# Patient Record
Sex: FEMALE | Race: OTHER | HISPANIC OR LATINO
[De-identification: names, ages, dates, MRNs, and addresses within clinical notes are randomized per-mention and may not be internally consistent; named-entity substitution may affect disease eponyms.]

---

## 2018-04-02 ENCOUNTER — APPOINTMENT (OUTPATIENT)
Dept: INTERNAL MEDICINE | Facility: CLINIC | Age: 38
End: 2018-04-02

## 2019-02-01 ENCOUNTER — OUTPATIENT (OUTPATIENT)
Dept: OUTPATIENT SERVICES | Facility: HOSPITAL | Age: 39
LOS: 1 days | End: 2019-02-01
Payer: MEDICAID

## 2019-02-01 PROCEDURE — G9001: CPT

## 2019-02-11 ENCOUNTER — INPATIENT (INPATIENT)
Facility: HOSPITAL | Age: 39
LOS: 9 days | Discharge: HOME HEALTH | End: 2019-02-21
Attending: SPECIALIST | Admitting: SPECIALIST

## 2019-02-11 VITALS
DIASTOLIC BLOOD PRESSURE: 58 MMHG | OXYGEN SATURATION: 96 % | TEMPERATURE: 100 F | RESPIRATION RATE: 20 BRPM | SYSTOLIC BLOOD PRESSURE: 120 MMHG | HEART RATE: 115 BPM

## 2019-02-11 LAB — HIV 1 & 2 AB SERPL IA.RAPID: REACTIVE

## 2019-02-11 RX ORDER — IPRATROPIUM/ALBUTEROL SULFATE 18-103MCG
3 AEROSOL WITH ADAPTER (GRAM) INHALATION ONCE
Refills: 0 | Status: COMPLETED | OUTPATIENT
Start: 2019-02-11 | End: 2019-02-11

## 2019-02-11 RX ORDER — CEFTRIAXONE 500 MG/1
1 INJECTION, POWDER, FOR SOLUTION INTRAMUSCULAR; INTRAVENOUS EVERY 24 HOURS
Qty: 0 | Refills: 0 | Status: DISCONTINUED | OUTPATIENT
Start: 2019-02-11 | End: 2019-02-12

## 2019-02-11 RX ORDER — IBUPROFEN 200 MG
600 TABLET ORAL ONCE
Refills: 0 | Status: COMPLETED | OUTPATIENT
Start: 2019-02-11 | End: 2019-02-11

## 2019-02-11 RX ORDER — FLUCONAZOLE 150 MG/1
200 TABLET ORAL ONCE
Qty: 0 | Refills: 0 | Status: COMPLETED | OUTPATIENT
Start: 2019-02-11 | End: 2019-02-11

## 2019-02-11 RX ORDER — SODIUM CHLORIDE 9 MG/ML
1000 INJECTION INTRAMUSCULAR; INTRAVENOUS; SUBCUTANEOUS
Qty: 0 | Refills: 0 | Status: DISCONTINUED | OUTPATIENT
Start: 2019-02-11 | End: 2019-02-13

## 2019-02-11 RX ORDER — SODIUM CHLORIDE 9 MG/ML
3 INJECTION INTRAMUSCULAR; INTRAVENOUS; SUBCUTANEOUS EVERY 8 HOURS
Refills: 0 | Status: DISCONTINUED | OUTPATIENT
Start: 2019-02-11 | End: 2019-02-20

## 2019-02-11 RX ORDER — CEFTRIAXONE 500 MG/1
1 INJECTION, POWDER, FOR SOLUTION INTRAMUSCULAR; INTRAVENOUS ONCE
Refills: 0 | Status: COMPLETED | OUTPATIENT
Start: 2019-02-11 | End: 2019-02-11

## 2019-02-11 RX ORDER — SODIUM CHLORIDE 9 MG/ML
1000 INJECTION INTRAMUSCULAR; INTRAVENOUS; SUBCUTANEOUS ONCE
Refills: 0 | Status: COMPLETED | OUTPATIENT
Start: 2019-02-11 | End: 2019-02-11

## 2019-02-11 RX ORDER — AZITHROMYCIN 500 MG/1
500 TABLET, FILM COATED ORAL EVERY 24 HOURS
Qty: 0 | Refills: 0 | Status: DISCONTINUED | OUTPATIENT
Start: 2019-02-11 | End: 2019-02-12

## 2019-02-11 RX ORDER — AZITHROMYCIN 500 MG/1
500 TABLET, FILM COATED ORAL ONCE
Refills: 0 | Status: COMPLETED | OUTPATIENT
Start: 2019-02-11 | End: 2019-02-11

## 2019-02-11 RX ORDER — FLUCONAZOLE 150 MG/1
100 TABLET ORAL DAILY
Qty: 0 | Refills: 0 | Status: DISCONTINUED | OUTPATIENT
Start: 2019-02-11 | End: 2019-02-13

## 2019-02-11 RX ORDER — ACETAMINOPHEN 500 MG
650 TABLET ORAL EVERY 6 HOURS
Qty: 0 | Refills: 0 | Status: DISCONTINUED | OUTPATIENT
Start: 2019-02-11 | End: 2019-02-21

## 2019-02-11 RX ORDER — NYSTATIN 500MM UNIT
500000 POWDER (EA) MISCELLANEOUS
Qty: 0 | Refills: 0 | Status: DISCONTINUED | OUTPATIENT
Start: 2019-02-11 | End: 2019-02-13

## 2019-02-11 RX ADMIN — AZITHROMYCIN 255 MILLIGRAM(S): 500 TABLET, FILM COATED ORAL at 18:42

## 2019-02-11 RX ADMIN — CEFTRIAXONE 100 GRAM(S): 500 INJECTION, POWDER, FOR SOLUTION INTRAMUSCULAR; INTRAVENOUS at 18:42

## 2019-02-11 RX ADMIN — Medication 600 MILLIGRAM(S): at 16:55

## 2019-02-11 RX ADMIN — SODIUM CHLORIDE 1000 MILLILITER(S): 9 INJECTION INTRAMUSCULAR; INTRAVENOUS; SUBCUTANEOUS at 16:55

## 2019-02-11 RX ADMIN — Medication 1 TABLET(S): at 23:11

## 2019-02-11 RX ADMIN — SODIUM CHLORIDE 50 MILLILITER(S): 9 INJECTION INTRAMUSCULAR; INTRAVENOUS; SUBCUTANEOUS at 23:11

## 2019-02-11 RX ADMIN — Medication 500000 UNIT(S): at 23:12

## 2019-02-11 RX ADMIN — FLUCONAZOLE 200 MILLIGRAM(S): 150 TABLET ORAL at 19:04

## 2019-02-11 RX ADMIN — SODIUM CHLORIDE 1000 MILLILITER(S): 9 INJECTION INTRAMUSCULAR; INTRAVENOUS; SUBCUTANEOUS at 18:42

## 2019-02-11 RX ADMIN — SODIUM CHLORIDE 3 MILLILITER(S): 9 INJECTION INTRAMUSCULAR; INTRAVENOUS; SUBCUTANEOUS at 18:43

## 2019-02-11 RX ADMIN — Medication 3 MILLILITER(S): at 16:55

## 2019-02-11 NOTE — ED PROVIDER NOTE - OBJECTIVE STATEMENT
37 y/o female Wolof speaking presents c/o "I have 39 y/o female Hong Konger speaking presents c/o "I have a bad cough with white and green phlegm for 2 weeks. Sometimes when I cough I throw up. My throat hurts and it feels like it's burning. I had a fever today. I took Tylenol 2 tabs at 1 o'clock. I feel weak. I was seen at [facility] and they prescribed me Tessalon Perles, Claritan and  Nasonex with no improvement of my symptoms." no CP/ HA 39 y/o female Haitian speaking presents c/o "I have a bad cough with white and green phlegm for 2 weeks. Sometimes when I cough I throw up. My throat hurts and it feels like it's burning. I had a fever today. I took Tylenol 2 tabs at 1 o'clock. I feel weak and SOB. I was seen at [facility] and they prescribed me Tessalon Perles, Claritan and  Nasonex with no improvement of my symptoms." no CP/ HA

## 2019-02-11 NOTE — H&P ADULT - NSHPLABSRESULTS_GEN_ALL_CORE
13.7   7.88  )-----------( 350      ( 11 Feb 2019 16:51 )             40.2     137  |  99  |  10  ----------------------------<  109<H>  3.7   |  20  |  0.7    Ca    8.9      11 Feb 2019 16:51    Lactate Trend  02-11 @ 16:51 Lactate:1.0     < from: Xray Chest 2 Views PA/Lat (02.11.19 @ 17:27) >    Scattered bilateral airspace opacities, right greater than left,   suspicious for pneumonia in the appropriate clinical setting.     < end of copied text >

## 2019-02-11 NOTE — H&P ADULT - HISTORY OF PRESENT ILLNESS
38 yof with no significant pmh p/w cc of sore throat, 40 lb weight loss, decrease appetite since 1 month assoc with fever and productive cough since 1 week ptp;  - pt has been having dysphagia, sore throat since 1 month which is progressively worsening  - pt reports loosing 40 lb weight in 1 month period associated with decrease appetite;  - Fever assoc with productive cough with greenish white sputum started 1 week ago and has been progressively worsening; pt feels very weak since 1 week  - Pt currently has unprotected sex with one partner but reports to have many sexual partners in the past; Pt has been in [place] since [place]

## 2019-02-11 NOTE — H&P ADULT - NSHPPHYSICALEXAM_GEN_ALL_CORE
GENERAL: NAD, speaks in full sentences, no signs of respiratory distress  HEAD:  Atraumatic, Normocephalic  EYES: EOMI, PERRLA, conjunctiva and sclera clear  NECK: Supple, No JVD  CHEST/LUNG: Clear to auscultation bilaterally; No wheeze; No crackles; No accessory muscles used  HEART: Regular rate and rhythm; No murmurs;   ABDOMEN: Soft, Nontender, Nondistended; Bowel sounds present; No guarding  EXTREMITIES:  2+ Peripheral Pulses, No cyanosis or edema  PSYCH: AAOx3  NEUROLOGY: non-focal  SKIN: No rashes or lesions    T(C): 37.8 (11 Feb 2019 18:22), Max: 37.8 (11 Feb 2019 18:22)  T(F): 100 (11 Feb 2019 18:22), Max: 100 (11 Feb 2019 18:22)  HR: 99 (11 Feb 2019 18:22) (99 - 115)  BP: 102/69 (11 Feb 2019 18:22) (102/69 - 120/58)  RR: 20 (11 Feb 2019 18:22) (20 - 20)  SpO2: 100% (11 Feb 2019 18:22) (96% - 100%) GENERAL: ill appearing  HEAD:  Atraumatic, Normocephalic  ENT- oral thrush  NECK: Supple, No JVD  CHEST/LUNG: Clear to auscultation bilaterally;   HEART: Regular rate and rhythm; No murmurs;   ABDOMEN: Soft, Nontender, Nondistended; Bowel sounds present; No guarding  EXTREMITIES: No cyanosis or edema  PSYCH: AAOx3  NEUROLOGY: non-focal  SKIN: No rashes or lesions    T(C): 37.8 (11 Feb 2019 18:22), Max: 37.8 (11 Feb 2019 18:22)  T(F): 100 (11 Feb 2019 18:22), Max: 100 (11 Feb 2019 18:22)  HR: 99 (11 Feb 2019 18:22) (99 - 115)  BP: 102/69 (11 Feb 2019 18:22) (102/69 - 120/58)  RR: 20 (11 Feb 2019 18:22) (20 - 20)  SpO2: 100% (11 Feb 2019 18:22) (96% - 100%)

## 2019-02-11 NOTE — ED PROVIDER NOTE - ATTENDING CONTRIBUTION TO CARE
37 yo F ( phone utilized), now with cough x 3-4 wks and throat burning.  seen by urg.   vss, nontoxic, il appearing, obviously in pain when swallowing, pink conj, anicteric, MMM, + thrush noted to oropharynx, neck supple, dec BS bilat, RRR, equal radial pulses bilat, abd soft/nt/nd, no cva tend. no calves tend, no edema, no fnd. no rashes.  cxr, labs, reassess

## 2019-02-11 NOTE — H&P ADULT - ASSESSMENT
# Community acquired Pneumonia  - rocephin + azithro  - f/u cultures    # Oral thrush  - s/p fluconazole  - f/u HIV panel    # Dvt ppx  - sc lovenox    # Dispo  - home # Community acquired Pneumonia  - rocephin + azithro  - f/u cultures  - sputum cultures    # Dysphagia 2/2 severe Oral thrush/ weight loss r/o Cellular immunodeficiency 2/2 HIV vs malignancy  - s/p fluconazole one dose; start nystatin for now  - consider ID cs  - pap smear reported to be normal by pt;   - f/u HIV panel    # Dvt ppx  - sc lovenox    # Dispo  - home 38 yof with no significant pmh p/w cc of sore throat, 40 lb weight loss, decrease appetite since 1 month assoc with fever and productive cough since 1 week ptp;    # Community acquired Pneumonia  - rocephin + azithro  - f/u cultures  - sputum cultures    # Dysphagia 2/2 severe Oral thrush/ weight loss/ Decrease appetite/ r/o Cellular immunodeficiency 2/2 HIV vs malignancy  - s/p fluconazole one dose; start nystatin for now  - consider ID cs  - pap smear reported to be normal by pt;   - f/u HIV panel    # Dvt ppx  - sc lovenox    # Dispo  - home 38 yof with no significant pmh p/w cc of sore throat, 40 lb weight loss, decrease appetite since 1 month assoc with fever and productive cough since 1 week ptp;    # HIV infection  - ID consult  - check cd4, hiv pcr    # Pneumonia in the setting of HIV; r/o PCP pneumonia  - check LDH  - empiric rocephin + azithro; empiric bactrim  - f/u cultures  - sputum cultures    # Dysphagia 2/2 severe Oral thrush/ weight loss/ Decrease appetite 2/2 HIV  - s/p fluconazole loading 200mg one dose; start fluconazole 100mg;   - start nystatin swish  -  ID cs    # Dvt ppx  - sc lovenox    # Dispo  - home 38 yof with no significant pmh p/w cc of sore throat, 40 lb weight loss, decrease appetite since 1 month assoc with fever and productive cough since 1 week ptp;    # HIV infection  - ID consult  - check cd4, hiv pcr  - pt advised to inform partners for HIV testing    # Pneumonia in the setting of HIV; r/o PCP pneumonia  - check LDH  - empiric rocephin + azithro; empiric bactrim  - f/u cultures  - sputum cultures    # Dysphagia 2/2 severe Oral thrush/ weight loss/ Decrease appetite 2/2 HIV  - s/p fluconazole loading 200mg one dose; start fluconazole 100mg;   - start nystatin swish  -  ID cs    # Dvt ppx  - sc lovenox    # Dispo  - home 38 yof with no significant pmh p/w cc of sore throat, 40 lb weight loss, decrease appetite since 1 month assoc with fever and productive cough since 1 week ptp;    # HIV infection  - ID consult  - check cd4, hiv pcr  - pt advised to inform partners for HIV testing;    # Pneumonia in the setting of HIV; r/o PCP pneumonia  - check LDH  - empiric rocephin + azithro; empiric bactrim 300mg iv q6h;   - f/u cultures  - sputum cultures    # Dysphagia 2/2 severe Oral thrush/ weight loss/ Decrease appetite 2/2 HIV  - s/p fluconazole loading 200mg one dose; start fluconazole 100mg;   - start nystatin swish  -  ID cs    # Dvt ppx  - sc lovenox    # Dispo  - home

## 2019-02-11 NOTE — ED PROVIDER NOTE - PROGRESS NOTE DETAILS
9338 CXR was reviewed with radiologist. bilat sig oapcities, + thrush. will add HIV testing. consider admission HIV positive. spoke to dr hero RUEDA and spoke to dr Rios admitting resident, who will change admission to ID service and add LDH and start Bactrim for possible PCP.

## 2019-02-12 LAB
4/8 RATIO: 0.1 RATIO — LOW (ref 1.2–3.4)
ABS CD8: 952 /UL — HIGH (ref 206–494)
ALBUMIN SERPL ELPH-MCNC: 3.2 G/DL — LOW (ref 3.5–5.2)
ALP SERPL-CCNC: 137 U/L — HIGH (ref 30–115)
ALT FLD-CCNC: 22 U/L — SIGNIFICANT CHANGE UP (ref 0–41)
ANION GAP SERPL CALC-SCNC: 15 MMOL/L — HIGH (ref 7–14)
AST SERPL-CCNC: 56 U/L — HIGH (ref 0–41)
BILIRUB DIRECT SERPL-MCNC: 0.2 MG/DL — SIGNIFICANT CHANGE UP (ref 0–0.2)
BILIRUB INDIRECT FLD-MCNC: 0.1 MG/DL — LOW (ref 0.2–1.2)
BILIRUB SERPL-MCNC: 0.3 MG/DL — SIGNIFICANT CHANGE UP (ref 0.2–1.2)
BUN SERPL-MCNC: 5 MG/DL — LOW (ref 10–20)
CALCIUM SERPL-MCNC: 8.3 MG/DL — LOW (ref 8.5–10.1)
CD16+CD56+ CELLS NFR BLD: 12 % — SIGNIFICANT CHANGE UP (ref 4–20)
CD16+CD56+ CELLS NFR SPEC: 158 /UL — SIGNIFICANT CHANGE UP (ref 89–385)
CD19 BLASTS SPEC-ACNC: 5 % — LOW (ref 10–28)
CD19 BLASTS SPEC-ACNC: 63 /UL — LOW (ref 72–502)
CD3 BLASTS SPEC-ACNC: 1031 /UL — SIGNIFICANT CHANGE UP (ref 800–2012)
CD3 BLASTS SPEC-ACNC: 82 % — HIGH (ref 59–81)
CD4 %: 5 % — LOW (ref 42–58)
CD8 %: 75 % — HIGH (ref 14–30)
CHLORIDE SERPL-SCNC: 104 MMOL/L — SIGNIFICANT CHANGE UP (ref 98–110)
CO2 SERPL-SCNC: 20 MMOL/L — SIGNIFICANT CHANGE UP (ref 17–32)
CREAT SERPL-MCNC: 0.6 MG/DL — LOW (ref 0.7–1.5)
ESTIMATED AVERAGE GLUCOSE: 111 MG/DL — SIGNIFICANT CHANGE UP (ref 68–114)
GLUCOSE SERPL-MCNC: 125 MG/DL — HIGH (ref 70–99)
HBA1C BLD-MCNC: 5.5 % — SIGNIFICANT CHANGE UP (ref 4–5.6)
HCT VFR BLD CALC: 37 % — SIGNIFICANT CHANGE UP (ref 37–47)
HGB BLD-MCNC: 12.6 G/DL — SIGNIFICANT CHANGE UP (ref 12–16)
LDH SERPL L TO P-CCNC: 491 — HIGH (ref 50–242)
MAGNESIUM SERPL-MCNC: 1.8 MG/DL — SIGNIFICANT CHANGE UP (ref 1.8–2.4)
MCHC RBC-ENTMCNC: 30.9 PG — SIGNIFICANT CHANGE UP (ref 27–31)
MCHC RBC-ENTMCNC: 34.1 G/DL — SIGNIFICANT CHANGE UP (ref 32–37)
MCV RBC AUTO: 90.7 FL — SIGNIFICANT CHANGE UP (ref 81–99)
NRBC # BLD: 0 /100 WBCS — SIGNIFICANT CHANGE UP (ref 0–0)
PLATELET # BLD AUTO: 306 K/UL — SIGNIFICANT CHANGE UP (ref 130–400)
POTASSIUM SERPL-MCNC: 3.3 MMOL/L — LOW (ref 3.5–5)
POTASSIUM SERPL-SCNC: 3.3 MMOL/L — LOW (ref 3.5–5)
PROT SERPL-MCNC: 8 G/DL — SIGNIFICANT CHANGE UP (ref 6–8)
RBC # BLD: 4.08 M/UL — LOW (ref 4.2–5.4)
RBC # FLD: 13.2 % — SIGNIFICANT CHANGE UP (ref 11.5–14.5)
SODIUM SERPL-SCNC: 139 MMOL/L — SIGNIFICANT CHANGE UP (ref 135–146)
T-CELL CD4 SUBSET PNL BLD: 70 /UL — LOW (ref 495–1312)
WBC # BLD: 7.55 K/UL — SIGNIFICANT CHANGE UP (ref 4.8–10.8)
WBC # FLD AUTO: 7.55 K/UL — SIGNIFICANT CHANGE UP (ref 4.8–10.8)

## 2019-02-12 RX ORDER — FLUCONAZOLE 150 MG/1
200 TABLET ORAL EVERY 24 HOURS
Qty: 0 | Refills: 0 | Status: DISCONTINUED | OUTPATIENT
Start: 2019-02-13 | End: 2019-02-13

## 2019-02-12 RX ORDER — FLUCONAZOLE 150 MG/1
200 TABLET ORAL ONCE
Qty: 0 | Refills: 0 | Status: COMPLETED | OUTPATIENT
Start: 2019-02-12 | End: 2019-02-12

## 2019-02-12 RX ORDER — FLUCONAZOLE 150 MG/1
TABLET ORAL
Qty: 0 | Refills: 0 | Status: DISCONTINUED | OUTPATIENT
Start: 2019-02-12 | End: 2019-02-13

## 2019-02-12 RX ADMIN — Medication 518.75 MILLIGRAM(S): at 00:45

## 2019-02-12 RX ADMIN — FLUCONAZOLE 100 MILLIGRAM(S): 150 TABLET ORAL at 11:16

## 2019-02-12 RX ADMIN — Medication 518.75 MILLIGRAM(S): at 13:43

## 2019-02-12 RX ADMIN — Medication 500000 UNIT(S): at 06:00

## 2019-02-12 RX ADMIN — FLUCONAZOLE 100 MILLIGRAM(S): 150 TABLET ORAL at 11:15

## 2019-02-12 RX ADMIN — SODIUM CHLORIDE 3 MILLILITER(S): 9 INJECTION INTRAMUSCULAR; INTRAVENOUS; SUBCUTANEOUS at 06:03

## 2019-02-12 RX ADMIN — Medication 650 MILLIGRAM(S): at 17:08

## 2019-02-12 RX ADMIN — SODIUM CHLORIDE 3 MILLILITER(S): 9 INJECTION INTRAMUSCULAR; INTRAVENOUS; SUBCUTANEOUS at 22:24

## 2019-02-12 RX ADMIN — SODIUM CHLORIDE 50 MILLILITER(S): 9 INJECTION INTRAMUSCULAR; INTRAVENOUS; SUBCUTANEOUS at 20:14

## 2019-02-12 RX ADMIN — Medication 40 MILLIGRAM(S): at 18:30

## 2019-02-12 RX ADMIN — Medication 500000 UNIT(S): at 18:29

## 2019-02-12 RX ADMIN — Medication 500000 UNIT(S): at 11:16

## 2019-02-12 RX ADMIN — SODIUM CHLORIDE 3 MILLILITER(S): 9 INJECTION INTRAMUSCULAR; INTRAVENOUS; SUBCUTANEOUS at 12:11

## 2019-02-12 RX ADMIN — Medication 1 TABLET(S): at 11:16

## 2019-02-12 RX ADMIN — Medication 518.75 MILLIGRAM(S): at 06:00

## 2019-02-12 RX ADMIN — Medication 518.75 MILLIGRAM(S): at 18:29

## 2019-02-12 RX ADMIN — Medication 600 MILLIGRAM(S): at 11:28

## 2019-02-12 NOTE — PROGRESS NOTE ADULT - ASSESSMENT
38y old Female who presents with a chief complaint of sore throat, weight loss, decreased PO intake. Found to have AIDS.     # AIDS  -ID following (Dr. Cota)- - D/C azithro/ceftriaxone, Continue IV bactrim 300mg q6h, Add prednisone 40mg BID x5 days followed by 21 day taper, Change to IV fluc 200mg q24h, Send HIV VL, HIV CMIA, CD4, Send fungitell to confirm PCP testing, Send quantiferon gold (low suspicion for active TB), CMV IgG, Toxo IgG, Hep B sAb, sAg, cAb, HCV, RPR, LFTs, Will start ART pending confirmatory testing, If CD4 <50 will start azithro 1200mg weekly for MAC ppx    # PCP 2/2 HIV  - LDH elevated  -on bactrim    # Dysphagia 2/2 severe Oral thrush/ weight loss/ Decrease appetite 2/2 HIV  -changed PO fluconazole to IV 2/2 esophagitis    # Dvt ppx  - sc lovenox    # Dispo  - home

## 2019-02-12 NOTE — ED ADULT NURSE REASSESSMENT NOTE - NS ED NURSE REASSESS COMMENT FT1
Patient noted with o2 sat of 88% on RA and a temp of 100.3. supplemental o2 provided, tylenol administered as per MD order. Patient's o2 sat increased to 97% on 3L o2. admitting resident notified and ordered duoneb treatment. RN to reassess.

## 2019-02-12 NOTE — PROGRESS NOTE ADULT - ASSESSMENT
43F from [place]    No significant PMH    Admitted with SOB, odynophagia + HIV 3rd generation testing, exam with candida with presumed esophagitis and likely PCP PNA given CXR findings and elevated LDH  Confirmatory testing pending, but high suspicion for AIDS, likely 2/2 heterosexual intercourse    - D/C azithro/ceftriaxone  - Continue IV bactrim 300mg q6h  - Add prednisone 40mg BID x5 days followed by 21 day taper  - Change to IV fluc 200mg q24h  - Send HIV VL, HIV CMIA, CD4  - Send fungitell to confirm PCP testing  - Send quantiferon gold (low suspicion for active TB), CMV IgG, Toxo IgG, Hep B sAb, sAg, cAb, HCV, RPR, LFTs  - Will start ART pending confirmatory testing  - If CD4 <50 will start azithro 1200mg weekly for MAC ppx    Spectra [phone]

## 2019-02-12 NOTE — PROGRESS NOTE ADULT - SUBJECTIVE AND OBJECTIVE BOX
SUBJECTIVE:    Patient is a 38y old Female who presents with a chief complaint of pna; thrush (12 Feb 2019 09:57)    Currently admitted to medicine with the primary diagnosis of Pneumonia of both lower lobes     Today is hospital day 1d. This morning she is resting comfortably in bed and reports no new issues or overnight events.     PAST MEDICAL & SURGICAL HISTORY  No pertinent past medical history    SOCIAL HISTORY:  Negative for smoking/alcohol/drug use.     ALLERGIES:  No Known Allergies    MEDICATIONS:  STANDING MEDICATIONS  fluconAZOLE   Tablet 100 milliGRAM(s) Oral daily  fluconAZOLE IVPB      multivitamin 1 Tablet(s) Oral daily  nystatin    Suspension 304516 Unit(s) Oral four times a day  predniSONE   Tablet 40 milliGRAM(s) Oral daily  sodium chloride 0.9% lock flush 3 milliLiter(s) IV Push every 8 hours  sodium chloride 0.9%. 1000 milliLiter(s) IV Continuous <Continuous>  trimethoprim / sulfamethoxazole IVPB 300 milliGRAM(s) IV Intermittent four times a day    PRN MEDICATIONS  acetaminophen   Tablet .. 650 milliGRAM(s) Oral every 6 hours PRN    VITALS:   T(F): 99.8  HR: 91  BP: 117/59  RR: 17  SpO2: 98%    LABS:                        12.6   7.55  )-----------( 306      ( 12 Feb 2019 10:17 )             37.0     02-12    139  |  104  |  5<L>  ----------------------------<  125<H>  3.3<L>   |  20  |  0.6<L>    Ca    8.3<L>      12 Feb 2019 10:17  Mg     1.8     02-12            Lactate, Blood: 1.0 mmol/L (02-11-19 @ 16:51)          RADIOLOGY:    PHYSICAL EXAM:  GEN: No acute distress  LUNGS: Clear to auscultation bilaterally   HEART: S1/S2 present. RRR.   ABD: Soft, non-tender, non-distended. Bowel sounds present  EXT: NC/NC/NE/2+PP/MARTINEZ  NEURO: AAOX3

## 2019-02-12 NOTE — ED ADULT NURSE REASSESSMENT NOTE - NS ED NURSE REASSESS COMMENT FT1
Patient moved to Isolation room ,report given Juana MORGAN . Patient is not in any acute distress at this time .

## 2019-02-12 NOTE — PROGRESS NOTE ADULT - SUBJECTIVE AND OBJECTIVE BOX
MICKBELEN  38y, Female      OVERNIGHT EVENTS:  continued SOB, cough    ROS negative except as per above    VITALS:  T(F): 99.8, Max: 100 (02-11-19 @ 18:22)  HR: 91  BP: 117/59  RR: 17Vital Signs Last 24 Hrs  T(C): 37.7 (12 Feb 2019 07:39), Max: 37.8 (11 Feb 2019 18:22)  T(F): 99.8 (12 Feb 2019 07:39), Max: 100 (11 Feb 2019 18:22)  HR: 91 (12 Feb 2019 07:39) (91 - 115)  BP: 117/59 (12 Feb 2019 07:39) (102/69 - 120/58)  BP(mean): --  RR: 17 (12 Feb 2019 07:39) (17 - 20)  SpO2: 98% (12 Feb 2019 07:39) (96% - 100%)    PHYSICAL EXAM  Gen: Awake and alert,  HEENT: NCAT. EOMI. MMM. +thrush  Neck: Supple, no cervical LAD  CV: RRR, no murmurs  Lungs: crackles   Abd: Soft. NTND  Extr: wwp, no edema  Skin: no rash  Neuro: No focal deficits  Lines: clean      TESTS & MEASUREMENTS:                        13.7   7.88  )-----------( 350      ( 11 Feb 2019 16:51 )             40.2     02-11    137  |  99  |  10  ----------------------------<  109<H>  3.7   |  20  |  0.7    Ca    8.9      11 Feb 2019 16:51              RADIOLOGY & ADDITIONAL TESTS:    ANTIBIOTICS:  azithromycin  IVPB   255 mL/Hr IV Intermittent (02-11-19 @ 18:42)    cefTRIAXone   IVPB   100 mL/Hr IV Intermittent (02-11-19 @ 18:42)    fluconAZOLE   Tablet   200 milliGRAM(s) Oral (02-11-19 @ 19:04)    nystatin    Suspension   [id number] Unit(s) Oral (02-12-19 @ 06:00)   [id number] Unit(s) Oral (02-11-19 @ 23:12)    trimethoprim / sulfamethoxazole IVPB   518.75 mL/Hr IV Intermittent (02-12-19 @ 06:00)   518.75 mL/Hr IV Intermittent (02-12-19 @ 00:45)        azithromycin  IVPB 500 milliGRAM(s) IV Intermittent every 24 hours  cefTRIAXone   IVPB 1 Gram(s) IV Intermittent every 24 hours  fluconAZOLE   Tablet 100 milliGRAM(s) Oral daily  fluconAZOLE IVPB      fluconAZOLE IVPB 200 milliGRAM(s) IV Intermittent once  nystatin    Suspension [id number] Unit(s) Oral four times a day  trimethoprim / sulfamethoxazole IVPB 300 milliGRAM(s) IV Intermittent four times a day

## 2019-02-13 DIAGNOSIS — Z71.89 OTHER SPECIFIED COUNSELING: ICD-10-CM

## 2019-02-13 LAB
4/8 RATIO: 0.1 RATIO — LOW (ref 1.2–3.4)
ABS CD8: 772 /UL — HIGH (ref 206–494)
CD16+CD56+ CELLS NFR BLD: 14 % — SIGNIFICANT CHANGE UP (ref 4–20)
CD16+CD56+ CELLS NFR SPEC: 153 /UL — SIGNIFICANT CHANGE UP (ref 89–385)
CD19 BLASTS SPEC-ACNC: 4 % — LOW (ref 10–28)
CD19 BLASTS SPEC-ACNC: 43 /UL — LOW (ref 72–502)
CD3 BLASTS SPEC-ACNC: 81 % — SIGNIFICANT CHANGE UP (ref 59–81)
CD3 BLASTS SPEC-ACNC: 846 /UL — SIGNIFICANT CHANGE UP (ref 800–2012)
CD4 %: 6 % — LOW (ref 42–58)
CD8 %: 73 % — HIGH (ref 14–30)
HAV IGM SER-ACNC: SIGNIFICANT CHANGE UP
HBV CORE IGM SER-ACNC: SIGNIFICANT CHANGE UP
HBV SURFACE AG SER-ACNC: SIGNIFICANT CHANGE UP
HCV AB S/CO SERPL IA: 0.13 S/CO — SIGNIFICANT CHANGE UP
HCV AB SERPL-IMP: SIGNIFICANT CHANGE UP
HIV 1+2 AB+HIV1 P24 AG SERPL QL IA: REACTIVE
HIV1+2 AB SPEC QL: ABNORMAL
HIV1+2 AB SPEC QL: SIGNIFICANT CHANGE UP
T GONDII IGG SER QL: 5.8 IU/ML — SIGNIFICANT CHANGE UP
T GONDII IGG SER QL: NEGATIVE — SIGNIFICANT CHANGE UP
T GONDII IGM SER QL: <3 AU/ML — SIGNIFICANT CHANGE UP
T GONDII IGM SER QL: NEGATIVE — SIGNIFICANT CHANGE UP
T-CELL CD4 SUBSET PNL BLD: 61 /UL — LOW (ref 495–1312)

## 2019-02-13 RX ORDER — POTASSIUM CHLORIDE 20 MEQ
40 PACKET (EA) ORAL ONCE
Qty: 0 | Refills: 0 | Status: COMPLETED | OUTPATIENT
Start: 2019-02-13 | End: 2019-02-13

## 2019-02-13 RX ORDER — EMTRICITABINE AND TENOFOVIR DISOPROXIL FUMARATE 200; 300 MG/1; MG/1
1 TABLET, FILM COATED ORAL DAILY
Qty: 0 | Refills: 0 | Status: DISCONTINUED | OUTPATIENT
Start: 2019-02-13 | End: 2019-02-21

## 2019-02-13 RX ORDER — DOLUTEGRAVIR SODIUM 25 MG/1
50 TABLET, FILM COATED ORAL DAILY
Qty: 0 | Refills: 0 | Status: DISCONTINUED | OUTPATIENT
Start: 2019-02-13 | End: 2019-02-21

## 2019-02-13 RX ORDER — FLUCONAZOLE 150 MG/1
200 TABLET ORAL EVERY 24 HOURS
Qty: 0 | Refills: 0 | Status: DISCONTINUED | OUTPATIENT
Start: 2019-02-14 | End: 2019-02-14

## 2019-02-13 RX ORDER — POTASSIUM CHLORIDE 20 MEQ
20 PACKET (EA) ORAL
Qty: 0 | Refills: 0 | Status: DISCONTINUED | OUTPATIENT
Start: 2019-02-13 | End: 2019-02-13

## 2019-02-13 RX ADMIN — Medication 518.75 MILLIGRAM(S): at 00:32

## 2019-02-13 RX ADMIN — FLUCONAZOLE 100 MILLIGRAM(S): 150 TABLET ORAL at 08:58

## 2019-02-13 RX ADMIN — Medication 40 MILLIGRAM(S): at 05:51

## 2019-02-13 RX ADMIN — SODIUM CHLORIDE 3 MILLILITER(S): 9 INJECTION INTRAMUSCULAR; INTRAVENOUS; SUBCUTANEOUS at 05:51

## 2019-02-13 RX ADMIN — Medication 20 MILLIEQUIVALENT(S): at 14:06

## 2019-02-13 RX ADMIN — Medication 518.75 MILLIGRAM(S): at 11:22

## 2019-02-13 RX ADMIN — Medication 518.75 MILLIGRAM(S): at 05:51

## 2019-02-13 RX ADMIN — Medication 500000 UNIT(S): at 11:22

## 2019-02-13 RX ADMIN — SODIUM CHLORIDE 3 MILLILITER(S): 9 INJECTION INTRAMUSCULAR; INTRAVENOUS; SUBCUTANEOUS at 22:34

## 2019-02-13 RX ADMIN — Medication 40 MILLIGRAM(S): at 17:15

## 2019-02-13 RX ADMIN — DOLUTEGRAVIR SODIUM 50 MILLIGRAM(S): 25 TABLET, FILM COATED ORAL at 17:00

## 2019-02-13 RX ADMIN — Medication 500000 UNIT(S): at 05:51

## 2019-02-13 RX ADMIN — Medication 500000 UNIT(S): at 00:32

## 2019-02-13 RX ADMIN — EMTRICITABINE AND TENOFOVIR DISOPROXIL FUMARATE 1 TABLET(S): 200; 300 TABLET, FILM COATED ORAL at 17:00

## 2019-02-13 RX ADMIN — SODIUM CHLORIDE 3 MILLILITER(S): 9 INJECTION INTRAMUSCULAR; INTRAVENOUS; SUBCUTANEOUS at 12:48

## 2019-02-13 RX ADMIN — Medication 1 TABLET(S): at 11:22

## 2019-02-13 RX ADMIN — Medication 518.75 MILLIGRAM(S): at 17:15

## 2019-02-13 RX ADMIN — Medication 40 MILLIEQUIVALENT(S): at 16:11

## 2019-02-13 RX ADMIN — Medication 518.75 MILLIGRAM(S): at 23:42

## 2019-02-13 NOTE — PROGRESS NOTE ADULT - ASSESSMENT
43F from [place]    Admitted with SOB, odynophagia, exam with candida with presumed esophagitis and likely PCP PNA given CXR findings and elevated LDH  New diagnosis of Symptomatic AIDS CD4 70; 5%    - START ART: ****   - Continue IV bactrim 300mg q6h  - Prednisone 40mg BID x5 days followed by 21 day taper  - IV fluc 200mg q24h  - HIV VL, fungitell to confirm PCP testing, quantiferon gold (low suspicion for active TB), CMV IgG, RPR    Spectra [id number] 43F from [place]    Admitted with SOB, odynophagia, exam with candida with presumed esophagitis and likely PCP PNA given CXR findings and elevated LDH  New diagnosis of Symptomatic AIDS CD4 70; 5%    - CT Chest without  - AFB x3 initiated  - START ART: Descovy + Dolutegravir 50mg daily   - Continue IV bactrim 300mg q6h  - Prednisone 40mg BID x5 days, 40mg daily x5 days, 20mg daily x11 days  - IV fluc 200mg q24h (monitor LFTs)  - HIV VL, fungitell to confirm PCP testing, quantiferon gold (low suspicion for active TB), CMV IgG, RPR    Spectra [phone]

## 2019-02-13 NOTE — PROGRESS NOTE ADULT - SUBJECTIVE AND OBJECTIVE BOX
BELEN BARTON  38y, Female      OVERNIGHT EVENTS:  tm 100.3  CD4 70; 5%    ROS negative except as per above    VITALS:  T(F): 98.1, Max: 100.3 (02-12-19 @ 16:31)  HR: 70  BP: 98/58  RR: 20Vital Signs Last 24 Hrs  T(C): 36.7 (13 Feb 2019 07:35), Max: 37.9 (12 Feb 2019 16:31)  T(F): 98.1 (13 Feb 2019 07:35), Max: 100.3 (12 Feb 2019 16:31)  HR: 70 (13 Feb 2019 07:35) (70 - 105)  BP: 98/58 (13 Feb 2019 07:35) (98/58 - 115/84)  BP(mean): --  RR: 20 (13 Feb 2019 07:35) (18 - 95)  SpO2: 99% (13 Feb 2019 07:35) (88% - 100%)    PHYSICAL EXAM  Gen: Awake and alert,  HEENT: NCAT. EOMI. MMM. +thrush  Neck: Supple, no cervical LAD  CV: RRR, no murmurs  Lungs: crackles   Abd: Soft. NTND  Extr: wwp, no edema  Skin: no rash  Neuro: No focal deficits  Lines: clean      TESTS & MEASUREMENTS:                        12.6   7.55  )-----------( 306      ( 12 Feb 2019 10:17 )             37.0     02-12    139  |  104  |  5<L>  ----------------------------<  125<H>  3.3<L>   |  20  |  0.6<L>    Ca    8.3<L>      12 Feb 2019 10:17  Mg     1.8     02-12    TPro  8.0  /  Alb  3.2<L>  /  TBili  0.3  /  DBili  0.2  /  AST  56<H>  /  ALT  22  /  AlkPhos  137<H>  02-12    LIVER FUNCTIONS - ( 12 Feb 2019 11:35 )  Alb: 3.2 g/dL / Pro: 8.0 g/dL / ALK PHOS: 137 U/L / ALT: 22 U/L / AST: 56 U/L / GGT: x             Culture - Blood (collected 02-11-19 @ 17:39)  Source: .Blood Blood  Preliminary Report (02-12-19 @ 23:01):    No growth to date.    Culture - Blood (collected 02-11-19 @ 17:39)  Source: .Blood Blood  Preliminary Report (02-12-19 @ 23:01):    No growth to date.          RADIOLOGY & ADDITIONAL TESTS:    ANTIBIOTICS:  azithromycin  IVPB   255 mL/Hr IV Intermittent (02-11-19 @ 18:42)    cefTRIAXone   IVPB   100 mL/Hr IV Intermittent (02-11-19 @ 18:42)    fluconAZOLE   Tablet   200 milliGRAM(s) Oral (02-11-19 @ 19:04)    fluconAZOLE   Tablet   100 milliGRAM(s) Oral (02-12-19 @ 11:16)    fluconAZOLE IVPB   100 mL/Hr IV Intermittent (02-12-19 @ 11:15)    fluconAZOLE IVPB   100 mL/Hr IV Intermittent (02-13-19 @ 08:58)    nystatin    Suspension   [id number] Unit(s) Oral (02-13-19 @ 05:51)   [id number] Unit(s) Oral (02-13-19 @ 00:32)   [id number] Unit(s) Oral (02-12-19 @ 18:29)   [id number] Unit(s) Oral (02-12-19 @ 11:16)   [id number] Unit(s) Oral (02-12-19 @ 06:00)   [id number] Unit(s) Oral (02-11-19 @ 23:12)    trimethoprim / sulfamethoxazole IVPB   518.75 mL/Hr IV Intermittent (02-13-19 @ 05:51)   518.75 mL/Hr IV Intermittent (02-13-19 @ 00:32)   518.75 mL/Hr IV Intermittent (02-12-19 @ 18:29)   518.75 mL/Hr IV Intermittent (02-12-19 @ 13:43)   518.75 mL/Hr IV Intermittent (02-12-19 @ 06:00)   518.75 mL/Hr IV Intermittent (02-12-19 @ 00:45)        fluconAZOLE   Tablet 100 milliGRAM(s) Oral daily  fluconAZOLE IVPB      fluconAZOLE IVPB 200 milliGRAM(s) IV Intermittent every 24 hours  nystatin    Suspension [id number] Unit(s) Oral four times a day  trimethoprim / sulfamethoxazole IVPB 300 milliGRAM(s) IV Intermittent four times a day BELEN BARTON  38y, Female      OVERNIGHT EVENTS:  tm 100.3  CD4 70; 5%  denies odynophagia    ROS negative except as per above    VITALS:  T(F): 98.1, Max: 100.3 (02-12-19 @ 16:31)  HR: 70  BP: 98/58  RR: 20Vital Signs Last 24 Hrs  T(C): 36.7 (13 Feb 2019 07:35), Max: 37.9 (12 Feb 2019 16:31)  T(F): 98.1 (13 Feb 2019 07:35), Max: 100.3 (12 Feb 2019 16:31)  HR: 70 (13 Feb 2019 07:35) (70 - 105)  BP: 98/58 (13 Feb 2019 07:35) (98/58 - 115/84)  BP(mean): --  RR: 20 (13 Feb 2019 07:35) (18 - 95)  SpO2: 99% (13 Feb 2019 07:35) (88% - 100%)    PHYSICAL EXAM  Gen: Awake and alert,  HEENT: NCAT. EOMI. MMM. improved thrush  Neck: Supple, no cervical LAD  CV: RRR, no murmurs  Lungs: crackles   Abd: Soft. NTND  Extr: wwp, no edema  Skin: no rash  Neuro: No focal deficits  Lines: clean      TESTS & MEASUREMENTS:                        12.6   7.55  )-----------( 306      ( 12 Feb 2019 10:17 )             37.0     02-12    139  |  104  |  5<L>  ----------------------------<  125<H>  3.3<L>   |  20  |  0.6<L>    Ca    8.3<L>      12 Feb 2019 10:17  Mg     1.8     02-12    TPro  8.0  /  Alb  3.2<L>  /  TBili  0.3  /  DBili  0.2  /  AST  56<H>  /  ALT  22  /  AlkPhos  137<H>  02-12    LIVER FUNCTIONS - ( 12 Feb 2019 11:35 )  Alb: 3.2 g/dL / Pro: 8.0 g/dL / ALK PHOS: 137 U/L / ALT: 22 U/L / AST: 56 U/L / GGT: x             Culture - Blood (collected 02-11-19 @ 17:39)  Source: .Blood Blood  Preliminary Report (02-12-19 @ 23:01):    No growth to date.    Culture - Blood (collected 02-11-19 @ 17:39)  Source: .Blood Blood  Preliminary Report (02-12-19 @ 23:01):    No growth to date.          RADIOLOGY & ADDITIONAL TESTS:    ANTIBIOTICS:  azithromycin  IVPB   255 mL/Hr IV Intermittent (02-11-19 @ 18:42)    cefTRIAXone   IVPB   100 mL/Hr IV Intermittent (02-11-19 @ 18:42)    fluconAZOLE   Tablet   200 milliGRAM(s) Oral (02-11-19 @ 19:04)    fluconAZOLE   Tablet   100 milliGRAM(s) Oral (02-12-19 @ 11:16)    fluconAZOLE IVPB   100 mL/Hr IV Intermittent (02-12-19 @ 11:15)    fluconAZOLE IVPB   100 mL/Hr IV Intermittent (02-13-19 @ 08:58)    nystatin    Suspension   [id number] Unit(s) Oral (02-13-19 @ 05:51)   [id number] Unit(s) Oral (02-13-19 @ 00:32)   [id number] Unit(s) Oral (02-12-19 @ 18:29)   [id number] Unit(s) Oral (02-12-19 @ 11:16)   [id number] Unit(s) Oral (02-12-19 @ 06:00)   [id number] Unit(s) Oral (02-11-19 @ 23:12)    trimethoprim / sulfamethoxazole IVPB   518.75 mL/Hr IV Intermittent (02-13-19 @ 05:51)   518.75 mL/Hr IV Intermittent (02-13-19 @ 00:32)   518.75 mL/Hr IV Intermittent (02-12-19 @ 18:29)   518.75 mL/Hr IV Intermittent (02-12-19 @ 13:43)   518.75 mL/Hr IV Intermittent (02-12-19 @ 06:00)   518.75 mL/Hr IV Intermittent (02-12-19 @ 00:45)        fluconAZOLE   Tablet 100 milliGRAM(s) Oral daily  fluconAZOLE IVPB      fluconAZOLE IVPB 200 milliGRAM(s) IV Intermittent every 24 hours  nystatin    Suspension [id number] Unit(s) Oral four times a day  trimethoprim / sulfamethoxazole IVPB 300 milliGRAM(s) IV Intermittent four times a day

## 2019-02-13 NOTE — PROGRESS NOTE ADULT - SUBJECTIVE AND OBJECTIVE BOX
"While pt being wheeled out of pre-op area, pt was looking in her purse and saying that something was missing.  Earlier when the CRNA was starting an IV, pt's purse was placed on top of block cart in room - purse fell, and RN discovered bottle of rum underneath block cart in room, partially emptied and spilled on the floor (presumably from fall).  Pt stated clinic RN told her she may need alcohol after procedure and brought it herself \"incase the hospital didn't have any\".  Writer explained to patient that bottle would have to be wasted and thrown away.  Writer wasted what was left in rum bottle, witnessed by Hans Goldstein RN (charge).  Anesthesia notified in case more alcohol was imbibed than implied.  " SUBJECTIVE:    Doing well. No complaints. Will get CT chest to r/o cavitary lesion.    PAST MEDICAL & SURGICAL HISTORY  No pertinent past medical history    SOCIAL HISTORY:  Negative for smoking/alcohol/drug use.     ALLERGIES:  No Known Allergies    MEDICATIONS:  STANDING MEDICATIONS  dolutegravir 50 milliGRAM(s) Oral daily  emtricitabine 200 mG/tenofovir alafenamide 25 mG (DESCOVY) Tablet 1 Tablet(s) Oral daily  multivitamin 1 Tablet(s) Oral daily  potassium chloride    Tablet ER 20 milliEquivalent(s) Oral every 2 hours  predniSONE   Tablet 40 milliGRAM(s) Oral two times a day  sodium chloride 0.9% lock flush 3 milliLiter(s) IV Push every 8 hours  trimethoprim / sulfamethoxazole IVPB 300 milliGRAM(s) IV Intermittent four times a day    PRN MEDICATIONS  acetaminophen   Tablet .. 650 milliGRAM(s) Oral every 6 hours PRN    VITALS:   T(F): 98.1  HR: 70  BP: 98/58  RR: 20  SpO2: 99%    LABS:                        12.6   7.55  )-----------( 306      ( 12 Feb 2019 10:17 )             37.0     02-12    139  |  104  |  5<L>  ----------------------------<  125<H>  3.3<L>   |  20  |  0.6<L>    Ca    8.3<L>      12 Feb 2019 10:17  Mg     1.8     02-12    TPro  8.0  /  Alb  3.2<L>  /  TBili  0.3  /  DBili  0.2  /  AST  56<H>  /  ALT  22  /  AlkPhos  137<H>  02-12    Culture - Blood (collected 11 Feb 2019 17:39)  Source: .Blood Blood  Preliminary Report (12 Feb 2019 23:01):    No growth to date.    Culture - Blood (collected 11 Feb 2019 17:39)  Source: .Blood Blood  Preliminary Report (12 Feb 2019 23:01):    No growth to date.    PHYSICAL EXAM:  GEN: NAD, comfortable  ORAL: no thrush noted  LUNGS: CTAB, no w/r/r  HEART: RRR, no m/r/g  ABD: soft, NT/ND, +BS  EXT: no edema, PP b/l  NEURO: AAOx3 SUBJECTIVE:    Still complaining of CUEVA. Odynophagia / dysphagia resolved. Will get CT chest to r/o cavitary lesion.    PAST MEDICAL & SURGICAL HISTORY  No pertinent past medical history    SOCIAL HISTORY:  Negative for smoking/alcohol/drug use.     ALLERGIES:  No Known Allergies    MEDICATIONS:  STANDING MEDICATIONS  dolutegravir 50 milliGRAM(s) Oral daily  emtricitabine 200 mG/tenofovir alafenamide 25 mG (DESCOVY) Tablet 1 Tablet(s) Oral daily  multivitamin 1 Tablet(s) Oral daily  potassium chloride    Tablet ER 20 milliEquivalent(s) Oral every 2 hours  predniSONE   Tablet 40 milliGRAM(s) Oral two times a day  sodium chloride 0.9% lock flush 3 milliLiter(s) IV Push every 8 hours  trimethoprim / sulfamethoxazole IVPB 300 milliGRAM(s) IV Intermittent four times a day    PRN MEDICATIONS  acetaminophen   Tablet .. 650 milliGRAM(s) Oral every 6 hours PRN    VITALS:   T(F): 98.1  HR: 70  BP: 98/58  RR: 20  SpO2: 99%    LABS:                        12.6   7.55  )-----------( 306      ( 12 Feb 2019 10:17 )             37.0     02-12    139  |  104  |  5<L>  ----------------------------<  125<H>  3.3<L>   |  20  |  0.6<L>    Ca    8.3<L>      12 Feb 2019 10:17  Mg     1.8     02-12    TPro  8.0  /  Alb  3.2<L>  /  TBili  0.3  /  DBili  0.2  /  AST  56<H>  /  ALT  22  /  AlkPhos  137<H>  02-12    Culture - Blood (collected 11 Feb 2019 17:39)  Source: .Blood Blood  Preliminary Report (12 Feb 2019 23:01):    No growth to date.    Culture - Blood (collected 11 Feb 2019 17:39)  Source: .Blood Blood  Preliminary Report (12 Feb 2019 23:01):    No growth to date.    PHYSICAL EXAM:  GEN: NAD, comfortable  ORAL: no thrush noted  LUNGS: CTAB, no w/r/r  HEART: RRR, no m/r/g  ABD: soft, NT/ND, +BS  EXT: no edema, PP b/l  NEURO: AAOx3

## 2019-02-13 NOTE — CHART NOTE - NSCHARTNOTEFT_GEN_A_CORE
Attempted to assess pt, however, pt is Yi-speaking and staff unable to locate  phone. Pt denies english-speaking family. Pt likely to meet criteria for PCM. Will reattempt tomorrow.

## 2019-02-13 NOTE — PROGRESS NOTE ADULT - ASSESSMENT
39 yo F with no significant PMH presented complaining of sore throat, 40 lb weight loss, and decreased appetite since 1 month associated with fever and productive cough since 1 week PTP.    #) AIDS, new diagnosis  - CD4=70  - ID following - Rx starting ART (Descovy Dolutegravir), Bactrim IV, Prednisone, Fluconazole IV, CT chest, AFB cultures  - viral load + multiple other tests pending    #) PCP pneumonia  - LDH elevated = 491, CXR = diffuse b/l interstitial opacities  - c/w IV Bactrim 300 q6h + Prednisone taper    #) Dysphagia 2/2 severe oral thrush/ weight loss / decrease appetite 2/2 HIV - c/w Fluconazole  q24h    DVT ppx: Lovenox subQ  Diet: regular  Activity: AAT  Code status: FULL  Dispo: anticipate home

## 2019-02-14 LAB
ALBUMIN SERPL ELPH-MCNC: 2.7 G/DL — LOW (ref 3.5–5.2)
ALP SERPL-CCNC: 112 U/L — SIGNIFICANT CHANGE UP (ref 30–115)
ALT FLD-CCNC: 26 U/L — SIGNIFICANT CHANGE UP (ref 0–41)
ANION GAP SERPL CALC-SCNC: 18 MMOL/L — HIGH (ref 7–14)
AST SERPL-CCNC: 66 U/L — HIGH (ref 0–41)
BASOPHILS # BLD AUTO: 0.02 K/UL — SIGNIFICANT CHANGE UP (ref 0–0.2)
BASOPHILS NFR BLD AUTO: 0.1 % — SIGNIFICANT CHANGE UP (ref 0–1)
BILIRUB SERPL-MCNC: 0.2 MG/DL — SIGNIFICANT CHANGE UP (ref 0.2–1.2)
BUN SERPL-MCNC: 9 MG/DL — LOW (ref 10–20)
CALCIUM SERPL-MCNC: 8.7 MG/DL — SIGNIFICANT CHANGE UP (ref 8.5–10.1)
CHLORIDE SERPL-SCNC: 103 MMOL/L — SIGNIFICANT CHANGE UP (ref 98–110)
CMV IGG FLD QL: 8.2 U/ML — HIGH
CMV IGG SERPL-IMP: POSITIVE
CO2 SERPL-SCNC: 19 MMOL/L — SIGNIFICANT CHANGE UP (ref 17–32)
CREAT SERPL-MCNC: 0.7 MG/DL — SIGNIFICANT CHANGE UP (ref 0.7–1.5)
EOSINOPHIL # BLD AUTO: 0.01 K/UL — SIGNIFICANT CHANGE UP (ref 0–0.7)
EOSINOPHIL NFR BLD AUTO: 0.1 % — SIGNIFICANT CHANGE UP (ref 0–8)
GAMMA INTERFERON BACKGROUND BLD IA-ACNC: 0.07 IU/ML — SIGNIFICANT CHANGE UP
GLUCOSE SERPL-MCNC: 154 MG/DL — HIGH (ref 70–99)
HCT VFR BLD CALC: 35.8 % — LOW (ref 37–47)
HGB BLD-MCNC: 11.9 G/DL — LOW (ref 12–16)
IMM GRANULOCYTES NFR BLD AUTO: 0.9 % — HIGH (ref 0.1–0.3)
LYMPHOCYTES # BLD AUTO: 0.88 K/UL — LOW (ref 1.2–3.4)
LYMPHOCYTES # BLD AUTO: 6 % — LOW (ref 20.5–51.1)
M TB IFN-G BLD-IMP: ABNORMAL
M TB IFN-G CD4+ BCKGRND COR BLD-ACNC: 0 IU/ML — SIGNIFICANT CHANGE UP
M TB IFN-G CD4+CD8+ BCKGRND COR BLD-ACNC: -0.01 IU/ML — SIGNIFICANT CHANGE UP
MAGNESIUM SERPL-MCNC: 2 MG/DL — SIGNIFICANT CHANGE UP (ref 1.8–2.4)
MCHC RBC-ENTMCNC: 30.7 PG — SIGNIFICANT CHANGE UP (ref 27–31)
MCHC RBC-ENTMCNC: 33.2 G/DL — SIGNIFICANT CHANGE UP (ref 32–37)
MCV RBC AUTO: 92.3 FL — SIGNIFICANT CHANGE UP (ref 81–99)
MONOCYTES # BLD AUTO: 0.42 K/UL — SIGNIFICANT CHANGE UP (ref 0.1–0.6)
MONOCYTES NFR BLD AUTO: 2.9 % — SIGNIFICANT CHANGE UP (ref 1.7–9.3)
NEUTROPHILS # BLD AUTO: 13.2 K/UL — HIGH (ref 1.4–6.5)
NEUTROPHILS NFR BLD AUTO: 90 % — HIGH (ref 42.2–75.2)
NRBC # BLD: 0 /100 WBCS — SIGNIFICANT CHANGE UP (ref 0–0)
PLATELET # BLD AUTO: 364 K/UL — SIGNIFICANT CHANGE UP (ref 130–400)
POTASSIUM SERPL-MCNC: 4 MMOL/L — SIGNIFICANT CHANGE UP (ref 3.5–5)
POTASSIUM SERPL-SCNC: 4 MMOL/L — SIGNIFICANT CHANGE UP (ref 3.5–5)
PROT SERPL-MCNC: 6.9 G/DL — SIGNIFICANT CHANGE UP (ref 6–8)
QUANT TB PLUS MITOGEN MINUS NIL: 0.02 IU/ML — SIGNIFICANT CHANGE UP
RBC # BLD: 3.88 M/UL — LOW (ref 4.2–5.4)
RBC # FLD: 13.5 % — SIGNIFICANT CHANGE UP (ref 11.5–14.5)
SODIUM SERPL-SCNC: 140 MMOL/L — SIGNIFICANT CHANGE UP (ref 135–146)
T PALLIDUM AB TITR SER: NEGATIVE — SIGNIFICANT CHANGE UP
WBC # BLD: 14.66 K/UL — HIGH (ref 4.8–10.8)
WBC # FLD AUTO: 14.66 K/UL — HIGH (ref 4.8–10.8)

## 2019-02-14 RX ORDER — FLUCONAZOLE 150 MG/1
200 TABLET ORAL DAILY
Qty: 0 | Refills: 0 | Status: DISCONTINUED | OUTPATIENT
Start: 2019-02-14 | End: 2019-02-21

## 2019-02-14 RX ADMIN — Medication 518.75 MILLIGRAM(S): at 13:28

## 2019-02-14 RX ADMIN — FLUCONAZOLE 100 MILLIGRAM(S): 150 TABLET ORAL at 06:50

## 2019-02-14 RX ADMIN — EMTRICITABINE AND TENOFOVIR DISOPROXIL FUMARATE 1 TABLET(S): 200; 300 TABLET, FILM COATED ORAL at 13:29

## 2019-02-14 RX ADMIN — SODIUM CHLORIDE 3 MILLILITER(S): 9 INJECTION INTRAMUSCULAR; INTRAVENOUS; SUBCUTANEOUS at 05:21

## 2019-02-14 RX ADMIN — Medication 40 MILLIGRAM(S): at 18:44

## 2019-02-14 RX ADMIN — Medication 40 MILLIGRAM(S): at 05:21

## 2019-02-14 RX ADMIN — Medication 1 TABLET(S): at 13:28

## 2019-02-14 RX ADMIN — Medication 518.75 MILLIGRAM(S): at 18:45

## 2019-02-14 RX ADMIN — FLUCONAZOLE 200 MILLIGRAM(S): 150 TABLET ORAL at 18:44

## 2019-02-14 RX ADMIN — Medication 518.75 MILLIGRAM(S): at 05:19

## 2019-02-14 RX ADMIN — SODIUM CHLORIDE 3 MILLILITER(S): 9 INJECTION INTRAMUSCULAR; INTRAVENOUS; SUBCUTANEOUS at 13:36

## 2019-02-14 RX ADMIN — DOLUTEGRAVIR SODIUM 50 MILLIGRAM(S): 25 TABLET, FILM COATED ORAL at 13:29

## 2019-02-14 RX ADMIN — SODIUM CHLORIDE 3 MILLILITER(S): 9 INJECTION INTRAMUSCULAR; INTRAVENOUS; SUBCUTANEOUS at 21:53

## 2019-02-14 RX ADMIN — Medication 518.75 MILLIGRAM(S): at 23:16

## 2019-02-14 NOTE — CHART NOTE - NSCHARTNOTEFT_GEN_A_CORE
Upon Nutritional Assessment by the Registered Dietitian your patient was determined to meet criteria / has evidence of the following diagnosis/diagnoses:          [ ]  Mild Protein Calorie Malnutrition        [ ]  Moderate Protein Calorie Malnutrition        [X] Severe Protein Calorie Malnutrition        [ ] Unspecified Protein Calorie Malnutrition        [ ] Underweight / BMI <19        [ ] Morbid Obesity / BMI > 40    · Nutrient: Malnutrition; severe PCM in context of chronic illness  · Etiology: decreased appetite + increased needs r/t new AIDS dx (+ dysphagia r/t oral thrush?)  · Signs/Symptoms: pt w/ 17% wt loss in 1+ months, intake <75% of estimated needs x1+ months    Findings as based on:  •  Comprehensive nutrition assessment and consultation    Treatment:    The following diet has been recommended:  1. Continue w/ regular diet, no modifiers. 2. Consider Ensure Clear QID and Ensure Enlive QID.    PROVIDER Section:     By signing this assessment you are acknowledging and agree with the diagnosis/diagnoses assigned by the Registered Dietitian    Comments:

## 2019-02-14 NOTE — DIETITIAN INITIAL EVALUATION ADULT. - ENERGY NEEDS
energy needs: 1815-1970kcal (MSJx1.2-1.3AF) PCM, AIDS, and overwt BMI considered  protein needs: 71-89g (1.2-1.5g/kgIBW) pt w/ PCM and AIDS  fluid needs: per LIP

## 2019-02-14 NOTE — PROGRESS NOTE ADULT - SUBJECTIVE AND OBJECTIVE BOX
BARTONBELEN JUSTICE  38y, Female      OVERNIGHT EVENTS:  afebrile    ROS negative except as per above    VITALS:  T(F): 96.9, Max: 97.7 (02-13-19 @ 15:57)  HR: 89  BP: 102/59  RR: 18Vital Signs Last 24 Hrs  T(C): 36.1 (14 Feb 2019 05:47), Max: 36.5 (13 Feb 2019 15:57)  T(F): 96.9 (14 Feb 2019 05:47), Max: 97.7 (13 Feb 2019 15:57)  HR: 89 (14 Feb 2019 05:47) (86 - 89)  BP: 102/59 (14 Feb 2019 05:47) (102/59 - 117/68)  BP(mean): --  RR: 18 (14 Feb 2019 05:47) (18 - 18)  SpO2: 97% (13 Feb 2019 22:00) (96% - 97%)    PHYSICAL EXAM  Gen: Awake and alert,  HEENT: NCAT. EOMI. MMM. improved thrush  Neck: Supple, no cervical LAD  CV: RRR, no murmurs  Lungs: crackles   Abd: Soft. NTND  Extr: wwp, no edema  Skin: no rash  Neuro: No focal deficits  Lines: clean        TESTS & MEASUREMENTS:                        11.9   14.66 )-----------( 364      ( 14 Feb 2019 06:00 )             35.8     02-14    140  |  103  |  9<L>  ----------------------------<  154<H>  4.0   |  19  |  0.7    Ca    8.7      14 Feb 2019 06:00  Mg     2.0     02-14    TPro  6.9  /  Alb  2.7<L>  /  TBili  0.2  /  DBili  x   /  AST  66<H>  /  ALT  26  /  AlkPhos  112  02-14    LIVER FUNCTIONS - ( 14 Feb 2019 06:00 )  Alb: 2.7 g/dL / Pro: 6.9 g/dL / ALK PHOS: 112 U/L / ALT: 26 U/L / AST: 66 U/L / GGT: x             Culture - Blood (collected 02-11-19 @ 17:39)  Source: .Blood Blood  Preliminary Report (02-12-19 @ 23:01):    No growth to date.    Culture - Blood (collected 02-11-19 @ 17:39)  Source: .Blood Blood  Preliminary Report (02-12-19 @ 23:01):    No growth to date.          RADIOLOGY & ADDITIONAL TESTS:    ANTIBIOTICS:  azithromycin  IVPB   255 mL/Hr IV Intermittent (02-11-19 @ 18:42)    cefTRIAXone   IVPB   100 mL/Hr IV Intermittent (02-11-19 @ 18:42)    dolutegravir   50 milliGRAM(s) Oral (02-13-19 @ 17:00)    emtricitabine 200 mG/tenofovir alafenamide 25 mG (DESCOVY) Tablet   1 Tablet(s) Oral (02-13-19 @ 17:00)    fluconAZOLE   Tablet   200 milliGRAM(s) Oral (02-11-19 @ 19:04)    fluconAZOLE   Tablet   100 milliGRAM(s) Oral (02-12-19 @ 11:16)    fluconAZOLE IVPB   100 mL/Hr IV Intermittent (02-14-19 @ 06:50)    fluconAZOLE IVPB   100 mL/Hr IV Intermittent (02-12-19 @ 11:15)    fluconAZOLE IVPB   100 mL/Hr IV Intermittent (02-13-19 @ 08:58)    nystatin    Suspension   [id number] Unit(s) Oral (02-13-19 @ 11:22)   [id number] Unit(s) Oral (02-13-19 @ 05:51)   [id number] Unit(s) Oral (02-13-19 @ 00:32)   [id number] Unit(s) Oral (02-12-19 @ 18:29)   [id number] Unit(s) Oral (02-12-19 @ 11:16)   [id number] Unit(s) Oral (02-12-19 @ 06:00)   [id number] Unit(s) Oral (02-11-19 @ 23:12)    trimethoprim / sulfamethoxazole IVPB   518.75 mL/Hr IV Intermittent (02-14-19 @ 05:19)   518.75 mL/Hr IV Intermittent (02-13-19 @ 23:42)   518.75 mL/Hr IV Intermittent (02-13-19 @ 17:15)   518.75 mL/Hr IV Intermittent (02-13-19 @ 11:22)   518.75 mL/Hr IV Intermittent (02-13-19 @ 05:51)   518.75 mL/Hr IV Intermittent (02-13-19 @ 00:32)   518.75 mL/Hr IV Intermittent (02-12-19 @ 18:29)   518.75 mL/Hr IV Intermittent (02-12-19 @ 13:43)   518.75 mL/Hr IV Intermittent (02-12-19 @ 06:00)   518.75 mL/Hr IV Intermittent (02-12-19 @ 00:45)        dolutegravir 50 milliGRAM(s) Oral daily  emtricitabine 200 mG/tenofovir alafenamide 25 mG (DESCOVY) Tablet 1 Tablet(s) Oral daily  fluconAZOLE IVPB 200 milliGRAM(s) IV Intermittent every 24 hours  trimethoprim / sulfamethoxazole IVPB 300 milliGRAM(s) IV Intermittent four times a day MICK BELEN  38y, Female      OVERNIGHT EVENTS:  afebrile  continued SOB  emesis this AM    ROS negative except as per above    VITALS:  T(F): 96.9, Max: 97.7 (02-13-19 @ 15:57)  HR: 89  BP: 102/59  RR: 18Vital Signs Last 24 Hrs  T(C): 36.1 (14 Feb 2019 05:47), Max: 36.5 (13 Feb 2019 15:57)  T(F): 96.9 (14 Feb 2019 05:47), Max: 97.7 (13 Feb 2019 15:57)  HR: 89 (14 Feb 2019 05:47) (86 - 89)  BP: 102/59 (14 Feb 2019 05:47) (102/59 - 117/68)  BP(mean): --  RR: 18 (14 Feb 2019 05:47) (18 - 18)  SpO2: 97% (13 Feb 2019 22:00) (96% - 97%)    PHYSICAL EXAM  Gen: Awake and alert, on NC  HEENT: NCAT. EOMI. MMM. improved thrush  Neck: Supple, no cervical LAD  CV: RRR, no murmurs  Lungs: crackles   Abd: Soft. NTND  Extr: wwp, no edema  Skin: no rash  Neuro: No focal deficits  Lines: clean        TESTS & MEASUREMENTS:                        11.9   14.66 )-----------( 364      ( 14 Feb 2019 06:00 )             35.8     02-14    140  |  103  |  9<L>  ----------------------------<  154<H>  4.0   |  19  |  0.7    Ca    8.7      14 Feb 2019 06:00  Mg     2.0     02-14    TPro  6.9  /  Alb  2.7<L>  /  TBili  0.2  /  DBili  x   /  AST  66<H>  /  ALT  26  /  AlkPhos  112  02-14    LIVER FUNCTIONS - ( 14 Feb 2019 06:00 )  Alb: 2.7 g/dL / Pro: 6.9 g/dL / ALK PHOS: 112 U/L / ALT: 26 U/L / AST: 66 U/L / GGT: x             Culture - Blood (collected 02-11-19 @ 17:39)  Source: .Blood Blood  Preliminary Report (02-12-19 @ 23:01):    No growth to date.    Culture - Blood (collected 02-11-19 @ 17:39)  Source: .Blood Blood  Preliminary Report (02-12-19 @ 23:01):    No growth to date.          RADIOLOGY & ADDITIONAL TESTS:    ANTIBIOTICS:  azithromycin  IVPB   255 mL/Hr IV Intermittent (02-11-19 @ 18:42)    cefTRIAXone   IVPB   100 mL/Hr IV Intermittent (02-11-19 @ 18:42)    dolutegravir   50 milliGRAM(s) Oral (02-13-19 @ 17:00)    emtricitabine 200 mG/tenofovir alafenamide 25 mG (DESCOVY) Tablet   1 Tablet(s) Oral (02-13-19 @ 17:00)    fluconAZOLE   Tablet   200 milliGRAM(s) Oral (02-11-19 @ 19:04)    fluconAZOLE   Tablet   100 milliGRAM(s) Oral (02-12-19 @ 11:16)    fluconAZOLE IVPB   100 mL/Hr IV Intermittent (02-14-19 @ 06:50)    fluconAZOLE IVPB   100 mL/Hr IV Intermittent (02-12-19 @ 11:15)    fluconAZOLE IVPB   100 mL/Hr IV Intermittent (02-13-19 @ 08:58)    nystatin    Suspension   [id number] Unit(s) Oral (02-13-19 @ 11:22)   [id number] Unit(s) Oral (02-13-19 @ 05:51)   [id number] Unit(s) Oral (02-13-19 @ 00:32)   [id number] Unit(s) Oral (02-12-19 @ 18:29)   [id number] Unit(s) Oral (02-12-19 @ 11:16)   [id number] Unit(s) Oral (02-12-19 @ 06:00)   [id number] Unit(s) Oral (02-11-19 @ 23:12)    trimethoprim / sulfamethoxazole IVPB   518.75 mL/Hr IV Intermittent (02-14-19 @ 05:19)   518.75 mL/Hr IV Intermittent (02-13-19 @ 23:42)   518.75 mL/Hr IV Intermittent (02-13-19 @ 17:15)   518.75 mL/Hr IV Intermittent (02-13-19 @ 11:22)   518.75 mL/Hr IV Intermittent (02-13-19 @ 05:51)   518.75 mL/Hr IV Intermittent (02-13-19 @ 00:32)   518.75 mL/Hr IV Intermittent (02-12-19 @ 18:29)   518.75 mL/Hr IV Intermittent (02-12-19 @ 13:43)   518.75 mL/Hr IV Intermittent (02-12-19 @ 06:00)   518.75 mL/Hr IV Intermittent (02-12-19 @ 00:45)        dolutegravir 50 milliGRAM(s) Oral daily  emtricitabine 200 mG/tenofovir alafenamide 25 mG (DESCOVY) Tablet 1 Tablet(s) Oral daily  fluconAZOLE IVPB 200 milliGRAM(s) IV Intermittent every 24 hours  trimethoprim / sulfamethoxazole IVPB 300 milliGRAM(s) IV Intermittent four times a day

## 2019-02-14 NOTE — DIETITIAN INITIAL EVALUATION ADULT. - SOURCE
patient/Pt reports decrease in appetite and PO intake <75% of estimated needs x1 month. Reports poor appetite and sometimes nausea when food is consumed. Pt w/ 17% wt loss x1 month. Pt meets criteria for PCM. PT reports supplementing biotin and vit C PTA, NKFA. PT reports last BM 2/14- very little. Reports vomiting today. Pt denies chew/swallowing difficulty, however, dysphagia 2/2 severe thrush noted in EMR. Will f/u w/ LIP. BS 21, skin intact. Pt reports tolerating clear and full liquids well. Agreeable to ensure clear and ensure enlive. Pt reports inability to consume dairy at night time- causes diarrhea. patient/Pt reports decrease in appetite and PO intake <75% of estimated needs x1+ month. Reports poor appetite and sometimes nausea when food is consumed. Pt w/ 17% wt loss x1+ month. Pt meets criteria for PCM. PT reports supplementing biotin and vit C PTA, NKFA. PT reports last BM 2/14- very little. Reports vomiting today. Pt denies chew/swallowing difficulty, however, dysphagia 2/2 severe thrush noted in EMR?-Will f/u w/ LIP. BS 21, skin intact. Pt reports tolerating clear and full liquids well. Agreeable to ensure clear and ensure enlive. Pt reports inability to consume dairy at night time- causes diarrhea. BMI: 29.4. IBW: 59kg+/-10%

## 2019-02-14 NOTE — DIETITIAN INITIAL EVALUATION ADULT. - OTHER INFO
Reason for assessment: EMANUEL c/s. Pt presented to ED w/ chief complaint of sore throat, 40lb wt loss, decrease appetite r/t fever and productive cough. Pt found to have AIDS. Hospital course includes PCP PNA and severe oral thrush

## 2019-02-14 NOTE — PROGRESS NOTE ADULT - ASSESSMENT
BELEN BARTON 38y Female  MRN#: [id number]   CODE STATUS: Full code      SUBJECTIVE    Patient is a 38y old Female who presents with a chief complaint of dyspnea and weight loss.  Currently admitted to medicine with the primary diagnoses of AIDS, thrush, and pneumonia.    Interval History: Today is hospital day 3d. Overnight there were no events. Today she continues to feel poorly; still short of breath and with throat discomfort. Still coming to terms with new diagnosis.    HPI:   HPI:  38 yof with no significant pmh p/w cc of sore throat, 40 lb weight loss, decrease appetite since 1 month assoc with fever and productive cough since 1 week ptp;  - pt has been having dysphagia, sore throat since 1 month which is progressively worsening  - pt reports loosing 40 lb weight in 1 month period associated with decrease appetite;  - Fever assoc with productive cough with greenish white sputum started 1 week ago and has been progressively worsening; pt feels very weak since 1 week  - Pt currently has unprotected sex with one partner but reports to have many sexual partners in the past; [place] has been in [place] 4 years (11 Feb 2019 18:36)    Hospital Course:       PAST MEDICAL & SURGICAL HISTORY  No pertinent past medical history      ALLERGIES:  No Known Allergies      MEDICATIONS:  STANDING MEDICATIONS  dolutegravir 50 milliGRAM(s) Oral daily  emtricitabine 200 mG/tenofovir alafenamide 25 mG (DESCOVY) Tablet 1 Tablet(s) Oral daily  fluconAZOLE   Tablet 200 milliGRAM(s) Oral daily  multivitamin 1 Tablet(s) Oral daily  predniSONE   Tablet 40 milliGRAM(s) Oral two times a day  sodium chloride 0.9% lock flush 3 milliLiter(s) IV Push every 8 hours  trimethoprim / sulfamethoxazole IVPB 300 milliGRAM(s) IV Intermittent four times a day    PRN MEDICATIONS  acetaminophen   Tablet .. 650 milliGRAM(s) Oral every 6 hours PRN        OBJECTIVE    VITAL SIGNS: Last 24 Hours  T(C): 35.9 (14 Feb 2019 22:00), Max: 36.1 (14 Feb 2019 05:47)  T(F): 96.7 (14 Feb 2019 22:00), Max: 96.9 (14 Feb 2019 05:47)  HR: 76 (14 Feb 2019 22:00) (76 - 90)  BP: 102/56 (14 Feb 2019 22:00) (97/61 - 102/59)  BP(mean): --  RR: 20 (14 Feb 2019 22:00) (18 - 20)  SpO2: --      PHYSICAL EXAM:    GENERAL: No acute distress. On nasal cannula.  HEENT:  Atraumatic, Normocephalic. EOMI, PERRLA.  PULMONARY: Scattered crackles.  CARDIOVASCULAR: Regular rate and rhythm; No murmurs, rubs, or gallops.  MUSCULOSKELETAL: No edema.      LABS:                        11.9   14.66 )-----------( 364      ( 14 Feb 2019 06:00 )             35.8     02-14    140  |  103  |  9<L>  ----------------------------<  154<H>  4.0   |  19  |  0.7    Ca    8.7      14 Feb 2019 06:00  Mg     2.0     02-14    TPro  6.9  /  Alb  2.7<L>  /  TBili  0.2  /  DBili  x   /  AST  66<H>  /  ALT  26  /  AlkPhos  112  02-14      RADIOLOGY:        ASSESSMENT AND PLAN    37 yo with no significant pmh p/w cc of sore throat, 40 lb weight loss, decrease appetite since 1 month assoc with fever and productive cough since 1 week ptp.    HIV infection  - ID consult  - check cd4, hiv pcr  - pt advised to inform partners for HIV testing    Pneumonia  - check LDH  - empiric rocephin + azithro; empiric bactrim 300 mg iv q6h;   - f/u cultures  - sputum cultures    Dysphagia 2/2 severe Oral thrush/ weight loss/ Decrease appetite 2/2 HIV  - s/p fluconazole loading 200mg one dose; start fluconazole 100mg;   - start nystatin swish  -  ID cs    Dvt ppx  - sc lovenox    Dispo  - home

## 2019-02-14 NOTE — PROGRESS NOTE ADULT - ASSESSMENT
43F from [place]    Admitted with SOB, odynophagia, exam with candida with presumed esophagitis and likely PCP PNA given CXR findings and elevated LDH  New diagnosis of Symptomatic AIDS CD4 70; 5%  CT Chest Extensive upper lobe predominant ground glass opacities bilaterally, in keeping with history of PCP. No cavitary lesions or evidence of granulomatous disease.    - Continue IV bactrim 300mg q6h  - Prednisone 40mg BID x5 days, 40mg daily x5 days, 20mg daily x11 days  - CHANGE IV to PO  fluc 200mg q24h (monitor LFTs)  - ART: Descovy + Dolutegravir 50mg daily   - AFB x3 and airborne, low suspicion for TB, findings consistent with PCP   - HIV VL, fungitell to confirm PCP testing, quantiferon gold (low suspicion for active TB), CMV IgG, RPR    Spectra [id number] 43F from [place]    Admitted with SOB, odynophagia, exam with candida with presumed esophagitis and likely PCP PNA given CXR findings and elevated LDH  New diagnosis of Symptomatic AIDS CD4 70; 5%  CT Chest Extensive upper lobe predominant ground glass opacities bilaterally, in keeping with history of PCP. No cavitary lesions or evidence of granulomatous disease.    - Continue IV bactrim 300mg q6h  - Prednisone 40mg BID x5 days, 40mg daily x5 days, 20mg daily x11 days  - IV fluc 200mg q24h (monitor LFTs)  - ART: Descovy + Dolutegravir 50mg daily   - AFB x3 and airborne, low suspicion for TB, findings consistent with PCP   - HIV VL, fungitell to confirm PCP testing, quantiferon gold (low suspicion for active TB), CMV IgG, RPR    Spectra [phone]

## 2019-02-15 LAB
ALBUMIN SERPL ELPH-MCNC: 2.9 G/DL — LOW (ref 3.5–5.2)
ALP SERPL-CCNC: 117 U/L — HIGH (ref 30–115)
ALT FLD-CCNC: 28 U/L — SIGNIFICANT CHANGE UP (ref 0–41)
ANION GAP SERPL CALC-SCNC: 18 MMOL/L — HIGH (ref 7–14)
AST SERPL-CCNC: 47 U/L — HIGH (ref 0–41)
BASOPHILS # BLD AUTO: 0.01 K/UL — SIGNIFICANT CHANGE UP (ref 0–0.2)
BASOPHILS NFR BLD AUTO: 0.1 % — SIGNIFICANT CHANGE UP (ref 0–1)
BILIRUB SERPL-MCNC: 0.2 MG/DL — SIGNIFICANT CHANGE UP (ref 0.2–1.2)
BUN SERPL-MCNC: 10 MG/DL — SIGNIFICANT CHANGE UP (ref 10–20)
CALCIUM SERPL-MCNC: 8.6 MG/DL — SIGNIFICANT CHANGE UP (ref 8.5–10.1)
CHLORIDE SERPL-SCNC: 97 MMOL/L — LOW (ref 98–110)
CO2 SERPL-SCNC: 19 MMOL/L — SIGNIFICANT CHANGE UP (ref 17–32)
CREAT SERPL-MCNC: 0.7 MG/DL — SIGNIFICANT CHANGE UP (ref 0.7–1.5)
EOSINOPHIL # BLD AUTO: 0 K/UL — SIGNIFICANT CHANGE UP (ref 0–0.7)
EOSINOPHIL NFR BLD AUTO: 0 % — SIGNIFICANT CHANGE UP (ref 0–8)
FUNGITELL: >500 PG/ML — HIGH
GLUCOSE SERPL-MCNC: 270 MG/DL — HIGH (ref 70–99)
HCT VFR BLD CALC: 38.5 % — SIGNIFICANT CHANGE UP (ref 37–47)
HGB BLD-MCNC: 12.6 G/DL — SIGNIFICANT CHANGE UP (ref 12–16)
HIV-1 VIRAL LOAD RESULT: DETECTED
HIV1 RNA # SERPL NAA+PROBE: SIGNIFICANT CHANGE UP
HIV1 RNA SERPL NAA+PROBE-ACNC: DETECTED
HIV1 RNA SERPL NAA+PROBE-LOG#: 5.86 LG COP/ML — SIGNIFICANT CHANGE UP
IMM GRANULOCYTES NFR BLD AUTO: 0.9 % — HIGH (ref 0.1–0.3)
LYMPHOCYTES # BLD AUTO: 0.92 K/UL — LOW (ref 1.2–3.4)
LYMPHOCYTES # BLD AUTO: 13.7 % — LOW (ref 20.5–51.1)
MCHC RBC-ENTMCNC: 30.7 PG — SIGNIFICANT CHANGE UP (ref 27–31)
MCHC RBC-ENTMCNC: 32.7 G/DL — SIGNIFICANT CHANGE UP (ref 32–37)
MCV RBC AUTO: 93.7 FL — SIGNIFICANT CHANGE UP (ref 81–99)
MONOCYTES # BLD AUTO: 0.22 K/UL — SIGNIFICANT CHANGE UP (ref 0.1–0.6)
MONOCYTES NFR BLD AUTO: 3.3 % — SIGNIFICANT CHANGE UP (ref 1.7–9.3)
NEUTROPHILS # BLD AUTO: 5.52 K/UL — SIGNIFICANT CHANGE UP (ref 1.4–6.5)
NEUTROPHILS NFR BLD AUTO: 82 % — HIGH (ref 42.2–75.2)
NRBC # BLD: 0 /100 WBCS — SIGNIFICANT CHANGE UP (ref 0–0)
PLATELET # BLD AUTO: 392 K/UL — SIGNIFICANT CHANGE UP (ref 130–400)
POTASSIUM SERPL-MCNC: 4.7 MMOL/L — SIGNIFICANT CHANGE UP (ref 3.5–5)
POTASSIUM SERPL-SCNC: 4.7 MMOL/L — SIGNIFICANT CHANGE UP (ref 3.5–5)
PROT SERPL-MCNC: 7.4 G/DL — SIGNIFICANT CHANGE UP (ref 6–8)
RBC # BLD: 4.11 M/UL — LOW (ref 4.2–5.4)
RBC # FLD: 13.5 % — SIGNIFICANT CHANGE UP (ref 11.5–14.5)
SODIUM SERPL-SCNC: 134 MMOL/L — LOW (ref 135–146)
WBC # BLD: 6.73 K/UL — SIGNIFICANT CHANGE UP (ref 4.8–10.8)
WBC # FLD AUTO: 6.73 K/UL — SIGNIFICANT CHANGE UP (ref 4.8–10.8)

## 2019-02-15 RX ORDER — SODIUM CHLORIDE 9 MG/ML
3 INJECTION INTRAMUSCULAR; INTRAVENOUS; SUBCUTANEOUS ONCE
Qty: 0 | Refills: 0 | Status: COMPLETED | OUTPATIENT
Start: 2019-02-15 | End: 2019-02-16

## 2019-02-15 RX ORDER — CHLORHEXIDINE GLUCONATE 213 G/1000ML
1 SOLUTION TOPICAL
Qty: 0 | Refills: 0 | Status: DISCONTINUED | OUTPATIENT
Start: 2019-02-15 | End: 2019-02-21

## 2019-02-15 RX ADMIN — SODIUM CHLORIDE 3 MILLILITER(S): 9 INJECTION INTRAMUSCULAR; INTRAVENOUS; SUBCUTANEOUS at 06:17

## 2019-02-15 RX ADMIN — SODIUM CHLORIDE 3 MILLILITER(S): 9 INJECTION INTRAMUSCULAR; INTRAVENOUS; SUBCUTANEOUS at 21:54

## 2019-02-15 RX ADMIN — SODIUM CHLORIDE 3 MILLILITER(S): 9 INJECTION INTRAMUSCULAR; INTRAVENOUS; SUBCUTANEOUS at 13:14

## 2019-02-15 RX ADMIN — DOLUTEGRAVIR SODIUM 50 MILLIGRAM(S): 25 TABLET, FILM COATED ORAL at 12:06

## 2019-02-15 RX ADMIN — Medication 40 MILLIGRAM(S): at 18:32

## 2019-02-15 RX ADMIN — Medication 518.75 MILLIGRAM(S): at 18:32

## 2019-02-15 RX ADMIN — Medication 518.75 MILLIGRAM(S): at 06:05

## 2019-02-15 RX ADMIN — Medication 40 MILLIGRAM(S): at 06:05

## 2019-02-15 RX ADMIN — FLUCONAZOLE 200 MILLIGRAM(S): 150 TABLET ORAL at 12:06

## 2019-02-15 RX ADMIN — EMTRICITABINE AND TENOFOVIR DISOPROXIL FUMARATE 1 TABLET(S): 200; 300 TABLET, FILM COATED ORAL at 12:06

## 2019-02-15 RX ADMIN — Medication 1 TABLET(S): at 12:06

## 2019-02-15 RX ADMIN — Medication 518.75 MILLIGRAM(S): at 12:05

## 2019-02-15 NOTE — PROGRESS NOTE ADULT - SUBJECTIVE AND OBJECTIVE BOX
BELEN BARTON  38y, Female      OVERNIGHT EVENTS:  no acute events overnight  +odynophagia    ROS negative except as per above    VITALS:  T(F): 97, Max: 97 (02-15-19 @ 05:35)  HR: 63  BP: 95/54  RR: 20Vital Signs Last 24 Hrs  T(C): 36.1 (15 Feb 2019 05:35), Max: 36.1 (15 Feb 2019 05:35)  T(F): 97 (15 Feb 2019 05:35), Max: 97 (15 Feb 2019 05:35)  HR: 63 (15 Feb 2019 05:35) (63 - 90)  BP: 95/54 (15 Feb 2019 05:35) (95/54 - 102/56)  BP(mean): --  RR: 20 (15 Feb 2019 05:35) (18 - 20)  SpO2: --    PHYSICAL EXAM  Gen: Awake and alert, on NC  HEENT: NCAT. EOMI. MMM. improved thrush  Neck: Supple, no cervical LAD  CV: RRR, no murmurs  Lungs: CTAB  Abd: Soft. NTND  Extr: wwp, no edema  Skin: no rash  Neuro: No focal deficits  Lines: clean      TESTS & MEASUREMENTS:                        11.9   14.66 )-----------( 364      ( 14 Feb 2019 06:00 )             35.8     02-15    134<L>  |  97<L>  |  10  ----------------------------<  270<H>  4.7   |  19  |  0.7    Ca    8.6      15 Feb 2019 07:19  Mg     2.0     02-14    TPro  7.4  /  Alb  2.9<L>  /  TBili  0.2  /  DBili  x   /  AST  47<H>  /  ALT  28  /  AlkPhos  117<H>  02-15    LIVER FUNCTIONS - ( 15 Feb 2019 07:19 )  Alb: 2.9 g/dL / Pro: 7.4 g/dL / ALK PHOS: 117 U/L / ALT: 28 U/L / AST: 47 U/L / GGT: x             Culture - Blood (collected 02-11-19 @ 17:39)  Source: .Blood Blood  Preliminary Report (02-12-19 @ 23:01):    No growth to date.    Culture - Blood (collected 02-11-19 @ 17:39)  Source: .Blood Blood  Preliminary Report (02-12-19 @ 23:01):    No growth to date.          RADIOLOGY & ADDITIONAL TESTS:    ANTIBIOTICS:  azithromycin  IVPB   255 mL/Hr IV Intermittent (02-11-19 @ 18:42)    cefTRIAXone   IVPB   100 mL/Hr IV Intermittent (02-11-19 @ 18:42)    dolutegravir   50 milliGRAM(s) Oral (02-14-19 @ 13:29)   50 milliGRAM(s) Oral (02-13-19 @ 17:00)    emtricitabine 200 mG/tenofovir alafenamide 25 mG (DESCOVY) Tablet   1 Tablet(s) Oral (02-14-19 @ 13:29)   1 Tablet(s) Oral (02-13-19 @ 17:00)    fluconAZOLE   Tablet   200 milliGRAM(s) Oral (02-14-19 @ 18:44)    fluconAZOLE   Tablet   200 milliGRAM(s) Oral (02-11-19 @ 19:04)    fluconAZOLE   Tablet   100 milliGRAM(s) Oral (02-12-19 @ 11:16)    fluconAZOLE IVPB   100 mL/Hr IV Intermittent (02-14-19 @ 06:50)    fluconAZOLE IVPB   100 mL/Hr IV Intermittent (02-12-19 @ 11:15)    fluconAZOLE IVPB   100 mL/Hr IV Intermittent (02-13-19 @ 08:58)    nystatin    Suspension   [id number] Unit(s) Oral (02-13-19 @ 11:22)   [id number] Unit(s) Oral (02-13-19 @ 05:51)   [id number] Unit(s) Oral (02-13-19 @ 00:32)   [id number] Unit(s) Oral (02-12-19 @ 18:29)   [id number] Unit(s) Oral (02-12-19 @ 11:16)   [id number] Unit(s) Oral (02-12-19 @ 06:00)   [id number] Unit(s) Oral (02-11-19 @ 23:12)    trimethoprim / sulfamethoxazole IVPB   518.75 mL/Hr IV Intermittent (02-15-19 @ 06:05)   518.75 mL/Hr IV Intermittent (02-14-19 @ 23:16)   518.75 mL/Hr IV Intermittent (02-14-19 @ 18:45)   518.75 mL/Hr IV Intermittent (02-14-19 @ 13:28)   518.75 mL/Hr IV Intermittent (02-14-19 @ 05:19)   518.75 mL/Hr IV Intermittent (02-13-19 @ 23:42)   518.75 mL/Hr IV Intermittent (02-13-19 @ 17:15)   518.75 mL/Hr IV Intermittent (02-13-19 @ 11:22)   518.75 mL/Hr IV Intermittent (02-13-19 @ 05:51)   518.75 mL/Hr IV Intermittent (02-13-19 @ 00:32)   518.75 mL/Hr IV Intermittent (02-12-19 @ 18:29)   518.75 mL/Hr IV Intermittent (02-12-19 @ 13:43)   518.75 mL/Hr IV Intermittent (02-12-19 @ 06:00)   518.75 mL/Hr IV Intermittent (02-12-19 @ 00:45)        dolutegravir 50 milliGRAM(s) Oral daily  emtricitabine 200 mG/tenofovir alafenamide 25 mG (DESCOVY) Tablet 1 Tablet(s) Oral daily  fluconAZOLE   Tablet 200 milliGRAM(s) Oral daily  trimethoprim / sulfamethoxazole IVPB 300 milliGRAM(s) IV Intermittent four times a day

## 2019-02-15 NOTE — PROGRESS NOTE ADULT - ASSESSMENT
BELEN BARTON 38y Female  MRN#: [id number]   CODE STATUS: Full code      SUBJECTIVE    Patient is a 38y old Female who presents with a chief complaint of dyspnea and weight loss.  Currently admitted to medicine with the primary diagnoses of AIDS, thrush, and pneumonia.    Interval History: Today is hospital day 4d. Overnight there were no events. Today she is doing better than yesterday; shortness of breath improved.    HPI:   HPI:  38 yof with no significant pmh p/w cc of sore throat, 40 lb weight loss, decrease appetite since 1 month assoc with fever and productive cough since 1 week ptp;  - pt has been having dysphagia, sore throat since 1 month which is progressively worsening  - pt reports loosing 40 lb weight in 1 month period associated with decrease appetite;  - Fever assoc with productive cough with greenish white sputum started 1 week ago and has been progressively worsening; pt feels very weak since 1 week  - Pt currently has unprotected sex with one partner but reports to have many sexual partners in the past; [place] has been in [place] (11 Feb 2019 18:36)    Hospital Course:       PAST MEDICAL & SURGICAL HISTORY  No pertinent past medical history      ALLERGIES:  No Known Allergies      MEDICATIONS:  STANDING MEDICATIONS  chlorhexidine 4% Liquid 1 Application(s) Topical <User Schedule>  dolutegravir 50 milliGRAM(s) Oral daily  emtricitabine 200 mG/tenofovir alafenamide 25 mG (DESCOVY) Tablet 1 Tablet(s) Oral daily  fluconAZOLE   Tablet 200 milliGRAM(s) Oral daily  multivitamin 1 Tablet(s) Oral daily  predniSONE   Tablet 40 milliGRAM(s) Oral two times a day  sodium chloride 0.9% lock flush 3 milliLiter(s) IV Push every 8 hours  sodium chloride 7% Inhalation 3 milliLiter(s) Inhalation once  trimethoprim / sulfamethoxazole IVPB 300 milliGRAM(s) IV Intermittent four times a day    PRN MEDICATIONS  acetaminophen   Tablet .. 650 milliGRAM(s) Oral every 6 hours PRN        OBJECTIVE    VITAL SIGNS: Last 24 Hours  T(C): 36.1 (15 Feb 2019 05:35), Max: 36.1 (15 Feb 2019 05:35)  T(F): 97 (15 Feb 2019 05:35), Max: 97 (15 Feb 2019 05:35)  HR: 63 (15 Feb 2019 14:39) (63 - 76)  BP: 100/64 (15 Feb 2019 14:39) (95/54 - 102/56)  BP(mean): --  RR: 18 (15 Feb 2019 14:39) (18 - 20)  SpO2: --      PHYSICAL EXAM:    GENERAL: No acute distress. On nasal cannula.  HEENT:  Atraumatic, Normocephalic. EOMI, PERRLA.  PULMONARY: Scattered crackles.  CARDIOVASCULAR: Regular rate and rhythm; No murmurs, rubs, or gallops.  MUSCULOSKELETAL: No edema.      LABS:                        12.6   6.73  )-----------( 392      ( 15 Feb 2019 07:19 )             38.5     02-15    134<L>  |  97<L>  |  10  ----------------------------<  270<H>  4.7   |  19  |  0.7    Ca    8.6      15 Feb 2019 07:19  Mg     2.0     02-14    TPro  7.4  /  Alb  2.9<L>  /  TBili  0.2  /  DBili  x   /  AST  47<H>  /  ALT  28  /  AlkPhos  117<H>  02-15      RADIOLOGY:    < from: CT Chest No Cont (02.13.19 @ 19:57) >  Extensive upper lobe predominant ground glass opacities bilaterally, in   keeping with history of PCP.    No cavitary lesions or evidence of granulomatous disease.    < end of copied text >        ASSESSMENT AND PLAN    39 yo with no significant pmh p/w cc of sore throat, 40 lb weight loss, decrease appetite since 1 month assoc with fever and productive cough since 1 week ptp.    AIDS  - CD4 76  - C/w ART  - F/u viral load  - pt advised to inform partners for HIV testing    Pneumonia  - Likely PCP; rule out TB  - BCx- to date  - LDH elevated  - Quantiferon equivocal  - CT findings consistent with PCP; no granulomas or cavitation  - F/u 3x sputum induction  - C/w bactrim 300 mg iv q6h  - Monitor WBC's, fever curve    Dysphagia 2/2 severe Oral thrush/ weight loss/ Decrease appetite 2/2 HIV  - C/w fluconazole    Dvt ppx  - sc lovenox      Dispo: home likely Monday

## 2019-02-15 NOTE — PROGRESS NOTE ADULT - ASSESSMENT
43F from [place]    Admitted with SOB, odynophagia, exam with candida with presumed esophagitis and likely PCP PNA given CXR findings and elevated LDH  New diagnosis of Symptomatic AIDS CD4 70; 5%  CT Chest Extensive upper lobe predominant ground glass opacities bilaterally, in keeping with history of PCP. No cavitary lesions or evidence of granulomatous disease.  Indeterminate quantiferon gold (low suspicion for active TB)  Toxo IgG neg, CMV IgG +, RPR neg    - Continue IV bactrim 300mg q6h for PCP PNA  - Prednisone 40mg BID x5 days, 40mg daily x5 days, 20mg daily x11 days  - change to PO fluc 200mg q24h (monitor LFTs) for esophageal candidiasis (14 days end 2/24)  - ART: Descovy + Dolutegravir 50mg daily (on d/c can change to biktarvy if emblem covers)  - AFB x3 and airborne, lower suspicion for TB, findings consistent with PCP   - HIV VL, fungitell to confirm PCP testing    Spectra [phone]

## 2019-02-16 LAB
ABACAVIR ISLT GENOTYP: SIGNIFICANT CHANGE UP
ALBUMIN SERPL ELPH-MCNC: 3.3 G/DL — LOW (ref 3.5–5.2)
ALP SERPL-CCNC: 129 U/L — HIGH (ref 30–115)
ALT FLD-CCNC: 42 U/L — HIGH (ref 0–41)
ANION GAP SERPL CALC-SCNC: 15 MMOL/L — HIGH (ref 7–14)
AST SERPL-CCNC: 45 U/L — HIGH (ref 0–41)
ATAZANAVIR+RITONAVIR ISLT GENOTYP: SIGNIFICANT CHANGE UP
BASOPHILS # BLD AUTO: 0.01 K/UL — SIGNIFICANT CHANGE UP (ref 0–0.2)
BASOPHILS NFR BLD AUTO: 0.1 % — SIGNIFICANT CHANGE UP (ref 0–1)
BILIRUB SERPL-MCNC: 0.2 MG/DL — SIGNIFICANT CHANGE UP (ref 0.2–1.2)
BUN SERPL-MCNC: 12 MG/DL — SIGNIFICANT CHANGE UP (ref 10–20)
CALCIUM SERPL-MCNC: 9.5 MG/DL — SIGNIFICANT CHANGE UP (ref 8.5–10.1)
CHLORIDE SERPL-SCNC: 102 MMOL/L — SIGNIFICANT CHANGE UP (ref 98–110)
CO2 SERPL-SCNC: 16 MMOL/L — LOW (ref 17–32)
CREAT SERPL-MCNC: 0.7 MG/DL — SIGNIFICANT CHANGE UP (ref 0.7–1.5)
CULTURE RESULTS: SIGNIFICANT CHANGE UP
CULTURE RESULTS: SIGNIFICANT CHANGE UP
DARUNAVIR+RITONAVIR ISLT GENOTYP: SIGNIFICANT CHANGE UP
DIDANOSINE ISLT GENOTYP: SIGNIFICANT CHANGE UP
EFAVIRENZ ISLT GENOTYP: SIGNIFICANT CHANGE UP
EMTRICITABINE ISLT GENOTYP: SIGNIFICANT CHANGE UP
EOSINOPHIL # BLD AUTO: 0 K/UL — SIGNIFICANT CHANGE UP (ref 0–0.7)
EOSINOPHIL NFR BLD AUTO: 0 % — SIGNIFICANT CHANGE UP (ref 0–8)
ETRAVIRINE ISLT GENOTYP: SIGNIFICANT CHANGE UP
FOSAMPRENAVIR+RITONAVIR ISLT GENOTYP: SIGNIFICANT CHANGE UP
GLUCOSE SERPL-MCNC: 178 MG/DL — HIGH (ref 70–99)
HCT VFR BLD CALC: 39.6 % — SIGNIFICANT CHANGE UP (ref 37–47)
HGB BLD-MCNC: 13.7 G/DL — SIGNIFICANT CHANGE UP (ref 12–16)
HIV 2 PROVIRAL DNA SERPL QL NAA+PROBE: SIGNIFICANT CHANGE UP
HIV NNRTI GENE MUT DET ISLT: SIGNIFICANT CHANGE UP
HIV NRTI GENE MUT DET ISLT: SIGNIFICANT CHANGE UP
HIV PI GENE MUT DET ISLT: SIGNIFICANT CHANGE UP
HIV RT+PR MUT TESTED ISLT: SIGNIFICANT CHANGE UP
IMM GRANULOCYTES NFR BLD AUTO: 0.9 % — HIGH (ref 0.1–0.3)
INDINAVIR+RITONAVIR ISLT GENOTYP: SIGNIFICANT CHANGE UP
LAMIVUDINE ISLT GENOTYP: SIGNIFICANT CHANGE UP
LOPINAVIR+RITONAVIR ISLT GENOTYP: SIGNIFICANT CHANGE UP
LYMPHOCYTES # BLD AUTO: 0.67 K/UL — LOW (ref 1.2–3.4)
LYMPHOCYTES # BLD AUTO: 6.8 % — LOW (ref 20.5–51.1)
MCHC RBC-ENTMCNC: 31 PG — SIGNIFICANT CHANGE UP (ref 27–31)
MCHC RBC-ENTMCNC: 34.6 G/DL — SIGNIFICANT CHANGE UP (ref 32–37)
MCV RBC AUTO: 89.6 FL — SIGNIFICANT CHANGE UP (ref 81–99)
MONOCYTES # BLD AUTO: 0.45 K/UL — SIGNIFICANT CHANGE UP (ref 0.1–0.6)
MONOCYTES NFR BLD AUTO: 4.6 % — SIGNIFICANT CHANGE UP (ref 1.7–9.3)
NELFINAVIR ISLT GENOTYP: SIGNIFICANT CHANGE UP
NEUTROPHILS # BLD AUTO: 8.64 K/UL — HIGH (ref 1.4–6.5)
NEUTROPHILS NFR BLD AUTO: 87.6 % — HIGH (ref 42.2–75.2)
NEVIRAPINE ISLT GENOTYP: SIGNIFICANT CHANGE UP
NIGHT BLUE STAIN TISS: SIGNIFICANT CHANGE UP
NRBC # BLD: 0 /100 WBCS — SIGNIFICANT CHANGE UP (ref 0–0)
PLATELET # BLD AUTO: 492 K/UL — HIGH (ref 130–400)
POTASSIUM SERPL-MCNC: 4.6 MMOL/L — SIGNIFICANT CHANGE UP (ref 3.5–5)
POTASSIUM SERPL-SCNC: 4.6 MMOL/L — SIGNIFICANT CHANGE UP (ref 3.5–5)
PROT SERPL-MCNC: 8.2 G/DL — HIGH (ref 6–8)
RBC # BLD: 4.42 M/UL — SIGNIFICANT CHANGE UP (ref 4.2–5.4)
RBC # FLD: 13.1 % — SIGNIFICANT CHANGE UP (ref 11.5–14.5)
RILPIVIRINE ISLT GENOTYP: SIGNIFICANT CHANGE UP
SAQUINAVIR+RITONAVIR ISLT GENOTYP: SIGNIFICANT CHANGE UP
SODIUM SERPL-SCNC: 133 MMOL/L — LOW (ref 135–146)
SPECIMEN SOURCE: SIGNIFICANT CHANGE UP
STAVUDINE ISLT GENOTYP: SIGNIFICANT CHANGE UP
TENOFOVIR ISLT GENOTYP: SIGNIFICANT CHANGE UP
TIPRANAVIR+RITONAVIR ISLT GENOTYP: SIGNIFICANT CHANGE UP
WBC # BLD: 9.86 K/UL — SIGNIFICANT CHANGE UP (ref 4.8–10.8)
WBC # FLD AUTO: 9.86 K/UL — SIGNIFICANT CHANGE UP (ref 4.8–10.8)
ZIDOVUDINE ISLT GENOTYP: SIGNIFICANT CHANGE UP

## 2019-02-16 RX ADMIN — Medication 518.75 MILLIGRAM(S): at 18:15

## 2019-02-16 RX ADMIN — FLUCONAZOLE 200 MILLIGRAM(S): 150 TABLET ORAL at 12:52

## 2019-02-16 RX ADMIN — Medication 1 TABLET(S): at 12:52

## 2019-02-16 RX ADMIN — SODIUM CHLORIDE 3 MILLILITER(S): 9 INJECTION INTRAMUSCULAR; INTRAVENOUS; SUBCUTANEOUS at 06:16

## 2019-02-16 RX ADMIN — Medication 518.75 MILLIGRAM(S): at 12:52

## 2019-02-16 RX ADMIN — SODIUM CHLORIDE 3 MILLILITER(S): 9 INJECTION INTRAMUSCULAR; INTRAVENOUS; SUBCUTANEOUS at 13:09

## 2019-02-16 RX ADMIN — CHLORHEXIDINE GLUCONATE 1 APPLICATION(S): 213 SOLUTION TOPICAL at 05:14

## 2019-02-16 RX ADMIN — DOLUTEGRAVIR SODIUM 50 MILLIGRAM(S): 25 TABLET, FILM COATED ORAL at 12:52

## 2019-02-16 RX ADMIN — Medication 518.75 MILLIGRAM(S): at 00:02

## 2019-02-16 RX ADMIN — Medication 518.75 MILLIGRAM(S): at 23:43

## 2019-02-16 RX ADMIN — Medication 40 MILLIGRAM(S): at 18:15

## 2019-02-16 RX ADMIN — EMTRICITABINE AND TENOFOVIR DISOPROXIL FUMARATE 1 TABLET(S): 200; 300 TABLET, FILM COATED ORAL at 12:55

## 2019-02-16 RX ADMIN — SODIUM CHLORIDE 3 MILLILITER(S): 9 INJECTION INTRAMUSCULAR; INTRAVENOUS; SUBCUTANEOUS at 06:28

## 2019-02-16 RX ADMIN — SODIUM CHLORIDE 3 MILLILITER(S): 9 INJECTION INTRAMUSCULAR; INTRAVENOUS; SUBCUTANEOUS at 21:09

## 2019-02-16 RX ADMIN — Medication 518.75 MILLIGRAM(S): at 05:14

## 2019-02-16 RX ADMIN — Medication 40 MILLIGRAM(S): at 05:14

## 2019-02-16 NOTE — PROGRESS NOTE ADULT - SUBJECTIVE AND OBJECTIVE BOX
BARTONBELEN JUSTICE  38y, Female      OVERNIGHT EVENTS:    no fevers, no cough/ SOB/ chest pain/ phlegm  no odynophagia, minimal sore throat    VITALS:  T(F): 98.3, Max: 98.3 (02-16-19 @ 04:50)  HR: 65  BP: 115/60  RR: 18Vital Signs Last 24 Hrs  T(C): 36.8 (16 Feb 2019 04:50), Max: 36.8 (16 Feb 2019 04:50)  T(F): 98.3 (16 Feb 2019 04:50), Max: 98.3 (16 Feb 2019 04:50)  HR: 65 (16 Feb 2019 04:50) (63 - 65)  BP: 115/60 (16 Feb 2019 04:50) (100/64 - 115/60)  BP(mean): --  RR: 18 (16 Feb 2019 04:50) (18 - 18)  SpO2: --    TESTS & MEASUREMENTS:                        12.6   6.73  )-----------( 392      ( 15 Feb 2019 07:19 )             38.5     02-15    134<L>  |  97<L>  |  10  ----------------------------<  270<H>  4.7   |  19  |  0.7    Ca    8.6      15 Feb 2019 07:19    TPro  7.4  /  Alb  2.9<L>  /  TBili  0.2  /  DBili  x   /  AST  47<H>  /  ALT  28  /  AlkPhos  117<H>  02-15    LIVER FUNCTIONS - ( 15 Feb 2019 07:19 )  Alb: 2.9 g/dL / Pro: 7.4 g/dL / ALK PHOS: 117 U/L / ALT: 28 U/L / AST: 47 U/L / GGT: x             Culture - Blood (collected 02-11-19 @ 17:39)  Source: .Blood Blood  Preliminary Report (02-12-19 @ 23:01):    No growth to date.    Culture - Blood (collected 02-11-19 @ 17:39)  Source: .Blood Blood  Preliminary Report (02-12-19 @ 23:01):    No growth to date.            RADIOLOGY & ADDITIONAL TESTS:    ANTIBIOTICS:  dolutegravir 50 milliGRAM(s) Oral daily  emtricitabine 200 mG/tenofovir alafenamide 25 mG (DESCOVY) Tablet 1 Tablet(s) Oral daily  fluconAZOLE   Tablet 200 milliGRAM(s) Oral daily  trimethoprim / sulfamethoxazole IVPB 300 milliGRAM(s) IV Intermittent four times a day

## 2019-02-16 NOTE — PROGRESS NOTE ADULT - ASSESSMENT
BELEN BARTON 38y Female  MRN#: [id number]   CODE STATUS: Full code      SUBJECTIVE    Patient is a 38y old Female who presents with a chief complaint of dyspnea and weight loss.  Currently admitted to medicine with the primary diagnoses of AIDS, thrush, and pneumonia.    Interval History: Today is hospital day 5d. Overnight there were no events. Today she is doing better, breathing and throat pain are improving.    HPI:   HPI:  38 yof with no significant pmh p/w cc of sore throat, 40 lb weight loss, decrease appetite since 1 month assoc with fever and productive cough since 1 week ptp;  - pt has been having dysphagia, sore throat since 1 month which is progressively worsening  - pt reports loosing 40 lb weight in 1 month period associated with decrease appetite;  - Fever assoc with productive cough with greenish white sputum started 1 week ago and has been progressively worsening; pt feels very weak since 1 week  - Pt currently has unprotected sex with one partner but reports to have many sexual partners in the past; [place] (11 Feb 2019 18:36)    Hospital Course:       PAST MEDICAL & SURGICAL HISTORY  No pertinent past medical history      ALLERGIES:  No Known Allergies      MEDICATIONS:  STANDING MEDICATIONS  chlorhexidine 4% Liquid 1 Application(s) Topical <User Schedule>  dolutegravir 50 milliGRAM(s) Oral daily  emtricitabine 200 mG/tenofovir alafenamide 25 mG (DESCOVY) Tablet 1 Tablet(s) Oral daily  fluconAZOLE   Tablet 200 milliGRAM(s) Oral daily  multivitamin 1 Tablet(s) Oral daily  predniSONE   Tablet 40 milliGRAM(s) Oral two times a day  sodium chloride 0.9% lock flush 3 milliLiter(s) IV Push every 8 hours  trimethoprim / sulfamethoxazole IVPB 300 milliGRAM(s) IV Intermittent four times a day    PRN MEDICATIONS  acetaminophen   Tablet .. 650 milliGRAM(s) Oral every 6 hours PRN        OBJECTIVE    VITAL SIGNS: Last 24 Hours  T(C): 36.8 (16 Feb 2019 04:50), Max: 36.8 (16 Feb 2019 04:50)  T(F): 98.3 (16 Feb 2019 04:50), Max: 98.3 (16 Feb 2019 04:50)  HR: 65 (16 Feb 2019 04:50) (63 - 65)  BP: 115/60 (16 Feb 2019 04:50) (100/64 - 115/60)  BP(mean): --  RR: 18 (16 Feb 2019 04:50) (18 - 18)  SpO2: --      PHYSICAL EXAM:    GENERAL: No acute distress. On nasal cannula.  HEENT:  Atraumatic, Normocephalic. EOMI, PERRLA.  PULMONARY: Scattered crackles.  CARDIOVASCULAR: Regular rate and rhythm; No murmurs, rubs, or gallops.  MUSCULOSKELETAL: No edema.      LABS:                        13.7   9.86  )-----------( 492      ( 16 Feb 2019 04:30 )             39.6     02-16    133<L>  |  102  |  12  ----------------------------<  178<H>  4.6   |  16<L>  |  0.7    Ca    9.5      16 Feb 2019 04:30    TPro  8.2<H>  /  Alb  3.3<L>  /  TBili  0.2  /  DBili  x   /  AST  45<H>  /  ALT  42<H>  /  AlkPhos  129<H>  02-16      RADIOLOGY:        ASSESSMENT AND PLAN    39 yo with no significant pmh p/w cc of sore throat, 40 lb weight loss, decrease appetite since 1 month assoc with fever and productive cough since 1 week ptp.    AIDS  - CD4 76  - C/w ART  - F/u viral load  - pt advised to inform partners for HIV testing    Pneumonia  - Likely PCP; rule out TB  - BCx- to date  - LDH elevated  - Quantiferon equivocal  - CT findings consistent with PCP; no granulomas or cavitation  - F/u 3x sputum induction  - C/w bactrim 300 mg iv q6h  - C/w prednisone 40 mg QD x5 d, de-escalate per ID note on 18th  - Monitor WBC's, fever curve    Dysphagia 2/2 severe Oral thrush/ weight loss/ Decrease appetite 2/2 HIV  - C/w fluconazole    Dvt ppx  - Lovenox SQ      Dispo: Home likely Monday

## 2019-02-17 LAB
ALBUMIN SERPL ELPH-MCNC: 3.4 G/DL — LOW (ref 3.5–5.2)
ALP SERPL-CCNC: 129 U/L — HIGH (ref 30–115)
ALT FLD-CCNC: 54 U/L — HIGH (ref 0–41)
ANION GAP SERPL CALC-SCNC: 13 MMOL/L — SIGNIFICANT CHANGE UP (ref 7–14)
AST SERPL-CCNC: 47 U/L — HIGH (ref 0–41)
BASOPHILS # BLD AUTO: 0.01 K/UL — SIGNIFICANT CHANGE UP (ref 0–0.2)
BASOPHILS NFR BLD AUTO: 0.1 % — SIGNIFICANT CHANGE UP (ref 0–1)
BILIRUB SERPL-MCNC: 0.2 MG/DL — SIGNIFICANT CHANGE UP (ref 0.2–1.2)
BUN SERPL-MCNC: 14 MG/DL — SIGNIFICANT CHANGE UP (ref 10–20)
CALCIUM SERPL-MCNC: 9.8 MG/DL — SIGNIFICANT CHANGE UP (ref 8.5–10.1)
CHLORIDE SERPL-SCNC: 99 MMOL/L — SIGNIFICANT CHANGE UP (ref 98–110)
CO2 SERPL-SCNC: 19 MMOL/L — SIGNIFICANT CHANGE UP (ref 17–32)
CREAT SERPL-MCNC: 0.8 MG/DL — SIGNIFICANT CHANGE UP (ref 0.7–1.5)
EOSINOPHIL # BLD AUTO: 0 K/UL — SIGNIFICANT CHANGE UP (ref 0–0.7)
EOSINOPHIL NFR BLD AUTO: 0 % — SIGNIFICANT CHANGE UP (ref 0–8)
GLUCOSE SERPL-MCNC: 154 MG/DL — HIGH (ref 70–99)
HCT VFR BLD CALC: 41.1 % — SIGNIFICANT CHANGE UP (ref 37–47)
HGB BLD-MCNC: 14 G/DL — SIGNIFICANT CHANGE UP (ref 12–16)
IMM GRANULOCYTES NFR BLD AUTO: 1.1 % — HIGH (ref 0.1–0.3)
LYMPHOCYTES # BLD AUTO: 0.82 K/UL — LOW (ref 1.2–3.4)
LYMPHOCYTES # BLD AUTO: 6.7 % — LOW (ref 20.5–51.1)
MCHC RBC-ENTMCNC: 30.6 PG — SIGNIFICANT CHANGE UP (ref 27–31)
MCHC RBC-ENTMCNC: 34.1 G/DL — SIGNIFICANT CHANGE UP (ref 32–37)
MCV RBC AUTO: 89.7 FL — SIGNIFICANT CHANGE UP (ref 81–99)
MONOCYTES # BLD AUTO: 0.55 K/UL — SIGNIFICANT CHANGE UP (ref 0.1–0.6)
MONOCYTES NFR BLD AUTO: 4.5 % — SIGNIFICANT CHANGE UP (ref 1.7–9.3)
NEUTROPHILS # BLD AUTO: 10.69 K/UL — HIGH (ref 1.4–6.5)
NEUTROPHILS NFR BLD AUTO: 87.6 % — HIGH (ref 42.2–75.2)
NIGHT BLUE STAIN TISS: SIGNIFICANT CHANGE UP
NRBC # BLD: 0 /100 WBCS — SIGNIFICANT CHANGE UP (ref 0–0)
PLATELET # BLD AUTO: 571 K/UL — HIGH (ref 130–400)
POTASSIUM SERPL-MCNC: 5.2 MMOL/L — HIGH (ref 3.5–5)
POTASSIUM SERPL-SCNC: 5.2 MMOL/L — HIGH (ref 3.5–5)
PROT SERPL-MCNC: 8.4 G/DL — HIGH (ref 6–8)
RBC # BLD: 4.58 M/UL — SIGNIFICANT CHANGE UP (ref 4.2–5.4)
RBC # FLD: 13 % — SIGNIFICANT CHANGE UP (ref 11.5–14.5)
SODIUM SERPL-SCNC: 131 MMOL/L — LOW (ref 135–146)
SPECIMEN SOURCE: SIGNIFICANT CHANGE UP
WBC # BLD: 12.21 K/UL — HIGH (ref 4.8–10.8)
WBC # FLD AUTO: 12.21 K/UL — HIGH (ref 4.8–10.8)

## 2019-02-17 RX ORDER — SODIUM CHLORIDE 9 MG/ML
4 INJECTION INTRAMUSCULAR; INTRAVENOUS; SUBCUTANEOUS ONCE
Qty: 0 | Refills: 0 | Status: COMPLETED | OUTPATIENT
Start: 2019-02-17 | End: 2019-02-17

## 2019-02-17 RX ORDER — AZITHROMYCIN 500 MG/1
1200 TABLET, FILM COATED ORAL
Qty: 0 | Refills: 0 | Status: DISCONTINUED | OUTPATIENT
Start: 2019-02-18 | End: 2019-02-19

## 2019-02-17 RX ADMIN — FLUCONAZOLE 200 MILLIGRAM(S): 150 TABLET ORAL at 11:24

## 2019-02-17 RX ADMIN — DOLUTEGRAVIR SODIUM 50 MILLIGRAM(S): 25 TABLET, FILM COATED ORAL at 11:24

## 2019-02-17 RX ADMIN — Medication 518.75 MILLIGRAM(S): at 17:09

## 2019-02-17 RX ADMIN — SODIUM CHLORIDE 4 MILLILITER(S): 9 INJECTION INTRAMUSCULAR; INTRAVENOUS; SUBCUTANEOUS at 07:31

## 2019-02-17 RX ADMIN — SODIUM CHLORIDE 3 MILLILITER(S): 9 INJECTION INTRAMUSCULAR; INTRAVENOUS; SUBCUTANEOUS at 13:58

## 2019-02-17 RX ADMIN — SODIUM CHLORIDE 3 MILLILITER(S): 9 INJECTION INTRAMUSCULAR; INTRAVENOUS; SUBCUTANEOUS at 05:23

## 2019-02-17 RX ADMIN — EMTRICITABINE AND TENOFOVIR DISOPROXIL FUMARATE 1 TABLET(S): 200; 300 TABLET, FILM COATED ORAL at 11:24

## 2019-02-17 RX ADMIN — SODIUM CHLORIDE 3 MILLILITER(S): 9 INJECTION INTRAMUSCULAR; INTRAVENOUS; SUBCUTANEOUS at 21:00

## 2019-02-17 RX ADMIN — Medication 40 MILLIGRAM(S): at 05:23

## 2019-02-17 RX ADMIN — Medication 518.75 MILLIGRAM(S): at 11:24

## 2019-02-17 RX ADMIN — Medication 40 MILLIGRAM(S): at 17:11

## 2019-02-17 RX ADMIN — Medication 518.75 MILLIGRAM(S): at 05:22

## 2019-02-17 RX ADMIN — Medication 1 TABLET(S): at 11:24

## 2019-02-17 NOTE — PROGRESS NOTE ADULT - SUBJECTIVE AND OBJECTIVE BOX
BARTONBELEN JUSTICE  38y, Female      OVERNIGHT EVENTS:    none    VITALS:  T(F): 96.3, Max: 96.3 (02-17-19 @ 04:56)  HR: 75  BP: 108/61  RR: 18Vital Signs Last 24 Hrs  T(C): 35.7 (17 Feb 2019 04:56), Max: 35.7 (17 Feb 2019 04:56)  T(F): 96.3 (17 Feb 2019 04:56), Max: 96.3 (17 Feb 2019 04:56)  HR: 75 (17 Feb 2019 04:56) (75 - 75)  BP: 108/61 (17 Feb 2019 04:56) (105/65 - 108/61)  BP(mean): --  RR: 18 (17 Feb 2019 04:56) (18 - 18)  SpO2: --    TESTS & MEASUREMENTS:                        14.0   12.21 )-----------( 571      ( 17 Feb 2019 07:47 )             41.1     02-17    131<L>  |  99  |  14  ----------------------------<  154<H>  5.2<H>   |  19  |  0.8    Ca    9.8      17 Feb 2019 07:47    TPro  8.4<H>  /  Alb  3.4<L>  /  TBili  0.2  /  DBili  x   /  AST  47<H>  /  ALT  54<H>  /  AlkPhos  129<H>  02-17    LIVER FUNCTIONS - ( 17 Feb 2019 07:47 )  Alb: 3.4 g/dL / Pro: 8.4 g/dL / ALK PHOS: 129 U/L / ALT: 54 U/L / AST: 47 U/L / GGT: x             Culture - Acid Fast - Sputum w/Smear (collected 02-15-19 @ 04:30)  Source: .Sputum Sputum    Culture - Blood (collected 02-11-19 @ 17:39)  Source: .Blood Blood  Final Report (02-16-19 @ 23:01):    No growth at 5 days.    Culture - Blood (collected 02-11-19 @ 17:39)  Source: .Blood Blood  Final Report (02-16-19 @ 23:01):    No growth at 5 days.            RADIOLOGY & ADDITIONAL TESTS:    ANTIBIOTICS:  dolutegravir 50 milliGRAM(s) Oral daily  emtricitabine 200 mG/tenofovir alafenamide 25 mG (DESCOVY) Tablet 1 Tablet(s) Oral daily  fluconAZOLE   Tablet 200 milliGRAM(s) Oral daily  trimethoprim / sulfamethoxazole IVPB 300 milliGRAM(s) IV Intermittent four times a day

## 2019-02-17 NOTE — PROGRESS NOTE ADULT - ASSESSMENT
43F from [place]    Admitted with SOB, odynophagia, exam with candida with presumed esophagitis and likely PCP PNA given CXR findings and elevated LDH  New diagnosis of Symptomatic AIDS CD4 70; 5%  CT Chest Extensive upper lobe predominant ground glass opacities bilaterally, in keeping with history of PCP. No cavitary lesions or evidence of granulomatous disease.  Indeterminate quantiferon gold (low suspicion for active TB)  Toxo IgG neg, CMV IgG +, RPR neg  CD4 61    - Continue IV bactrim 300mg q6h for PCP PNA  - Prednisone 40mg BID x5 days, 40mg daily x5 days, 20mg daily x11 days  - change to PO fluc 200mg q24h (monitor LFTs) for esophageal candidiasis (14 days end 2/24)  - ART: Descovy + Dolutegravir 50mg daily (on d/c can change to biktarvy if emblem covers)  - AFB x3 and airborne, lower suspicion for TB, findings consistent with PCP   - HIV VL, fungitell to confirm PCP testing  -po Azithomycin 1200 mg x once /week

## 2019-02-18 LAB
ALBUMIN SERPL ELPH-MCNC: 3.7 G/DL — SIGNIFICANT CHANGE UP (ref 3.5–5.2)
ALP SERPL-CCNC: 134 U/L — HIGH (ref 30–115)
ALT FLD-CCNC: 60 U/L — HIGH (ref 0–41)
ANION GAP SERPL CALC-SCNC: 14 MMOL/L — SIGNIFICANT CHANGE UP (ref 7–14)
AST SERPL-CCNC: 41 U/L — SIGNIFICANT CHANGE UP (ref 0–41)
BASOPHILS # BLD AUTO: 0.03 K/UL — SIGNIFICANT CHANGE UP (ref 0–0.2)
BASOPHILS NFR BLD AUTO: 0.2 % — SIGNIFICANT CHANGE UP (ref 0–1)
BILIRUB SERPL-MCNC: 0.3 MG/DL — SIGNIFICANT CHANGE UP (ref 0.2–1.2)
BUN SERPL-MCNC: 15 MG/DL — SIGNIFICANT CHANGE UP (ref 10–20)
CALCIUM SERPL-MCNC: 10 MG/DL — SIGNIFICANT CHANGE UP (ref 8.5–10.1)
CHLORIDE SERPL-SCNC: 95 MMOL/L — LOW (ref 98–110)
CO2 SERPL-SCNC: 19 MMOL/L — SIGNIFICANT CHANGE UP (ref 17–32)
CREAT SERPL-MCNC: 0.9 MG/DL — SIGNIFICANT CHANGE UP (ref 0.7–1.5)
EOSINOPHIL # BLD AUTO: 0 K/UL — SIGNIFICANT CHANGE UP (ref 0–0.7)
EOSINOPHIL NFR BLD AUTO: 0 % — SIGNIFICANT CHANGE UP (ref 0–8)
GLUCOSE SERPL-MCNC: 165 MG/DL — HIGH (ref 70–99)
HCT VFR BLD CALC: 44.8 % — SIGNIFICANT CHANGE UP (ref 37–47)
HGB BLD-MCNC: 15.4 G/DL — SIGNIFICANT CHANGE UP (ref 12–16)
IMM GRANULOCYTES NFR BLD AUTO: 1 % — HIGH (ref 0.1–0.3)
LYMPHOCYTES # BLD AUTO: 0.92 K/UL — LOW (ref 1.2–3.4)
LYMPHOCYTES # BLD AUTO: 7.3 % — LOW (ref 20.5–51.1)
MCHC RBC-ENTMCNC: 31 PG — SIGNIFICANT CHANGE UP (ref 27–31)
MCHC RBC-ENTMCNC: 34.4 G/DL — SIGNIFICANT CHANGE UP (ref 32–37)
MCV RBC AUTO: 90.1 FL — SIGNIFICANT CHANGE UP (ref 81–99)
MONOCYTES # BLD AUTO: 0.88 K/UL — HIGH (ref 0.1–0.6)
MONOCYTES NFR BLD AUTO: 6.9 % — SIGNIFICANT CHANGE UP (ref 1.7–9.3)
NEUTROPHILS # BLD AUTO: 10.71 K/UL — HIGH (ref 1.4–6.5)
NEUTROPHILS NFR BLD AUTO: 84.6 % — HIGH (ref 42.2–75.2)
NIGHT BLUE STAIN TISS: SIGNIFICANT CHANGE UP
NRBC # BLD: 0 /100 WBCS — SIGNIFICANT CHANGE UP (ref 0–0)
PLATELET # BLD AUTO: 608 K/UL — HIGH (ref 130–400)
POTASSIUM SERPL-MCNC: 5.6 MMOL/L — HIGH (ref 3.5–5)
POTASSIUM SERPL-SCNC: 5.6 MMOL/L — HIGH (ref 3.5–5)
PROT SERPL-MCNC: 9.1 G/DL — HIGH (ref 6–8)
RBC # BLD: 4.97 M/UL — SIGNIFICANT CHANGE UP (ref 4.2–5.4)
RBC # FLD: 13 % — SIGNIFICANT CHANGE UP (ref 11.5–14.5)
SODIUM SERPL-SCNC: 128 MMOL/L — LOW (ref 135–146)
SPECIMEN SOURCE: SIGNIFICANT CHANGE UP
WBC # BLD: 12.67 K/UL — HIGH (ref 4.8–10.8)
WBC # FLD AUTO: 12.67 K/UL — HIGH (ref 4.8–10.8)

## 2019-02-18 RX ORDER — DOCUSATE SODIUM 100 MG
100 CAPSULE ORAL
Qty: 0 | Refills: 0 | Status: DISCONTINUED | OUTPATIENT
Start: 2019-02-18 | End: 2019-02-21

## 2019-02-18 RX ORDER — SENNA PLUS 8.6 MG/1
2 TABLET ORAL AT BEDTIME
Qty: 0 | Refills: 0 | Status: DISCONTINUED | OUTPATIENT
Start: 2019-02-18 | End: 2019-02-21

## 2019-02-18 RX ADMIN — Medication 518.75 MILLIGRAM(S): at 00:16

## 2019-02-18 RX ADMIN — Medication 518.75 MILLIGRAM(S): at 23:58

## 2019-02-18 RX ADMIN — SODIUM CHLORIDE 3 MILLILITER(S): 9 INJECTION INTRAMUSCULAR; INTRAVENOUS; SUBCUTANEOUS at 13:20

## 2019-02-18 RX ADMIN — Medication 40 MILLIGRAM(S): at 05:22

## 2019-02-18 RX ADMIN — Medication 518.75 MILLIGRAM(S): at 05:22

## 2019-02-18 RX ADMIN — Medication 518.75 MILLIGRAM(S): at 17:53

## 2019-02-18 RX ADMIN — SENNA PLUS 2 TABLET(S): 8.6 TABLET ORAL at 21:47

## 2019-02-18 RX ADMIN — AZITHROMYCIN 1200 MILLIGRAM(S): 500 TABLET, FILM COATED ORAL at 10:47

## 2019-02-18 RX ADMIN — Medication 100 MILLIGRAM(S): at 21:47

## 2019-02-18 RX ADMIN — EMTRICITABINE AND TENOFOVIR DISOPROXIL FUMARATE 1 TABLET(S): 200; 300 TABLET, FILM COATED ORAL at 13:14

## 2019-02-18 RX ADMIN — FLUCONAZOLE 200 MILLIGRAM(S): 150 TABLET ORAL at 13:14

## 2019-02-18 RX ADMIN — DOLUTEGRAVIR SODIUM 50 MILLIGRAM(S): 25 TABLET, FILM COATED ORAL at 13:14

## 2019-02-18 RX ADMIN — Medication 518.75 MILLIGRAM(S): at 13:15

## 2019-02-18 RX ADMIN — Medication 1 TABLET(S): at 13:14

## 2019-02-18 RX ADMIN — SODIUM CHLORIDE 3 MILLILITER(S): 9 INJECTION INTRAMUSCULAR; INTRAVENOUS; SUBCUTANEOUS at 21:48

## 2019-02-18 RX ADMIN — SODIUM CHLORIDE 3 MILLILITER(S): 9 INJECTION INTRAMUSCULAR; INTRAVENOUS; SUBCUTANEOUS at 05:21

## 2019-02-18 NOTE — PROGRESS NOTE ADULT - SUBJECTIVE AND OBJECTIVE BOX
infectious diseases progress note:  BELEN BARTON is a 38yFemale patient    PNEUMONIA OF BOTH LOWER LOBES ORAL CANDIDIASIS        ROS:  CONSTITUTIONAL:  Negative fever or chills, feels well, good appetite  EYES:  Negative  blurry vision or double vision  CARDIOVASCULAR:  Negative for chest pain or palpitations  RESPIRATORY:  Negative for cough, wheezing, or SOB   GASTROINTESTINAL:  Negative for nausea, vomiting, diarrhea, constipation, or abdominal pain  GENITOURINARY:  Negative frequency, urgency or dysuria  NEUROLOGIC:  No headache, confusion, dizziness, lightheadedness    Allergies    No Known Allergies    Intolerances        ANTIBIOTICS/RELEVANT:  antimicrobials  azithromycin   Tablet 1200 milliGRAM(s) Oral <User Schedule>  dolutegravir 50 milliGRAM(s) Oral daily  emtricitabine 200 mG/tenofovir alafenamide 25 mG (DESCOVY) Tablet 1 Tablet(s) Oral daily  fluconAZOLE   Tablet 200 milliGRAM(s) Oral daily  trimethoprim / sulfamethoxazole IVPB 300 milliGRAM(s) IV Intermittent four times a day    immunologic:    OTHER:  acetaminophen   Tablet .. 650 milliGRAM(s) Oral every 6 hours PRN  chlorhexidine 4% Liquid 1 Application(s) Topical <User Schedule>  multivitamin 1 Tablet(s) Oral daily  predniSONE   Tablet 40 milliGRAM(s) Oral daily  sodium chloride 0.9% lock flush 3 milliLiter(s) IV Push every 8 hours      Objective:  T(F): 96.9 (02-18-19 @ 12:30), Max: 97.7 (02-17-19 @ 21:17)  HR: 95 (02-18-19 @ 12:30) (76 - 95)  BP: 114/66 (02-18-19 @ 12:30) (104/65 - 114/66)  RR: 18 (02-18-19 @ 12:30) (18 - 18)  SpO2: 95% (02-17-19 @ 19:36) (95% - 95%)    PHYSICAL EXAM  Eyes:CARYN, EOMI  Ear/Nose/Throat: no oral lesion, no sinus tenderness on percussion	  Neck:no JVD, no lymphadenopathy, supple  Respiratory: CTA flex  Cardiovascular: S1S2 RRR, no murmurs  Gastrointestinal:soft, (+) BS, no HSM  Extremities:no e/e/c    02-18    128<L>  |  95<L>  |  15  ----------------------------<  165<H>  5.6<H>   |  19  |  0.9      TPro  9.1<H>  /  Alb  3.7  /  TBili  0.3  /  DBili  x   /  AST  41  /  ALT  60<H>  /  AlkPhos  134<H>  02-18                            15.4   12.67 )-----------( 608      ( 18 Feb 2019 07:39 )             44.8           Culture - Acid Fast - Sputum w/Smear (collected 17 Feb 2019 07:27)  Source: .Sputum Sputum    Culture - Acid Fast - Sputum w/Smear (collected 16 Feb 2019 04:30)  Source: .Sputum Sputum    Culture - Acid Fast - Sputum w/Smear (collected 15 Feb 2019 04:30)  Source: .Sputum Sputum    Culture - Blood (collected 11 Feb 2019 17:39)  Source: .Blood Blood  Final Report (16 Feb 2019 23:01):    No growth at 5 days.    Culture - Blood (collected 11 Feb 2019 17:39)  Source: .Blood Blood  Final Report (16 Feb 2019 23:01):    No growth at 5 days.

## 2019-02-18 NOTE — PROGRESS NOTE ADULT - SUBJECTIVE AND OBJECTIVE BOX
BELEN BARTON 38y Female  MRN#: [id number]       SUBJECTIVE    38 year old female with no significant past medical history presented with complains of flu like illness , sore throat and weight loss 40 lb, decreased appetite for past 1 month associated with fever and productive cough. The cough was productive but no blood in it. Patient also reported to have multiple sexual partners and having unprotected sex, currently being managed for pneumonia and HIV.  She does not report any overnight events. No fever, chills or SOB. No chest pain. The patient says that her throat pain has improved since the day of admission and cough has also improved.    OBJECTIVE  PAST MEDICAL & SURGICAL HISTORY  No pertinent past medical history    ALLERGIES:  No Known Allergies    MEDICATIONS:  STANDING MEDICATIONS  azithromycin   Tablet 1200 milliGRAM(s) Oral <User Schedule>  chlorhexidine 4% Liquid 1 Application(s) Topical <User Schedule>  dolutegravir 50 milliGRAM(s) Oral daily  emtricitabine 200 mG/tenofovir alafenamide 25 mG (DESCOVY) Tablet 1 Tablet(s) Oral daily  fluconAZOLE   Tablet 200 milliGRAM(s) Oral daily  multivitamin 1 Tablet(s) Oral daily  predniSONE   Tablet 40 milliGRAM(s) Oral daily  sodium chloride 0.9% lock flush 3 milliLiter(s) IV Push every 8 hours  trimethoprim / sulfamethoxazole IVPB 300 milliGRAM(s) IV Intermittent four times a day    PRN MEDICATIONS  acetaminophen   Tablet .. 650 milliGRAM(s) Oral every 6 hours PRN      VITAL SIGNS: Last 24 Hours  T(C): 36.1 (18 Feb 2019 12:30), Max: 36.5 (17 Feb 2019 21:17)  T(F): 96.9 (18 Feb 2019 12:30), Max: 97.7 (17 Feb 2019 21:17)  HR: 95 (18 Feb 2019 12:30) (76 - 95)  BP: 114/66 (18 Feb 2019 12:30) (104/65 - 114/66)  BP(mean): --  RR: 18 (18 Feb 2019 12:30) (18 - 18)  SpO2: 95% (17 Feb 2019 19:36) (95% - 95%)    LABS:                        15.4   12.67 )-----------( 608      ( 18 Feb 2019 07:39 )             44.8     02-18    128<L>  |  95<L>  |  15  ----------------------------<  165<H>  5.6<H>   |  19  |  0.9    Ca    10.0      18 Feb 2019 07:39    TPro  9.1<H>  /  Alb  3.7  /  TBili  0.3  /  DBili  x   /  AST  41  /  ALT  60<H>  /  AlkPhos  134<H>  02-18      Culture - Acid Fast - Sputum w/Smear (collected 17 Feb 2019 07:27)  Source: .Sputum Sputum    Culture - Acid Fast - Sputum w/Smear (collected 16 Feb 2019 04:30)  Source: .Sputum Sputum          RADIOLOGY:  < from: CT Chest No Cont (02.13.19 @ 19:57) >  IMPRESSION:    Extensive upper lobe predominant ground glass opacities bilaterally, in   keeping with history of PCP.    No cavitary lesions or evidence of granulomatous disease.    < end of copied text >    < from: Xray Chest 2 Views PA/Lat (02.11.19 @ 17:27) >  Impression:      Scattered bilateral airspace opacities, right greater than left,   suspicious for pneumonia in the appropriate clinical setting.     < end of copied text >      PHYSICAL EXAM:    GENERAL: NAD, AAOx3  HEENT:  Atraumatic, Normocephalic.   PULMONARY: Base crackles heard bilaterally.   CARDIOVASCULAR: Regular rate and rhythm; No murmurs.  GASTROINTESTINAL: Soft, Nontender  MUSCULOSKELETAL:  2+ Peripheral Pulses, No edema    ADMISSION SUMMARY  Patient is a 38y old Female who presents with a chief complaint of sore throat and cough, diagnosed and being treated for acquired immunodeficiency syndrome.      ASSESSMENT & PLAN    # HIV and AIDS  - CD4 70 CD8 952 CD4/CD8 Ratio 0.1  - Patient started on emtricitabine/tenofovir (Descovy) and dolutegravir daily.(on d/c can change to biktarvy if emblem covers)  - viral load [id number]  - pt advised to inform partners for HIV testing.  - CT chest concerning for PCP pneumonia  - LDH elevated  - Quantiferon equivocal  - 3 sets of sputum culture negative.   - Continue with bactrim 300 mg iv q6h  - Prednisone switched to 40 mg PO daily as per ID.   - Prednisone 40mg BID x5 days, 40mg daily x5 days, 20mg daily x11 days  - On PO fluconazole 200mg q 24h (monitor LFTs) for esophageal candidiasis (14 days end 2/24)  -Fungitell Positive. Hepatitis A, B and C negative. Recommend hepatitis B vaccine before discharge?   -po Azithomycin 1200 mg x once /week prophylactic for MAC      # DVT prophylaxis  -On lovenox

## 2019-02-18 NOTE — CHART NOTE - NSCHARTNOTEFT_GEN_A_CORE
Registered Dietitian Follow-Up     Patient Profile Reviewed                           Yes [x]   No []     Nutrition History Previously Obtained        Yes [x]  No []       Pertinent Subjective Information: Pt continues w/ inadequate oral intake, however, PO intake improving; now 30-50% intake of meal trays. PO supplements not ordered- will reassess pt PO intake upon f/u and initiation of supplements.     Pertinent Medical Interventions: Pt is newly-diagnosed AIDS pt w/ PNA and oral candidiasis.      Diet order: Regular     Anthropometrics:  - Ht. 66"  - Wt. 182# stated per pt. No updated wts  - %wt change  - BMI  29.4  - IBW 59kg+/-10%     Pertinent Lab Data: (2/18) Na 128, K 5.6, Cl 95, s gluc 165, alk phos 134, ALT 60     Pertinent Meds: zithromax, diflucan, bactrim, prednisone, MVI,     Physical Findings:  - Appearance: overwt, mild temple wasting  - GI function: last BM 2/15, no GI signs and symptoms  - Tubes:  - Oral/Mouth cavity: no changes in chew/swallow ability  - Skin: BS 20, skin intact     Nutrition Requirements  Weight Used:  stated 182# + #      Estimated Energy Needs    Continue [x]  Adjust []  Adjusted Energy Recommendations:   kcal/day   1815-1970kcal (MSJx1.2-1.3AF) PCM, AIDS, and overwt BMI considered     Estimated Protein Needs    Continue [x]  Adjust []  Adjusted Protein 71-89g (1.2-1.5g/kgIBW) pt w/ PCM and AIDSRecommendations:   gm/day        Estimated Fluid Needs        Continue [x]  Adjust []  Adjusted Fluid Recommendations:   mL/day   per LIP    Nutrient Intake: not meeting needs, however, improvement        [] Previous Nutrition Diagnosis: severe PCM            [x] Ongoing          [] Resolved    [] No active nutrition diagnosis identified at this time     Nutrition Intervention meals and snacks, med food supplements   1. Continue w/ regular diet/no modifiers. 2. Consider Ensure clears QID and Ensure Enlive QID.     Goal/Expected Outcome: Pt to consume >75% of meal trays within 3 days     Indicator/Monitoring: RD to monitor diet order, energy intake, NFPF, body comp, glucose and renal profile    Pt at risk f/u 3 days

## 2019-02-18 NOTE — PROGRESS NOTE ADULT - ASSESSMENT
43F from [place]    Admitted with SOB, odynophagia, exam with candida with presumed esophagitis and likely PCP PNA given CXR findings and elevated LDH  New diagnosis of Symptomatic AIDS CD4 70; 5%  CT Chest Extensive upper lobe predominant ground glass opacities bilaterally, in keeping with history of PCP. No cavitary lesions or evidence of granulomatous disease.  Indeterminate quantiferon gold (low suspicion for active TB)  Toxo IgG neg, CMV IgG +, RPR neg  CD4 61    On azithromycin   Tablet 1200 milliGRAM(s) Oral <User Schedule>  dolutegravir 50 milliGRAM(s) Oral daily  emtricitabine 200 mG/tenofovir alafenamide 25 mG (DESCOVY) Tablet 1 Tablet(s) Oral daily  fluconAZOLE   Tablet 200 milliGRAM(s) Oral daily  trimethoprim / sulfamethoxazole IVPB 300 milliGRAM(s) IV Intermittent four times a day  Prednisone 40mg PO OD    - Continue IV bactrim 300mg q6h for PCP PNA  - Prednisone 40mg BID x5 days, 40mg daily x5 days, 20mg daily x11 days  - change to PO fluc 200mg q24h (monitor LFTs) for esophageal candidiasis (14 days end 2/24)  - ART: Descovy + Dolutegravir 50mg daily (on d/c can change to biktarvy if emblem covers)  - AFB x3 and airborne, lower suspicion for TB, findings consistent with PCP   - HIV VL, fungitell to confirm PCP testing  -po Azithomycin 1200 mg x once /week

## 2019-02-19 ENCOUNTER — TRANSCRIPTION ENCOUNTER (OUTPATIENT)
Age: 39
End: 2019-02-19

## 2019-02-19 LAB
ALBUMIN SERPL ELPH-MCNC: 3.7 G/DL — SIGNIFICANT CHANGE UP (ref 3.5–5.2)
ALP SERPL-CCNC: 132 U/L — HIGH (ref 30–115)
ALT FLD-CCNC: 63 U/L — HIGH (ref 0–41)
ANION GAP SERPL CALC-SCNC: 12 MMOL/L — SIGNIFICANT CHANGE UP (ref 7–14)
ANION GAP SERPL CALC-SCNC: 14 MMOL/L — SIGNIFICANT CHANGE UP (ref 7–14)
ANION GAP SERPL CALC-SCNC: 20 MMOL/L — HIGH (ref 7–14)
AST SERPL-CCNC: 45 U/L — HIGH (ref 0–41)
BASOPHILS # BLD AUTO: 0.06 K/UL — SIGNIFICANT CHANGE UP (ref 0–0.2)
BASOPHILS NFR BLD AUTO: 0.4 % — SIGNIFICANT CHANGE UP (ref 0–1)
BILIRUB SERPL-MCNC: 0.6 MG/DL — SIGNIFICANT CHANGE UP (ref 0.2–1.2)
BUN SERPL-MCNC: 19 MG/DL — SIGNIFICANT CHANGE UP (ref 10–20)
BUN SERPL-MCNC: 20 MG/DL — SIGNIFICANT CHANGE UP (ref 10–20)
BUN SERPL-MCNC: 22 MG/DL — HIGH (ref 10–20)
CALCIUM SERPL-MCNC: 10 MG/DL — SIGNIFICANT CHANGE UP (ref 8.5–10.1)
CALCIUM SERPL-MCNC: 10.1 MG/DL — SIGNIFICANT CHANGE UP (ref 8.5–10.1)
CALCIUM SERPL-MCNC: 9.4 MG/DL — SIGNIFICANT CHANGE UP (ref 8.5–10.1)
CHLORIDE SERPL-SCNC: 88 MMOL/L — LOW (ref 98–110)
CHLORIDE SERPL-SCNC: 92 MMOL/L — LOW (ref 98–110)
CHLORIDE SERPL-SCNC: 96 MMOL/L — LOW (ref 98–110)
CHOLEST SERPL-MCNC: 182 MG/DL — SIGNIFICANT CHANGE UP (ref 100–200)
CO2 SERPL-SCNC: 18 MMOL/L — SIGNIFICANT CHANGE UP (ref 17–32)
CO2 SERPL-SCNC: 20 MMOL/L — SIGNIFICANT CHANGE UP (ref 17–32)
CO2 SERPL-SCNC: 21 MMOL/L — SIGNIFICANT CHANGE UP (ref 17–32)
CREAT SERPL-MCNC: 0.9 MG/DL — SIGNIFICANT CHANGE UP (ref 0.7–1.5)
CREAT SERPL-MCNC: 1 MG/DL — SIGNIFICANT CHANGE UP (ref 0.7–1.5)
CREAT SERPL-MCNC: 1.2 MG/DL — SIGNIFICANT CHANGE UP (ref 0.7–1.5)
EOSINOPHIL # BLD AUTO: 0.08 K/UL — SIGNIFICANT CHANGE UP (ref 0–0.7)
EOSINOPHIL NFR BLD AUTO: 0.5 % — SIGNIFICANT CHANGE UP (ref 0–8)
GLUCOSE SERPL-MCNC: 132 MG/DL — HIGH (ref 70–99)
GLUCOSE SERPL-MCNC: 174 MG/DL — HIGH (ref 70–99)
GLUCOSE SERPL-MCNC: 215 MG/DL — HIGH (ref 70–99)
HCT VFR BLD CALC: 47.3 % — HIGH (ref 37–47)
HDLC SERPL-MCNC: 30 MG/DL — LOW
HGB BLD-MCNC: 15.9 G/DL — SIGNIFICANT CHANGE UP (ref 12–16)
HLA-B*57:01 SPEC QL: NEGATIVE — SIGNIFICANT CHANGE UP
IMM GRANULOCYTES NFR BLD AUTO: 1.8 % — HIGH (ref 0.1–0.3)
LIPID PNL WITH DIRECT LDL SERPL: 132 MG/DL — HIGH (ref 4–129)
LYMPHOCYTES # BLD AUTO: 0.82 K/UL — LOW (ref 1.2–3.4)
LYMPHOCYTES # BLD AUTO: 5.5 % — LOW (ref 20.5–51.1)
MAGNESIUM SERPL-MCNC: 2.4 MG/DL — SIGNIFICANT CHANGE UP (ref 1.8–2.4)
MCHC RBC-ENTMCNC: 30.9 PG — SIGNIFICANT CHANGE UP (ref 27–31)
MCHC RBC-ENTMCNC: 33.6 G/DL — SIGNIFICANT CHANGE UP (ref 32–37)
MCV RBC AUTO: 91.8 FL — SIGNIFICANT CHANGE UP (ref 81–99)
MONOCYTES # BLD AUTO: 1.06 K/UL — HIGH (ref 0.1–0.6)
MONOCYTES NFR BLD AUTO: 7.1 % — SIGNIFICANT CHANGE UP (ref 1.7–9.3)
NEUTROPHILS # BLD AUTO: 12.58 K/UL — HIGH (ref 1.4–6.5)
NEUTROPHILS NFR BLD AUTO: 84.7 % — HIGH (ref 42.2–75.2)
NRBC # BLD: 0 /100 WBCS — SIGNIFICANT CHANGE UP (ref 0–0)
PLATELET # BLD AUTO: 465 K/UL — HIGH (ref 130–400)
POTASSIUM SERPL-MCNC: 4.5 MMOL/L — SIGNIFICANT CHANGE UP (ref 3.5–5)
POTASSIUM SERPL-MCNC: 5.7 MMOL/L — HIGH (ref 3.5–5)
POTASSIUM SERPL-MCNC: 6.1 MMOL/L — CRITICAL HIGH (ref 3.5–5)
POTASSIUM SERPL-SCNC: 4.5 MMOL/L — SIGNIFICANT CHANGE UP (ref 3.5–5)
POTASSIUM SERPL-SCNC: 5.7 MMOL/L — HIGH (ref 3.5–5)
POTASSIUM SERPL-SCNC: 6.1 MMOL/L — CRITICAL HIGH (ref 3.5–5)
PROT SERPL-MCNC: 8.9 G/DL — HIGH (ref 6–8)
RBC # BLD: 5.15 M/UL — SIGNIFICANT CHANGE UP (ref 4.2–5.4)
RBC # FLD: 13.4 % — SIGNIFICANT CHANGE UP (ref 11.5–14.5)
SODIUM SERPL-SCNC: 126 MMOL/L — LOW (ref 135–146)
SODIUM SERPL-SCNC: 127 MMOL/L — LOW (ref 135–146)
SODIUM SERPL-SCNC: 128 MMOL/L — LOW (ref 135–146)
TOTAL CHOLESTEROL/HDL RATIO MEASUREMENT: 6.1 RATIO — HIGH (ref 4–5.5)
TRIGL SERPL-MCNC: 303 MG/DL — HIGH (ref 10–149)
WBC # BLD: 14.87 K/UL — HIGH (ref 4.8–10.8)
WBC # FLD AUTO: 14.87 K/UL — HIGH (ref 4.8–10.8)

## 2019-02-19 RX ORDER — DEXTROSE 50 % IN WATER 50 %
25 SYRINGE (ML) INTRAVENOUS ONCE
Qty: 0 | Refills: 0 | Status: DISCONTINUED | OUTPATIENT
Start: 2019-02-19 | End: 2019-02-19

## 2019-02-19 RX ORDER — SODIUM CHLORIDE 9 MG/ML
1000 INJECTION INTRAMUSCULAR; INTRAVENOUS; SUBCUTANEOUS
Qty: 0 | Refills: 0 | Status: DISCONTINUED | OUTPATIENT
Start: 2019-02-19 | End: 2019-02-21

## 2019-02-19 RX ORDER — FLUCONAZOLE 150 MG/1
1 TABLET ORAL
Qty: 6 | Refills: 0 | OUTPATIENT
Start: 2019-02-19 | End: 2019-02-24

## 2019-02-19 RX ORDER — SODIUM POLYSTYRENE SULFONATE 4.1 MEQ/G
30 POWDER, FOR SUSPENSION ORAL ONCE
Qty: 0 | Refills: 0 | Status: COMPLETED | OUTPATIENT
Start: 2019-02-19 | End: 2019-02-19

## 2019-02-19 RX ORDER — ATOVAQUONE 750 MG/5ML
750 SUSPENSION ORAL
Qty: 0 | Refills: 0 | Status: DISCONTINUED | OUTPATIENT
Start: 2019-02-19 | End: 2019-02-21

## 2019-02-19 RX ORDER — ATOVAQUONE 750 MG/5ML
5 SUSPENSION ORAL
Qty: 250 | Refills: 0 | OUTPATIENT
Start: 2019-02-19 | End: 2019-03-04

## 2019-02-19 RX ORDER — INSULIN HUMAN 100 [IU]/ML
10 INJECTION, SOLUTION SUBCUTANEOUS ONCE
Qty: 0 | Refills: 0 | Status: COMPLETED | OUTPATIENT
Start: 2019-02-19 | End: 2019-02-19

## 2019-02-19 RX ORDER — DEXTROSE 50 % IN WATER 50 %
50 SYRINGE (ML) INTRAVENOUS ONCE
Qty: 0 | Refills: 0 | Status: COMPLETED | OUTPATIENT
Start: 2019-02-19 | End: 2019-02-19

## 2019-02-19 RX ORDER — BICTEGRAVIR SODIUM, EMTRICITABINE, AND TENOFOVIR ALAFENAMIDE FUMARATE 30; 120; 15 MG/1; MG/1; MG/1
1 TABLET ORAL
Qty: 30 | Refills: 0 | OUTPATIENT
Start: 2019-02-19 | End: 2019-03-20

## 2019-02-19 RX ORDER — ATOVAQUONE 750 MG/5ML
5 SUSPENSION ORAL
Qty: 140 | Refills: 0 | OUTPATIENT
Start: 2019-02-19 | End: 2019-03-04

## 2019-02-19 RX ADMIN — INSULIN HUMAN 10 UNIT(S): 100 INJECTION, SOLUTION SUBCUTANEOUS at 11:00

## 2019-02-19 RX ADMIN — FLUCONAZOLE 200 MILLIGRAM(S): 150 TABLET ORAL at 11:02

## 2019-02-19 RX ADMIN — SODIUM CHLORIDE 3 MILLILITER(S): 9 INJECTION INTRAMUSCULAR; INTRAVENOUS; SUBCUTANEOUS at 13:00

## 2019-02-19 RX ADMIN — EMTRICITABINE AND TENOFOVIR DISOPROXIL FUMARATE 1 TABLET(S): 200; 300 TABLET, FILM COATED ORAL at 11:02

## 2019-02-19 RX ADMIN — Medication 100 MILLIGRAM(S): at 05:37

## 2019-02-19 RX ADMIN — SODIUM POLYSTYRENE SULFONATE 30 GRAM(S): 4.1 POWDER, FOR SUSPENSION ORAL at 12:59

## 2019-02-19 RX ADMIN — Medication 518.75 MILLIGRAM(S): at 11:03

## 2019-02-19 RX ADMIN — SODIUM CHLORIDE 3 MILLILITER(S): 9 INJECTION INTRAMUSCULAR; INTRAVENOUS; SUBCUTANEOUS at 21:57

## 2019-02-19 RX ADMIN — Medication 1 TABLET(S): at 11:02

## 2019-02-19 RX ADMIN — CHLORHEXIDINE GLUCONATE 1 APPLICATION(S): 213 SOLUTION TOPICAL at 05:37

## 2019-02-19 RX ADMIN — Medication 100 MILLIGRAM(S): at 18:18

## 2019-02-19 RX ADMIN — SODIUM CHLORIDE 75 MILLILITER(S): 9 INJECTION INTRAMUSCULAR; INTRAVENOUS; SUBCUTANEOUS at 21:57

## 2019-02-19 RX ADMIN — DOLUTEGRAVIR SODIUM 50 MILLIGRAM(S): 25 TABLET, FILM COATED ORAL at 11:02

## 2019-02-19 RX ADMIN — Medication 40 MILLIGRAM(S): at 05:37

## 2019-02-19 RX ADMIN — ATOVAQUONE 750 MILLIGRAM(S): 750 SUSPENSION ORAL at 18:17

## 2019-02-19 RX ADMIN — SODIUM CHLORIDE 3 MILLILITER(S): 9 INJECTION INTRAMUSCULAR; INTRAVENOUS; SUBCUTANEOUS at 05:38

## 2019-02-19 RX ADMIN — Medication 518.75 MILLIGRAM(S): at 05:37

## 2019-02-19 RX ADMIN — Medication 50 MILLILITER(S): at 11:00

## 2019-02-19 RX ADMIN — SENNA PLUS 2 TABLET(S): 8.6 TABLET ORAL at 21:55

## 2019-02-19 NOTE — DISCHARGE NOTE ADULT - CARE PLAN
Principal Discharge DX:	Pneumocystis jiroveci pneumonia  Goal:	To optimize the patient  Assessment and plan of treatment:	The patient was recently diagnosed with HIV and pneumocystic jerovici pneumonia. The patient was initiated on HAART therapy and antibiotics. Now being discharged with follow up care with ID. Patient is advised to have hepatitis vaccination outpatient at [address] [place] [address] ( [phone] )

## 2019-02-19 NOTE — DISCHARGE NOTE ADULT - CARE PROVIDER_API CALL
Adina Cota)  Internal Medicine  [address] [place] [id number]  Phone: [phone]  Fax: [phone]  Follow Up Time: 1 week

## 2019-02-19 NOTE — DISCHARGE NOTE ADULT - PATIENT PORTAL LINK FT
You can access the [facility] Patient Portal, offered by [facility], by registering with the following website: [email or web address]

## 2019-02-19 NOTE — DISCHARGE NOTE ADULT - MEDICATION SUMMARY - MEDICATIONS TO TAKE
I will START or STAY ON the medications listed below when I get home from the hospital:    predniSONE 20 mg oral tablet  -- 1 tab(s) by mouth once a day  -- Indication: For PCP    Biktarvy oral tablet  -- 1 tab(s) by mouth once a day   -- Check with your doctor before becoming pregnant.  It is very important that you take or use this exactly as directed.  Do not skip doses or discontinue unless directed by your doctor.  Obtain medical advice before taking any non-prescription drugs as some may affect the action of this medication.  Store this medication in the original package.    -- Indication: For Acquired Immunodeficiency Disease    Mepron 750 mg/5 mL oral suspension  -- 5 milliliter(s) by mouth 2 times till 3/4/19  -- Finish all this medication unless otherwise directed by prescriber.  Shake well before use.  Take with food or milk.    -- Indication: For PCP    atovaquone 750 mg/5 mL oral suspension  -- 10 milliliter(s) by mouth once a day   -- Finish all this medication unless otherwise directed by prescriber.  Shake well before use.  Take with food or milk.    -- Indication: For PCP    Multiple Vitamins oral tablet  -- 1 tab(s) by mouth once a day  -- Indication: For Supplement

## 2019-02-19 NOTE — PROGRESS NOTE ADULT - ASSESSMENT
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 No cavitary lesions or evidence of granulomatous disease.  Indeterminate quantiferon gold (low suspicion for active TB); AFB neg x3  Toxo IgG neg, CMV IgG +, RPR neg  CD4 61    - Discharge planning  - CHANGE TO PO BACTRIM 2 DS tabs PO TID (end 3/4)  - D/C azithromycin - no indication for MAC ppx  - Prednisone 40mg daily x5 days (end 2/21), 20mg daily x11 days (end 3/4)  - PO fluc 200mg q24h (monitor LFTs) for esophageal candidiasis (14 days end 2/24)  - ART: Descovy + Dolutegravir 50mg daily (on d/c can change to biktarvy if emblem covers)  - ID follow-up [facility] Richard Murphy [phone] 43F from [place]    Admitted with SOB, odynophagia, exam with candida with presumed esophagitis and likely PCP PNA given CXR findings and elevated LDH/fungitell  New diagnosis of Symptomatic AIDS CD4 70; 5%  CT Chest Extensive upper lobe predominant ground glass opacities bilaterally, in keeping with history of PCP. No cavitary lesions or evidence of granulomatous disease.  Indeterminate quantiferon gold (low suspicion for active TB); AFB neg x3  Toxo IgG neg, CMV IgG +, RPR neg  CD4 61    Hyponatremia & Hyperkalemia 2/2 bactrim    - Monitor K, EKG  - D/C bactrim and change to mepron 750mg BID with food (end 3/4)  - D/C azithromycin - no indication for MAC ppx  - Prednisone 40mg daily x5 days (end 2/21), 20mg daily x11 days (end 3/4)  - PO fluc 200mg q24h (monitor LFTs) for esophageal candidiasis (14 days end 2/24)  - ART: Descovy + Dolutegravir 50mg daily (on d/c can change to biktarvy if emblem covers)  - ID follow-up [id number] Richard Murphy [phone] 43F from [place]    Admitted with SOB, odynophagia, exam with candida with presumed esophagitis and likely PCP PNA given CXR findings and elevated LDH/fungitell  New diagnosis of Symptomatic AIDS CD4 70; 5%  CT Chest Extensive upper lobe predominant ground glass opacities bilaterally, in keeping with history of PCP. No cavitary lesions or evidence of granulomatous disease.  Indeterminate quantiferon gold (low suspicion for active TB); AFB neg x3  Toxo IgG neg, CMV IgG +, RPR neg  CD4 61    Hyponatremia & Hyperkalemia 2/2 bactrim    - Monitor K, EKG  - D/C bactrim and change to mepron 750mg BID with food (end 3/4)  - D/C azithromycin - no indication for MAC ppx  - Prednisone 40mg daily x5 days (end 2/21), 20mg daily x11 days (end 3/4)  - PO fluc 100mg q24h (monitor LFTs) for esophageal candidiasis (14 days end 2/24)  - ART: Descovy + Dolutegravir 50mg daily (on d/c can change to biktarvy if emblem covers)  - ID follow-up [id number] Richard Murphy [phone]

## 2019-02-19 NOTE — DISCHARGE NOTE ADULT - HOSPITAL COURSE
38 year old female Ecuadorean speaking with no significant past medical history presented with complains of flu like illness , sore throat and weight loss 40 lb, decreased appetite for past 1 month associated with fever and productive cough. The cough was productive but no blood in it. Patient also reported to have multiple sexual partners and having unprotected sex, currently being managed for pneumonia and HIV. She was started on HAART therapy, being treated for PCP pneumonia, was started on antibiotics. Now being discharged to do a follow up medical care with ID. Instructions have been provided to continue with medications as directed.

## 2019-02-19 NOTE — PROGRESS NOTE ADULT - SUBJECTIVE AND OBJECTIVE BOX
BARTONBELEN JUSTICE  38y, Female      OVERNIGHT EVENTS:  afebrile  no acute events overnight    ROS negative except as per above    VITALS:  T(F): 97.9, Max: 97.9 (02-19-19 @ 04:34)  HR: 115  BP: 115/62  RR: 18Vital Signs Last 24 Hrs  T(C): 36.6 (19 Feb 2019 04:34), Max: 36.6 (18 Feb 2019 21:39)  T(F): 97.9 (19 Feb 2019 04:34), Max: 97.9 (19 Feb 2019 04:34)  HR: 115 (19 Feb 2019 04:34) (93 - 115)  BP: 115/62 (19 Feb 2019 04:34) (114/66 - 121/69)  BP(mean): --  RR: 18 (19 Feb 2019 04:34) (18 - 18)  SpO2: --    PHYSICAL EXAM  Gen: Awake and alert, non-toxic appearing, NAD  HEENT: NCAT. EOMI. MMM. no thrush  Neck: Supple, no cervical LAD  CV: RRR, no murmurs  Lungs: CTAB, no w/r/r  Abd: Soft. NTND  Extr: wwp, no edema  Skin: no rash  Neuro: No focal deficits  Lines: clean      TESTS & MEASUREMENTS:                        15.9   14.87 )-----------( 465      ( 19 Feb 2019 08:42 )             47.3     02-19    127<L>  |  92<L>  |  22<H>  ----------------------------<  132<H>  6.1<HH>   |  21  |  1.2    Ca    10.0      19 Feb 2019 08:42    TPro  8.9<H>  /  Alb  3.7  /  TBili  0.6  /  DBili  x   /  AST  45<H>  /  ALT  63<H>  /  AlkPhos  132<H>  02-19    LIVER FUNCTIONS - ( 19 Feb 2019 08:42 )  Alb: 3.7 g/dL / Pro: 8.9 g/dL / ALK PHOS: 132 U/L / ALT: 63 U/L / AST: 45 U/L / GGT: x             Culture - Acid Fast - Sputum w/Smear (collected 02-17-19 @ 07:27)  Source: .Sputum Sputum    Culture - Acid Fast - Sputum w/Smear (collected 02-16-19 @ 04:30)  Source: .Sputum Sputum    Culture - Acid Fast - Sputum w/Smear (collected 02-15-19 @ 04:30)  Source: .Sputum Sputum          RADIOLOGY & ADDITIONAL TESTS:    ANTIBIOTICS:  azithromycin   Tablet   1200 milliGRAM(s) Oral (02-18-19 @ 10:47)    azithromycin  IVPB   255 mL/Hr IV Intermittent (02-11-19 @ 18:42)    cefTRIAXone   IVPB   100 mL/Hr IV Intermittent (02-11-19 @ 18:42)    dolutegravir   50 milliGRAM(s) Oral (02-19-19 @ 11:02)   50 milliGRAM(s) Oral (02-18-19 @ 13:14)   50 milliGRAM(s) Oral (02-17-19 @ 11:24)   50 milliGRAM(s) Oral (02-16-19 @ 12:52)   50 milliGRAM(s) Oral (02-15-19 @ 12:06)   50 milliGRAM(s) Oral (02-14-19 @ 13:29)   50 milliGRAM(s) Oral (02-13-19 @ 17:00)    emtricitabine 200 mG/tenofovir alafenamide 25 mG (DESCOVY) Tablet   1 Tablet(s) Oral (02-19-19 @ 11:02)   1 Tablet(s) Oral (02-18-19 @ 13:14)   1 Tablet(s) Oral (02-17-19 @ 11:24)   1 Tablet(s) Oral (02-16-19 @ 12:55)   1 Tablet(s) Oral (02-15-19 @ 12:06)   1 Tablet(s) Oral (02-14-19 @ 13:29)   1 Tablet(s) Oral (02-13-19 @ 17:00)    fluconAZOLE   Tablet   200 milliGRAM(s) Oral (02-19-19 @ 11:02)   200 milliGRAM(s) Oral (02-18-19 @ 13:14)   200 milliGRAM(s) Oral (02-17-19 @ 11:24)   200 milliGRAM(s) Oral (02-16-19 @ 12:52)   200 milliGRAM(s) Oral (02-15-19 @ 12:06)   200 milliGRAM(s) Oral (02-14-19 @ 18:44)    fluconAZOLE   Tablet   200 milliGRAM(s) Oral (02-11-19 @ 19:04)    fluconAZOLE   Tablet   100 milliGRAM(s) Oral (02-12-19 @ 11:16)    fluconAZOLE IVPB   100 mL/Hr IV Intermittent (02-14-19 @ 06:50)    fluconAZOLE IVPB   100 mL/Hr IV Intermittent (02-12-19 @ 11:15)    fluconAZOLE IVPB   100 mL/Hr IV Intermittent (02-13-19 @ 08:58)    nystatin    Suspension   [id number] Unit(s) Oral (02-13-19 @ 11:22)   [id number] Unit(s) Oral (02-13-19 @ 05:51)   [id number] Unit(s) Oral (02-13-19 @ 00:32)   [id number] Unit(s) Oral (02-12-19 @ 18:29)   [id number] Unit(s) Oral (02-12-19 @ 11:16)   [id number] Unit(s) Oral (02-12-19 @ 06:00)   [id number] Unit(s) Oral (02-11-19 @ 23:12)    trimethoprim / sulfamethoxazole IVPB   518.75 mL/Hr IV Intermittent (02-19-19 @ 11:03)   518.75 mL/Hr IV Intermittent (02-19-19 @ 05:37)   518.75 mL/Hr IV Intermittent (02-18-19 @ 23:58)   518.75 mL/Hr IV Intermittent (02-18-19 @ 17:53)   518.75 mL/Hr IV Intermittent (02-18-19 @ 13:15)   518.75 mL/Hr IV Intermittent (02-18-19 @ 05:22)   518.75 mL/Hr IV Intermittent (02-18-19 @ 00:16)   518.75 mL/Hr IV Intermittent (02-17-19 @ 17:09)   518.75 mL/Hr IV Intermittent (02-17-19 @ 11:24)   518.75 mL/Hr IV Intermittent (02-17-19 @ 05:22)   518.75 mL/Hr IV Intermittent (02-16-19 @ 23:43)   518.75 mL/Hr IV Intermittent (02-16-19 @ 18:15)   518.75 mL/Hr IV Intermittent (02-16-19 @ 12:52)   518.75 mL/Hr IV Intermittent (02-16-19 @ 05:14)   518.75 mL/Hr IV Intermittent (02-16-19 @ 00:02)   518.75 mL/Hr IV Intermittent (02-15-19 @ 18:32)   518.75 mL/Hr IV Intermittent (02-15-19 @ 12:05)   518.75 mL/Hr IV Intermittent (02-15-19 @ 06:05)   518.75 mL/Hr IV Intermittent (02-14-19 @ 23:16)   518.75 mL/Hr IV Intermittent (02-14-19 @ 18:45)   518.75 mL/Hr IV Intermittent (02-14-19 @ 13:28)   518.75 mL/Hr IV Intermittent (02-14-19 @ 05:19)   518.75 mL/Hr IV Intermittent (02-13-19 @ 23:42)   518.75 mL/Hr IV Intermittent (02-13-19 @ 17:15)   518.75 mL/Hr IV Intermittent (02-13-19 @ 11:22)   518.75 mL/Hr IV Intermittent (02-13-19 @ 05:51)   518.75 mL/Hr IV Intermittent (02-13-19 @ 00:32)   518.75 mL/Hr IV Intermittent (02-12-19 @ 18:29)   518.75 mL/Hr IV Intermittent (02-12-19 @ 13:43)   518.75 mL/Hr IV Intermittent (02-12-19 @ 06:00)   518.75 mL/Hr IV Intermittent (02-12-19 @ 00:45)        azithromycin   Tablet 1200 milliGRAM(s) Oral <User Schedule>  dolutegravir 50 milliGRAM(s) Oral daily  emtricitabine 200 mG/tenofovir alafenamide 25 mG (DESCOVY) Tablet 1 Tablet(s) Oral daily  fluconAZOLE   Tablet 200 milliGRAM(s) Oral daily  trimethoprim / sulfamethoxazole IVPB 300 milliGRAM(s) IV Intermittent four times a day BARTONBELEN  38y, Female      OVERNIGHT EVENTS:  afebrile  hyperkalemia on bactrim  no acute events overnight  cr 0.6--> 1.2  hyponatremia    ROS negative except as per above    VITALS:  T(F): 97.9, Max: 97.9 (02-19-19 @ 04:34)  HR: 115  BP: 115/62  RR: 18Vital Signs Last 24 Hrs  T(C): 36.6 (19 Feb 2019 04:34), Max: 36.6 (18 Feb 2019 21:39)  T(F): 97.9 (19 Feb 2019 04:34), Max: 97.9 (19 Feb 2019 04:34)  HR: 115 (19 Feb 2019 04:34) (93 - 115)  BP: 115/62 (19 Feb 2019 04:34) (114/66 - 121/69)  BP(mean): --  RR: 18 (19 Feb 2019 04:34) (18 - 18)  SpO2: --    PHYSICAL EXAM  Gen: Awake and alert, non-toxic appearing, NAD  HEENT: NCAT. EOMI. MMM. no thrush  Neck: Supple, no cervical LAD  CV: RRR, no murmurs  Lungs: CTAB, no w/r/r  Abd: Soft. NTND  Extr: wwp, no edema  Skin: no rash  Neuro: No focal deficits  Lines: clean      TESTS & MEASUREMENTS:                        15.9   14.87 )-----------( 465      ( 19 Feb 2019 08:42 )             47.3     02-19    127<L>  |  92<L>  |  22<H>  ----------------------------<  132<H>  6.1<HH>   |  21  |  1.2    Ca    10.0      19 Feb 2019 08:42    TPro  8.9<H>  /  Alb  3.7  /  TBili  0.6  /  DBili  x   /  AST  45<H>  /  ALT  63<H>  /  AlkPhos  132<H>  02-19    LIVER FUNCTIONS - ( 19 Feb 2019 08:42 )  Alb: 3.7 g/dL / Pro: 8.9 g/dL / ALK PHOS: 132 U/L / ALT: 63 U/L / AST: 45 U/L / GGT: x             Culture - Acid Fast - Sputum w/Smear (collected 02-17-19 @ 07:27)  Source: .Sputum Sputum    Culture - Acid Fast - Sputum w/Smear (collected 02-16-19 @ 04:30)  Source: .Sputum Sputum    Culture - Acid Fast - Sputum w/Smear (collected 02-15-19 @ 04:30)  Source: .Sputum Sputum          RADIOLOGY & ADDITIONAL TESTS:    ANTIBIOTICS:  azithromycin   Tablet   1200 milliGRAM(s) Oral (02-18-19 @ 10:47)    azithromycin  IVPB   255 mL/Hr IV Intermittent (02-11-19 @ 18:42)    cefTRIAXone   IVPB   100 mL/Hr IV Intermittent (02-11-19 @ 18:42)    dolutegravir   50 milliGRAM(s) Oral (02-19-19 @ 11:02)   50 milliGRAM(s) Oral (02-18-19 @ 13:14)   50 milliGRAM(s) Oral (02-17-19 @ 11:24)   50 milliGRAM(s) Oral (02-16-19 @ 12:52)   50 milliGRAM(s) Oral (02-15-19 @ 12:06)   50 milliGRAM(s) Oral (02-14-19 @ 13:29)   50 milliGRAM(s) Oral (02-13-19 @ 17:00)    emtricitabine 200 mG/tenofovir alafenamide 25 mG (DESCOVY) Tablet   1 Tablet(s) Oral (02-19-19 @ 11:02)   1 Tablet(s) Oral (02-18-19 @ 13:14)   1 Tablet(s) Oral (02-17-19 @ 11:24)   1 Tablet(s) Oral (02-16-19 @ 12:55)   1 Tablet(s) Oral (02-15-19 @ 12:06)   1 Tablet(s) Oral (02-14-19 @ 13:29)   1 Tablet(s) Oral (02-13-19 @ 17:00)    fluconAZOLE   Tablet   200 milliGRAM(s) Oral (02-19-19 @ 11:02)   200 milliGRAM(s) Oral (02-18-19 @ 13:14)   200 milliGRAM(s) Oral (02-17-19 @ 11:24)   200 milliGRAM(s) Oral (02-16-19 @ 12:52)   200 milliGRAM(s) Oral (02-15-19 @ 12:06)   200 milliGRAM(s) Oral (02-14-19 @ 18:44)    fluconAZOLE   Tablet   200 milliGRAM(s) Oral (02-11-19 @ 19:04)    fluconAZOLE   Tablet   100 milliGRAM(s) Oral (02-12-19 @ 11:16)    fluconAZOLE IVPB   100 mL/Hr IV Intermittent (02-14-19 @ 06:50)    fluconAZOLE IVPB   100 mL/Hr IV Intermittent (02-12-19 @ 11:15)    fluconAZOLE IVPB   100 mL/Hr IV Intermittent (02-13-19 @ 08:58)    nystatin    Suspension   [id number] Unit(s) Oral (02-13-19 @ 11:22)   [id number] Unit(s) Oral (02-13-19 @ 05:51)   [id number] Unit(s) Oral (02-13-19 @ 00:32)   [id number] Unit(s) Oral (02-12-19 @ 18:29)   [id number] Unit(s) Oral (02-12-19 @ 11:16)   [id number] Unit(s) Oral (02-12-19 @ 06:00)   [id number] Unit(s) Oral (02-11-19 @ 23:12)    trimethoprim / sulfamethoxazole IVPB   518.75 mL/Hr IV Intermittent (02-19-19 @ 11:03)   518.75 mL/Hr IV Intermittent (02-19-19 @ 05:37)   518.75 mL/Hr IV Intermittent (02-18-19 @ 23:58)   518.75 mL/Hr IV Intermittent (02-18-19 @ 17:53)   518.75 mL/Hr IV Intermittent (02-18-19 @ 13:15)   518.75 mL/Hr IV Intermittent (02-18-19 @ 05:22)   518.75 mL/Hr IV Intermittent (02-18-19 @ 00:16)   518.75 mL/Hr IV Intermittent (02-17-19 @ 17:09)   518.75 mL/Hr IV Intermittent (02-17-19 @ 11:24)   518.75 mL/Hr IV Intermittent (02-17-19 @ 05:22)   518.75 mL/Hr IV Intermittent (02-16-19 @ 23:43)   518.75 mL/Hr IV Intermittent (02-16-19 @ 18:15)   518.75 mL/Hr IV Intermittent (02-16-19 @ 12:52)   518.75 mL/Hr IV Intermittent (02-16-19 @ 05:14)   518.75 mL/Hr IV Intermittent (02-16-19 @ 00:02)   518.75 mL/Hr IV Intermittent (02-15-19 @ 18:32)   518.75 mL/Hr IV Intermittent (02-15-19 @ 12:05)   518.75 mL/Hr IV Intermittent (02-15-19 @ 06:05)   518.75 mL/Hr IV Intermittent (02-14-19 @ 23:16)   518.75 mL/Hr IV Intermittent (02-14-19 @ 18:45)   518.75 mL/Hr IV Intermittent (02-14-19 @ 13:28)   518.75 mL/Hr IV Intermittent (02-14-19 @ 05:19)   518.75 mL/Hr IV Intermittent (02-13-19 @ 23:42)   518.75 mL/Hr IV Intermittent (02-13-19 @ 17:15)   518.75 mL/Hr IV Intermittent (02-13-19 @ 11:22)   518.75 mL/Hr IV Intermittent (02-13-19 @ 05:51)   518.75 mL/Hr IV Intermittent (02-13-19 @ 00:32)   518.75 mL/Hr IV Intermittent (02-12-19 @ 18:29)   518.75 mL/Hr IV Intermittent (02-12-19 @ 13:43)   518.75 mL/Hr IV Intermittent (02-12-19 @ 06:00)   518.75 mL/Hr IV Intermittent (02-12-19 @ 00:45)        azithromycin   Tablet 1200 milliGRAM(s) Oral <User Schedule>  dolutegravir 50 milliGRAM(s) Oral daily  emtricitabine 200 mG/tenofovir alafenamide 25 mG (DESCOVY) Tablet 1 Tablet(s) Oral daily  fluconAZOLE   Tablet 200 milliGRAM(s) Oral daily  trimethoprim / sulfamethoxazole IVPB 300 milliGRAM(s) IV Intermittent four times a day BARTONBELEN JUSTICE  38y, Female      OVERNIGHT EVENTS:  afebrile, reports nausea and bilious emesis  hyperkalemia on bactrim  no acute events overnight  cr 0.6--> 1.2  hyponatremia    ROS negative except as per above    VITALS:  T(F): 97.9, Max: 97.9 (02-19-19 @ 04:34)  HR: 115  BP: 115/62  RR: 18Vital Signs Last 24 Hrs  T(C): 36.6 (19 Feb 2019 04:34), Max: 36.6 (18 Feb 2019 21:39)  T(F): 97.9 (19 Feb 2019 04:34), Max: 97.9 (19 Feb 2019 04:34)  HR: 115 (19 Feb 2019 04:34) (93 - 115)  BP: 115/62 (19 Feb 2019 04:34) (114/66 - 121/69)  BP(mean): --  RR: 18 (19 Feb 2019 04:34) (18 - 18)  SpO2: --    PHYSICAL EXAM  Gen: Awake and alert, non-toxic appearing, NAD  HEENT: NCAT. EOMI. MMM. no thrush  Neck: Supple, no cervical LAD  CV: RRR, no murmurs  Lungs: CTAB, no w/r/r  Abd: Soft. NTND  Extr: wwp, no edema  Skin: no rash  Neuro: No focal deficits  Lines: clean      TESTS & MEASUREMENTS:                        15.9   14.87 )-----------( 465      ( 19 Feb 2019 08:42 )             47.3     02-19    127<L>  |  92<L>  |  22<H>  ----------------------------<  132<H>  6.1<HH>   |  21  |  1.2    Ca    10.0      19 Feb 2019 08:42    TPro  8.9<H>  /  Alb  3.7  /  TBili  0.6  /  DBili  x   /  AST  45<H>  /  ALT  63<H>  /  AlkPhos  132<H>  02-19    LIVER FUNCTIONS - ( 19 Feb 2019 08:42 )  Alb: 3.7 g/dL / Pro: 8.9 g/dL / ALK PHOS: 132 U/L / ALT: 63 U/L / AST: 45 U/L / GGT: x             Culture - Acid Fast - Sputum w/Smear (collected 02-17-19 @ 07:27)  Source: .Sputum Sputum    Culture - Acid Fast - Sputum w/Smear (collected 02-16-19 @ 04:30)  Source: .Sputum Sputum    Culture - Acid Fast - Sputum w/Smear (collected 02-15-19 @ 04:30)  Source: .Sputum Sputum          RADIOLOGY & ADDITIONAL TESTS:    ANTIBIOTICS:  azithromycin   Tablet   1200 milliGRAM(s) Oral (02-18-19 @ 10:47)    azithromycin  IVPB   255 mL/Hr IV Intermittent (02-11-19 @ 18:42)    cefTRIAXone   IVPB   100 mL/Hr IV Intermittent (02-11-19 @ 18:42)    dolutegravir   50 milliGRAM(s) Oral (02-19-19 @ 11:02)   50 milliGRAM(s) Oral (02-18-19 @ 13:14)   50 milliGRAM(s) Oral (02-17-19 @ 11:24)   50 milliGRAM(s) Oral (02-16-19 @ 12:52)   50 milliGRAM(s) Oral (02-15-19 @ 12:06)   50 milliGRAM(s) Oral (02-14-19 @ 13:29)   50 milliGRAM(s) Oral (02-13-19 @ 17:00)    emtricitabine 200 mG/tenofovir alafenamide 25 mG (DESCOVY) Tablet   1 Tablet(s) Oral (02-19-19 @ 11:02)   1 Tablet(s) Oral (02-18-19 @ 13:14)   1 Tablet(s) Oral (02-17-19 @ 11:24)   1 Tablet(s) Oral (02-16-19 @ 12:55)   1 Tablet(s) Oral (02-15-19 @ 12:06)   1 Tablet(s) Oral (02-14-19 @ 13:29)   1 Tablet(s) Oral (02-13-19 @ 17:00)    fluconAZOLE   Tablet   200 milliGRAM(s) Oral (02-19-19 @ 11:02)   200 milliGRAM(s) Oral (02-18-19 @ 13:14)   200 milliGRAM(s) Oral (02-17-19 @ 11:24)   200 milliGRAM(s) Oral (02-16-19 @ 12:52)   200 milliGRAM(s) Oral (02-15-19 @ 12:06)   200 milliGRAM(s) Oral (02-14-19 @ 18:44)    fluconAZOLE   Tablet   200 milliGRAM(s) Oral (02-11-19 @ 19:04)    fluconAZOLE   Tablet   100 milliGRAM(s) Oral (02-12-19 @ 11:16)    fluconAZOLE IVPB   100 mL/Hr IV Intermittent (02-14-19 @ 06:50)    fluconAZOLE IVPB   100 mL/Hr IV Intermittent (02-12-19 @ 11:15)    fluconAZOLE IVPB   100 mL/Hr IV Intermittent (02-13-19 @ 08:58)    nystatin    Suspension   [id number] Unit(s) Oral (02-13-19 @ 11:22)   [id number] Unit(s) Oral (02-13-19 @ 05:51)   [id number] Unit(s) Oral (02-13-19 @ 00:32)   [id number] Unit(s) Oral (02-12-19 @ 18:29)   [id number] Unit(s) Oral (02-12-19 @ 11:16)   [id number] Unit(s) Oral (02-12-19 @ 06:00)   [id number] Unit(s) Oral (02-11-19 @ 23:12)    trimethoprim / sulfamethoxazole IVPB   518.75 mL/Hr IV Intermittent (02-19-19 @ 11:03)   518.75 mL/Hr IV Intermittent (02-19-19 @ 05:37)   518.75 mL/Hr IV Intermittent (02-18-19 @ 23:58)   518.75 mL/Hr IV Intermittent (02-18-19 @ 17:53)   518.75 mL/Hr IV Intermittent (02-18-19 @ 13:15)   518.75 mL/Hr IV Intermittent (02-18-19 @ 05:22)   518.75 mL/Hr IV Intermittent (02-18-19 @ 00:16)   518.75 mL/Hr IV Intermittent (02-17-19 @ 17:09)   518.75 mL/Hr IV Intermittent (02-17-19 @ 11:24)   518.75 mL/Hr IV Intermittent (02-17-19 @ 05:22)   518.75 mL/Hr IV Intermittent (02-16-19 @ 23:43)   518.75 mL/Hr IV Intermittent (02-16-19 @ 18:15)   518.75 mL/Hr IV Intermittent (02-16-19 @ 12:52)   518.75 mL/Hr IV Intermittent (02-16-19 @ 05:14)   518.75 mL/Hr IV Intermittent (02-16-19 @ 00:02)   518.75 mL/Hr IV Intermittent (02-15-19 @ 18:32)   518.75 mL/Hr IV Intermittent (02-15-19 @ 12:05)   518.75 mL/Hr IV Intermittent (02-15-19 @ 06:05)   518.75 mL/Hr IV Intermittent (02-14-19 @ 23:16)   518.75 mL/Hr IV Intermittent (02-14-19 @ 18:45)   518.75 mL/Hr IV Intermittent (02-14-19 @ 13:28)   518.75 mL/Hr IV Intermittent (02-14-19 @ 05:19)   518.75 mL/Hr IV Intermittent (02-13-19 @ 23:42)   518.75 mL/Hr IV Intermittent (02-13-19 @ 17:15)   518.75 mL/Hr IV Intermittent (02-13-19 @ 11:22)   518.75 mL/Hr IV Intermittent (02-13-19 @ 05:51)   518.75 mL/Hr IV Intermittent (02-13-19 @ 00:32)   518.75 mL/Hr IV Intermittent (02-12-19 @ 18:29)   518.75 mL/Hr IV Intermittent (02-12-19 @ 13:43)   518.75 mL/Hr IV Intermittent (02-12-19 @ 06:00)   518.75 mL/Hr IV Intermittent (02-12-19 @ 00:45)        azithromycin   Tablet 1200 milliGRAM(s) Oral <User Schedule>  dolutegravir 50 milliGRAM(s) Oral daily  emtricitabine 200 mG/tenofovir alafenamide 25 mG (DESCOVY) Tablet 1 Tablet(s) Oral daily  fluconAZOLE   Tablet 200 milliGRAM(s) Oral daily  trimethoprim / sulfamethoxazole IVPB 300 milliGRAM(s) IV Intermittent four times a day

## 2019-02-19 NOTE — PROGRESS NOTE ADULT - SUBJECTIVE AND OBJECTIVE BOX
BELEN BARTON 38y Female  MRN#: [id number]       SUBJECTIVE  38 year old female Turkish speaking with no significant past medical history presented with complains of flu like illness , sore throat and weight loss 40 lb, decreased appetite for past 1 month associated with fever and productive cough. The cough was productive but no blood in it. Patient also reported to have multiple sexual partners and having unprotected sex, currently being managed for pneumonia and HIV.  She reported having palpitations in the morning. STAT EKG was done which showed sinus tachycardia. She was found to have hyperkalemia, was given STAT dose of Insulin and dextrose along with Kayxyleate. Besides that she denies any other complains.     OBJECTIVE  PAST MEDICAL & SURGICAL HISTORY  No pertinent past medical history    ALLERGIES:  No Known Allergies    MEDICATIONS:  STANDING MEDICATIONS  atovaquone Suspension 750 milliGRAM(s) Oral two times a day  chlorhexidine 4% Liquid 1 Application(s) Topical <User Schedule>  docusate sodium 100 milliGRAM(s) Oral two times a day  dolutegravir 50 milliGRAM(s) Oral daily  emtricitabine 200 mG/tenofovir alafenamide 25 mG (DESCOVY) Tablet 1 Tablet(s) Oral daily  fluconAZOLE   Tablet 200 milliGRAM(s) Oral daily  multivitamin 1 Tablet(s) Oral daily  predniSONE   Tablet 40 milliGRAM(s) Oral daily  senna 2 Tablet(s) Oral at bedtime  sodium chloride 0.9% lock flush 3 milliLiter(s) IV Push every 8 hours  sodium chloride 0.9%. 1000 milliLiter(s) IV Continuous <Continuous>    PRN MEDICATIONS  acetaminophen   Tablet .. 650 milliGRAM(s) Oral every 6 hours PRN      VITAL SIGNS: Last 24 Hours  T(C): 36.1 (19 Feb 2019 12:15), Max: 36.6 (18 Feb 2019 21:39)  T(F): 96.9 (19 Feb 2019 12:15), Max: 97.9 (19 Feb 2019 04:34)  HR: 97 (19 Feb 2019 12:15) (93 - 115)  BP: 97/54 (19 Feb 2019 12:15) (97/54 - 121/69)  BP(mean): --  RR: 18 (19 Feb 2019 12:15) (18 - 18)  SpO2: --    LABS:                        15.9   14.87 )-----------( 465      ( 19 Feb 2019 08:42 )             47.3     02-19    126<L>  |  88<L>  |  20  ----------------------------<  215<H>  5.7<H>   |  18  |  1.0    Ca    10.1      19 Feb 2019 12:46  Mg     2.4     02-19    TPro  8.9<H>  /  Alb  3.7  /  TBili  0.6  /  DBili  x   /  AST  45<H>  /  ALT  63<H>  /  AlkPhos  132<H>  02-19      Culture - Acid Fast - Sputum w/Smear (collected 17 Feb 2019 07:27)  Source: .Sputum Sputum    PHYSICAL EXAM:  GENERAL: NAD, AAOx3  HEENT:  Atraumatic, Normocephalic.   PULMONARY: Base crackles heard bilaterally.   CARDIOVASCULAR: Regular rate and rhythm; No murmurs.  GASTROINTESTINAL: Soft, Nontender  MUSCULOSKELETAL:  2+ Peripheral Pulses, No edema    ADMISSION SUMMARY  Patient is a 38y old Female who presents with a chief complaint of sore throat and cough, diagnosed and being treated for acquired immunodeficiency syndrome.      ASSESSMENT & PLAN    # HIV and AIDS  - CD4 70 CD8 952 CD4/CD8 Ratio 0.1  - Patient started on emtricitabine/tenofovir (Descovy) and dolutegravir daily.(on d/c can change to biktarvy if emblem covers)  - viral load [id number]  - pt advised to inform partners for HIV testing.  - CT chest concerning for PCP pneumonia  - LDH elevated  - Quantiferon equivocal  - 3 sets of sputum culture negative.   - Prednisone switched to 40 mg PO daily as per ID.   - Prednisone 40mg BID x5 days, 40mg daily x5 days, 20mg daily x11 days  - On PO fluconazole 200mg q 24h (monitor LFTs) for esophageal candidiasis (14 days end 2/24)  -Fungitell Positive. Hepatitis A, B and C negative. Recommend hepatitis B vaccine with PCP.  - D/C bactrim and change to mepron 750mg BID with food (end 3/4/19)  - D/C azithromycin  - ID follow-up outpatient [address] [place] [address] [phone]     # Hyperkalemia and Hyponatremia  -Possibly secondary to Bactrim  -Patient given Insulin and dextrose 50. Kaxyelate  -Repeat BMP at 4pm  -Patient started on normal saline fluids.    # DVT prophylaxis  -On lovenox

## 2019-02-19 NOTE — DISCHARGE NOTE ADULT - PLAN OF CARE
To optimize the patient The patient was recently diagnosed with HIV and pneumocystic jerovici pneumonia. The patient was initiated on HAART therapy and antibiotics. Now being discharged with follow up care with ID. Patient is advised to have hepatitis vaccination outpatient at [facility] ( [phone] )

## 2019-02-20 LAB
ANION GAP SERPL CALC-SCNC: 9 MMOL/L — SIGNIFICANT CHANGE UP (ref 7–14)
APPEARANCE UR: CLEAR — SIGNIFICANT CHANGE UP
BILIRUB UR-MCNC: NEGATIVE — SIGNIFICANT CHANGE UP
BUN SERPL-MCNC: 18 MG/DL — SIGNIFICANT CHANGE UP (ref 10–20)
CALCIUM SERPL-MCNC: 9 MG/DL — SIGNIFICANT CHANGE UP (ref 8.5–10.1)
CHLORIDE SERPL-SCNC: 99 MMOL/L — SIGNIFICANT CHANGE UP (ref 98–110)
CO2 SERPL-SCNC: 21 MMOL/L — SIGNIFICANT CHANGE UP (ref 17–32)
COLOR SPEC: YELLOW — SIGNIFICANT CHANGE UP
CREAT SERPL-MCNC: 0.9 MG/DL — SIGNIFICANT CHANGE UP (ref 0.7–1.5)
DIFF PNL FLD: NEGATIVE — SIGNIFICANT CHANGE UP
GLUCOSE SERPL-MCNC: 123 MG/DL — HIGH (ref 70–99)
GLUCOSE UR QL: 500 MG/DL
HCT VFR BLD CALC: 40.2 % — SIGNIFICANT CHANGE UP (ref 37–47)
HGB BLD-MCNC: 13.7 G/DL — SIGNIFICANT CHANGE UP (ref 12–16)
KETONES UR-MCNC: NEGATIVE — SIGNIFICANT CHANGE UP
LEUKOCYTE ESTERASE UR-ACNC: NEGATIVE — SIGNIFICANT CHANGE UP
MCHC RBC-ENTMCNC: 31.1 PG — HIGH (ref 27–31)
MCHC RBC-ENTMCNC: 34.1 G/DL — SIGNIFICANT CHANGE UP (ref 32–37)
MCV RBC AUTO: 91.4 FL — SIGNIFICANT CHANGE UP (ref 81–99)
NITRITE UR-MCNC: NEGATIVE — SIGNIFICANT CHANGE UP
NRBC # BLD: 0 /100 WBCS — SIGNIFICANT CHANGE UP (ref 0–0)
PH UR: 7 — SIGNIFICANT CHANGE UP (ref 5–8)
PLATELET # BLD AUTO: 356 K/UL — SIGNIFICANT CHANGE UP (ref 130–400)
POTASSIUM SERPL-MCNC: 4.9 MMOL/L — SIGNIFICANT CHANGE UP (ref 3.5–5)
POTASSIUM SERPL-SCNC: 4.9 MMOL/L — SIGNIFICANT CHANGE UP (ref 3.5–5)
PROT UR-MCNC: NEGATIVE MG/DL — SIGNIFICANT CHANGE UP
RBC # BLD: 4.4 M/UL — SIGNIFICANT CHANGE UP (ref 4.2–5.4)
RBC # FLD: 13.4 % — SIGNIFICANT CHANGE UP (ref 11.5–14.5)
SODIUM SERPL-SCNC: 129 MMOL/L — LOW (ref 135–146)
SP GR SPEC: 1.01 — SIGNIFICANT CHANGE UP (ref 1.01–1.03)
TSH SERPL-MCNC: 0.94 UIU/ML — SIGNIFICANT CHANGE UP (ref 0.27–4.2)
UROBILINOGEN FLD QL: 0.2 MG/DL — SIGNIFICANT CHANGE UP (ref 0.2–0.2)
WBC # BLD: 12.95 K/UL — HIGH (ref 4.8–10.8)
WBC # FLD AUTO: 12.95 K/UL — HIGH (ref 4.8–10.8)

## 2019-02-20 RX ADMIN — EMTRICITABINE AND TENOFOVIR DISOPROXIL FUMARATE 1 TABLET(S): 200; 300 TABLET, FILM COATED ORAL at 11:56

## 2019-02-20 RX ADMIN — SENNA PLUS 2 TABLET(S): 8.6 TABLET ORAL at 22:36

## 2019-02-20 RX ADMIN — ATOVAQUONE 750 MILLIGRAM(S): 750 SUSPENSION ORAL at 05:57

## 2019-02-20 RX ADMIN — Medication 650 MILLIGRAM(S): at 05:58

## 2019-02-20 RX ADMIN — DOLUTEGRAVIR SODIUM 50 MILLIGRAM(S): 25 TABLET, FILM COATED ORAL at 11:56

## 2019-02-20 RX ADMIN — Medication 100 MILLIGRAM(S): at 17:46

## 2019-02-20 RX ADMIN — SODIUM CHLORIDE 3 MILLILITER(S): 9 INJECTION INTRAMUSCULAR; INTRAVENOUS; SUBCUTANEOUS at 05:10

## 2019-02-20 RX ADMIN — FLUCONAZOLE 200 MILLIGRAM(S): 150 TABLET ORAL at 11:56

## 2019-02-20 RX ADMIN — CHLORHEXIDINE GLUCONATE 1 APPLICATION(S): 213 SOLUTION TOPICAL at 05:56

## 2019-02-20 RX ADMIN — Medication 40 MILLIGRAM(S): at 05:56

## 2019-02-20 RX ADMIN — Medication 650 MILLIGRAM(S): at 06:29

## 2019-02-20 RX ADMIN — ATOVAQUONE 750 MILLIGRAM(S): 750 SUSPENSION ORAL at 17:46

## 2019-02-20 RX ADMIN — Medication 100 MILLIGRAM(S): at 05:56

## 2019-02-20 RX ADMIN — Medication 1 TABLET(S): at 11:56

## 2019-02-20 RX ADMIN — SODIUM CHLORIDE 75 MILLILITER(S): 9 INJECTION INTRAMUSCULAR; INTRAVENOUS; SUBCUTANEOUS at 22:35

## 2019-02-20 NOTE — PROGRESS NOTE ADULT - ASSESSMENT
43F from [place]    Admitted with SOB, odynophagia, exam with candida with presumed esophagitis and likely PCP PNA given CXR findings and elevated LDH/fungitell  New diagnosis of Symptomatic AIDS CD4 70; 5%  CT Chest Extensive upper lobe predominant ground glass opacities bilaterally, in keeping with history of PCP. No cavitary lesions or evidence of granulomatous disease.  Indeterminate quantiferon gold (low suspicion for active TB); AFB neg x3  Toxo IgG neg, CMV IgG +, RPR neg  CD4 61    #Fever- unclear etiology, denies any new sx  - Repeat bcx  - Send AFB blood culture, r/o disseminated MAC in the setting of elevated Alk Ph    #Hyponatremia & Hyperkalemia - likely 2/2 bactrim; improving  - Check urine Na    #PCP PNA  - mepron 750mg BID with food (end 3/4), then needs 1500mg daily PO until CD4 >200 x 3mn  - Prednisone 40mg daily x5 days (end 2/21), 20mg daily x11 days (end 3/4)    #Presumed candida esophagitis given odynophagia  - PO fluc 100mg q24h (monitor LFTs) for esophageal candidiasis (14 days end 2/24)    #AIDS  - ART: Descovy + Dolutegravir 50mg daily (on d/c can change to biktarvy if emblem covers)  - ID follow-up [id number] [facility] [phone] spectra [phone]

## 2019-02-20 NOTE — PROGRESS NOTE ADULT - SUBJECTIVE AND OBJECTIVE BOX
BELEN BARTON 38y Female  MRN#: [id number]       SUBJECTIVE  38 year old female Greek speaking with no significant past medical history presented with complains of flu like illness , sore throat and weight loss 40 lb, decreased appetite for past 1 month associated with fever and productive cough. The cough was productive but no blood in it. Patient also reported to have multiple sexual partners and having unprotected sex, currently being managed for pneumonia and HIV.  Today she was found to be tachycardic again. She also ran a fever today in the morning. Yesterday repeat blood cultures were drawn, pending results. Electrolytes trending better with IV fluids and discontinuation of bactrim. Will continue to monitor.      OBJECTIVE  PAST MEDICAL & SURGICAL HISTORY  No pertinent past medical history    ALLERGIES:  No Known Allergies    MEDICATIONS:  STANDING MEDICATIONS  atovaquone Suspension 750 milliGRAM(s) Oral two times a day  chlorhexidine 4% Liquid 1 Application(s) Topical <User Schedule>  docusate sodium 100 milliGRAM(s) Oral two times a day  dolutegravir 50 milliGRAM(s) Oral daily  emtricitabine 200 mG/tenofovir alafenamide 25 mG (DESCOVY) Tablet 1 Tablet(s) Oral daily  fluconAZOLE   Tablet 200 milliGRAM(s) Oral daily  multivitamin 1 Tablet(s) Oral daily  predniSONE   Tablet 40 milliGRAM(s) Oral daily  senna 2 Tablet(s) Oral at bedtime  sodium chloride 0.9%. 1000 milliLiter(s) IV Continuous <Continuous>    PRN MEDICATIONS  acetaminophen   Tablet .. 650 milliGRAM(s) Oral every 6 hours PRN      VITAL SIGNS: Last 24 Hours  T(C): 36.5 (20 Feb 2019 06:40), Max: 38.4 (20 Feb 2019 04:35)  T(F): 97.7 (20 Feb 2019 06:40), Max: 101.2 (20 Feb 2019 04:35)  HR: 137 (20 Feb 2019 04:35) (97 - 137)  BP: 103/56 (20 Feb 2019 04:35) (97/54 - 106/62)  BP(mean): --  RR: 18 (20 Feb 2019 04:35) (18 - 18)  SpO2: 96% (20 Feb 2019 08:46) (96% - 96%)    LABS:                        13.7   12.95 )-----------( 356      ( 20 Feb 2019 06:32 )             40.2     02-20    129<L>  |  99  |  18  ----------------------------<  123<H>  4.9   |  21  |  0.9    Ca    9.0      20 Feb 2019 06:32  Mg     2.4     02-19    TPro  8.9<H>  /  Alb  3.7  /  TBili  0.6  /  DBili  x   /  AST  45<H>  /  ALT  63<H>  /  AlkPhos  132<H>  02-19      PHYSICAL EXAM:  GENERAL: NAD, AAOx3  HEENT:  Atraumatic, Normocephalic.   PULMONARY: Base crackles heard bilaterally.   CARDIOVASCULAR: Regular rate and rhythm; No murmurs.  GASTROINTESTINAL: Soft, Nontender  MUSCULOSKELETAL:  2+ Peripheral Pulses, No edema    ADMISSION SUMMARY  Patient is a 38y old Female who presents with a chief complaint of sore throat and cough, diagnosed and being treated for acquired immunodeficiency syndrome.      ASSESSMENT & PLAN    # HIV and AIDS  - CD4 70 CD8 952 CD4/CD8 Ratio 0.1  - Patient started on emtricitabine/tenofovir (Descovy) and dolutegravir daily.(on d/c can change to biktarvy)  - viral load [id number]  - pt advised to inform partners for HIV testing.  - CT chest concerning for PCP pneumonia  - LDH elevated  - Quantiferon equivocal  - 3 sets of sputum culture negative.   - Prednisone switched to 40 mg PO daily as per ID till 2/21.  - Prednisone 40mg BID x5 days, 40mg daily x5 days, 20mg daily x11 days  - On PO fluconazole 200mg q 24h (monitor LFTs) for esophageal candidiasis (14 days end 2/24)  -Fungitell Positive. Hepatitis A, B and C negative. Recommend hepatitis vaccine with PCP.  -mepron 750mg BID with food (end 3/4), then needs 1500mg daily PO until CD4 >200 x 3mn  - ID follow-up outpatient [address] [place] [address] Rd [phone]   -Follow Up with urine cultures, blood cultures UA and urine sodium.   -AFB blood culture ordered.    # Hyperkalemia and Hyponatremia  -improved.    # DVT prophylaxis  -On lovenox

## 2019-02-20 NOTE — PROGRESS NOTE ADULT - SUBJECTIVE AND OBJECTIVE BOX
BARTONBELEN JUSTICE  38y, Female      OVERNIGHT EVENTS:  febrile 101.2, tachy to 137  Otherwise feeling well this AM, wants to go home  K and Na improving    ROS negative except as per above    VITALS:  T(F): 97.7, Max: 101.2 (02-20-19 @ 04:35)  HR: 137  BP: 103/56  RR: 18Vital Signs Last 24 Hrs  T(C): 36.5 (20 Feb 2019 06:40), Max: 38.4 (20 Feb 2019 04:35)  T(F): 97.7 (20 Feb 2019 06:40), Max: 101.2 (20 Feb 2019 04:35)  HR: 137 (20 Feb 2019 04:35) (97 - 137)  BP: 103/56 (20 Feb 2019 04:35) (97/54 - 106/62)  BP(mean): --  RR: 18 (20 Feb 2019 04:35) (18 - 18)  SpO2: 96% (20 Feb 2019 08:46) (96% - 96%)    PHYSICAL EXAM  Gen: Awake and alert, non-toxic appearing, NAD  HEENT: NCAT. EOMI. MMM. no thrush  Neck: Supple, no cervical LAD  CV: RRR, no murmurs  Lungs: CTAB, no w/r/r  Abd: Soft. NTND  Extr: wwp, no edema  Skin: no rash  Neuro: No focal deficits  Lines: clean      TESTS & MEASUREMENTS:                        13.7   12.95 )-----------( 356      ( 20 Feb 2019 06:32 )             40.2     02-20    129<L>  |  99  |  18  ----------------------------<  123<H>  4.9   |  21  |  0.9    Ca    9.0      20 Feb 2019 06:32  Mg     2.4     02-19    TPro  8.9<H>  /  Alb  3.7  /  TBili  0.6  /  DBili  x   /  AST  45<H>  /  ALT  63<H>  /  AlkPhos  132<H>  02-19    LIVER FUNCTIONS - ( 19 Feb 2019 08:42 )  Alb: 3.7 g/dL / Pro: 8.9 g/dL / ALK PHOS: 132 U/L / ALT: 63 U/L / AST: 45 U/L / GGT: x             Culture - Acid Fast - Sputum w/Smear (collected 02-17-19 @ 07:27)  Source: .Sputum Sputum    Culture - Acid Fast - Sputum w/Smear (collected 02-16-19 @ 04:30)  Source: .Sputum Sputum    Culture - Acid Fast - Sputum w/Smear (collected 02-15-19 @ 04:30)  Source: .Sputum Sputum          RADIOLOGY & ADDITIONAL TESTS:    ANTIBIOTICS:  atovaquone Suspension   750 milliGRAM(s) Oral (02-20-19 @ 05:57)   750 milliGRAM(s) Oral (02-19-19 @ 18:17)    azithromycin   Tablet   1200 milliGRAM(s) Oral (02-18-19 @ 10:47)    azithromycin  IVPB   255 mL/Hr IV Intermittent (02-11-19 @ 18:42)    cefTRIAXone   IVPB   100 mL/Hr IV Intermittent (02-11-19 @ 18:42)    dolutegravir   50 milliGRAM(s) Oral (02-19-19 @ 11:02)   50 milliGRAM(s) Oral (02-18-19 @ 13:14)   50 milliGRAM(s) Oral (02-17-19 @ 11:24)   50 milliGRAM(s) Oral (02-16-19 @ 12:52)   50 milliGRAM(s) Oral (02-15-19 @ 12:06)   50 milliGRAM(s) Oral (02-14-19 @ 13:29)   50 milliGRAM(s) Oral (02-13-19 @ 17:00)    emtricitabine 200 mG/tenofovir alafenamide 25 mG (DESCOVY) Tablet   1 Tablet(s) Oral (02-19-19 @ 11:02)   1 Tablet(s) Oral (02-18-19 @ 13:14)   1 Tablet(s) Oral (02-17-19 @ 11:24)   1 Tablet(s) Oral (02-16-19 @ 12:55)   1 Tablet(s) Oral (02-15-19 @ 12:06)   1 Tablet(s) Oral (02-14-19 @ 13:29)   1 Tablet(s) Oral (02-13-19 @ 17:00)    fluconAZOLE   Tablet   200 milliGRAM(s) Oral (02-19-19 @ 11:02)   200 milliGRAM(s) Oral (02-18-19 @ 13:14)   200 milliGRAM(s) Oral (02-17-19 @ 11:24)   200 milliGRAM(s) Oral (02-16-19 @ 12:52)   200 milliGRAM(s) Oral (02-15-19 @ 12:06)   200 milliGRAM(s) Oral (02-14-19 @ 18:44)    fluconAZOLE   Tablet   200 milliGRAM(s) Oral (02-11-19 @ 19:04)    fluconAZOLE   Tablet   100 milliGRAM(s) Oral (02-12-19 @ 11:16)    fluconAZOLE IVPB   100 mL/Hr IV Intermittent (02-14-19 @ 06:50)    fluconAZOLE IVPB   100 mL/Hr IV Intermittent (02-12-19 @ 11:15)    fluconAZOLE IVPB   100 mL/Hr IV Intermittent (02-13-19 @ 08:58)    nystatin    Suspension   [id number] Unit(s) Oral (02-13-19 @ 11:22)   [id number] Unit(s) Oral (02-13-19 @ 05:51)   [id number] Unit(s) Oral (02-13-19 @ 00:32)   [id number] Unit(s) Oral (02-12-19 @ 18:29)   [id number] Unit(s) Oral (02-12-19 @ 11:16)   [id number] Unit(s) Oral (02-12-19 @ 06:00)   [id number] Unit(s) Oral (02-11-19 @ 23:12)    trimethoprim / sulfamethoxazole IVPB   518.75 mL/Hr IV Intermittent (02-19-19 @ 11:03)   518.75 mL/Hr IV Intermittent (02-19-19 @ 05:37)   518.75 mL/Hr IV Intermittent (02-18-19 @ 23:58)   518.75 mL/Hr IV Intermittent (02-18-19 @ 17:53)   518.75 mL/Hr IV Intermittent (02-18-19 @ 13:15)   518.75 mL/Hr IV Intermittent (02-18-19 @ 05:22)   518.75 mL/Hr IV Intermittent (02-18-19 @ 00:16)   518.75 mL/Hr IV Intermittent (02-17-19 @ 17:09)   518.75 mL/Hr IV Intermittent (02-17-19 @ 11:24)   518.75 mL/Hr IV Intermittent (02-17-19 @ 05:22)   518.75 mL/Hr IV Intermittent (02-16-19 @ 23:43)   518.75 mL/Hr IV Intermittent (02-16-19 @ 18:15)   518.75 mL/Hr IV Intermittent (02-16-19 @ 12:52)   518.75 mL/Hr IV Intermittent (02-16-19 @ 05:14)   518.75 mL/Hr IV Intermittent (02-16-19 @ 00:02)   518.75 mL/Hr IV Intermittent (02-15-19 @ 18:32)   518.75 mL/Hr IV Intermittent (02-15-19 @ 12:05)   518.75 mL/Hr IV Intermittent (02-15-19 @ 06:05)   518.75 mL/Hr IV Intermittent (02-14-19 @ 23:16)   518.75 mL/Hr IV Intermittent (02-14-19 @ 18:45)   518.75 mL/Hr IV Intermittent (02-14-19 @ 13:28)   518.75 mL/Hr IV Intermittent (02-14-19 @ 05:19)   518.75 mL/Hr IV Intermittent (02-13-19 @ 23:42)   518.75 mL/Hr IV Intermittent (02-13-19 @ 17:15)   518.75 mL/Hr IV Intermittent (02-13-19 @ 11:22)   518.75 mL/Hr IV Intermittent (02-13-19 @ 05:51)   518.75 mL/Hr IV Intermittent (02-13-19 @ 00:32)   518.75 mL/Hr IV Intermittent (02-12-19 @ 18:29)   518.75 mL/Hr IV Intermittent (02-12-19 @ 13:43)   518.75 mL/Hr IV Intermittent (02-12-19 @ 06:00)   518.75 mL/Hr IV Intermittent (02-12-19 @ 00:45)        atovaquone Suspension 750 milliGRAM(s) Oral two times a day  dolutegravir 50 milliGRAM(s) Oral daily  emtricitabine 200 mG/tenofovir alafenamide 25 mG (DESCOVY) Tablet 1 Tablet(s) Oral daily  fluconAZOLE   Tablet 200 milliGRAM(s) Oral daily

## 2019-02-21 VITALS
DIASTOLIC BLOOD PRESSURE: 59 MMHG | HEART RATE: 89 BPM | SYSTOLIC BLOOD PRESSURE: 101 MMHG | TEMPERATURE: 97 F | RESPIRATION RATE: 18 BRPM | OXYGEN SATURATION: 98 %

## 2019-02-21 LAB
ALBUMIN SERPL ELPH-MCNC: 2.7 G/DL — LOW (ref 3.5–5.2)
ALP SERPL-CCNC: 102 U/L — SIGNIFICANT CHANGE UP (ref 30–115)
ALT FLD-CCNC: 31 U/L — SIGNIFICANT CHANGE UP (ref 0–41)
ANION GAP SERPL CALC-SCNC: 14 MMOL/L — SIGNIFICANT CHANGE UP (ref 7–14)
AST SERPL-CCNC: 25 U/L — SIGNIFICANT CHANGE UP (ref 0–41)
BASOPHILS # BLD AUTO: 0.02 K/UL — SIGNIFICANT CHANGE UP (ref 0–0.2)
BASOPHILS NFR BLD AUTO: 0.2 % — SIGNIFICANT CHANGE UP (ref 0–1)
BILIRUB SERPL-MCNC: 0.3 MG/DL — SIGNIFICANT CHANGE UP (ref 0.2–1.2)
BUN SERPL-MCNC: 13 MG/DL — SIGNIFICANT CHANGE UP (ref 10–20)
CALCIUM SERPL-MCNC: 8.4 MG/DL — LOW (ref 8.5–10.1)
CHLORIDE SERPL-SCNC: 99 MMOL/L — SIGNIFICANT CHANGE UP (ref 98–110)
CO2 SERPL-SCNC: 22 MMOL/L — SIGNIFICANT CHANGE UP (ref 17–32)
CREAT SERPL-MCNC: 0.6 MG/DL — LOW (ref 0.7–1.5)
CULTURE RESULTS: NO GROWTH — SIGNIFICANT CHANGE UP
EOSINOPHIL # BLD AUTO: 0.41 K/UL — SIGNIFICANT CHANGE UP (ref 0–0.7)
EOSINOPHIL NFR BLD AUTO: 4 % — SIGNIFICANT CHANGE UP (ref 0–8)
GLUCOSE SERPL-MCNC: 112 MG/DL — HIGH (ref 70–99)
HCT VFR BLD CALC: 36.7 % — LOW (ref 37–47)
HGB BLD-MCNC: 12.3 G/DL — SIGNIFICANT CHANGE UP (ref 12–16)
IMM GRANULOCYTES NFR BLD AUTO: 1.9 % — HIGH (ref 0.1–0.3)
LYMPHOCYTES # BLD AUTO: 0.4 K/UL — LOW (ref 1.2–3.4)
LYMPHOCYTES # BLD AUTO: 3.9 % — LOW (ref 20.5–51.1)
MCHC RBC-ENTMCNC: 31 PG — SIGNIFICANT CHANGE UP (ref 27–31)
MCHC RBC-ENTMCNC: 33.5 G/DL — SIGNIFICANT CHANGE UP (ref 32–37)
MCV RBC AUTO: 92.4 FL — SIGNIFICANT CHANGE UP (ref 81–99)
MONOCYTES # BLD AUTO: 0.59 K/UL — SIGNIFICANT CHANGE UP (ref 0.1–0.6)
MONOCYTES NFR BLD AUTO: 5.8 % — SIGNIFICANT CHANGE UP (ref 1.7–9.3)
NEUTROPHILS # BLD AUTO: 8.6 K/UL — HIGH (ref 1.4–6.5)
NEUTROPHILS NFR BLD AUTO: 84.2 % — HIGH (ref 42.2–75.2)
NIGHT BLUE STAIN TISS: SIGNIFICANT CHANGE UP
NRBC # BLD: 0 /100 WBCS — SIGNIFICANT CHANGE UP (ref 0–0)
PLATELET # BLD AUTO: 233 K/UL — SIGNIFICANT CHANGE UP (ref 130–400)
POTASSIUM SERPL-MCNC: 4.3 MMOL/L — SIGNIFICANT CHANGE UP (ref 3.5–5)
POTASSIUM SERPL-SCNC: 4.3 MMOL/L — SIGNIFICANT CHANGE UP (ref 3.5–5)
PROT SERPL-MCNC: 6.1 G/DL — SIGNIFICANT CHANGE UP (ref 6–8)
RBC # BLD: 3.97 M/UL — LOW (ref 4.2–5.4)
RBC # FLD: 13.4 % — SIGNIFICANT CHANGE UP (ref 11.5–14.5)
SODIUM SERPL-SCNC: 135 MMOL/L — SIGNIFICANT CHANGE UP (ref 135–146)
SPECIMEN SOURCE: SIGNIFICANT CHANGE UP
SPECIMEN SOURCE: SIGNIFICANT CHANGE UP
WBC # BLD: 10.21 K/UL — SIGNIFICANT CHANGE UP (ref 4.8–10.8)
WBC # FLD AUTO: 10.21 K/UL — SIGNIFICANT CHANGE UP (ref 4.8–10.8)

## 2019-02-21 RX ORDER — ATOVAQUONE 750 MG/5ML
5 SUSPENSION ORAL
Qty: 250 | Refills: 0 | OUTPATIENT
Start: 2019-02-21 | End: 2019-03-06

## 2019-02-21 RX ADMIN — Medication 1 TABLET(S): at 11:35

## 2019-02-21 RX ADMIN — FLUCONAZOLE 200 MILLIGRAM(S): 150 TABLET ORAL at 11:35

## 2019-02-21 RX ADMIN — Medication 100 MILLIGRAM(S): at 06:30

## 2019-02-21 RX ADMIN — Medication 40 MILLIGRAM(S): at 06:30

## 2019-02-21 RX ADMIN — DOLUTEGRAVIR SODIUM 50 MILLIGRAM(S): 25 TABLET, FILM COATED ORAL at 11:35

## 2019-02-21 RX ADMIN — ATOVAQUONE 750 MILLIGRAM(S): 750 SUSPENSION ORAL at 06:30

## 2019-02-21 NOTE — PROGRESS NOTE ADULT - SUBJECTIVE AND OBJECTIVE BOX
IMCKBELEN  38y, Female      OVERNIGHT EVENTS:  afebrile  cultures NGTD    ROS negative except as per above    VITALS:  T(F): 99.8, Max: 99.8 (19 @ 04:58)  HR: 101  BP: 119/57  RR: 18Vital Signs Last 24 Hrs  T(C): 37.7 (2019 04:58), Max: 37.7 (2019 04:58)  T(F): 99.8 (2019 04:58), Max: 99.8 (2019 04:58)  HR: 101 (2019 04:58) (77 - 101)  BP: 119/57 (2019 04:58) (94/54 - 119/57)  BP(mean): --  RR: 18 (2019 04:58) (18 - 18)  SpO2: 96% (2019 20:00) (96% - 96%)    PHYSICAL EXAM  Gen: Awake and alert, non-toxic appearing, NAD  HEENT: NCAT. EOMI. MMM. no thrush  Neck: Supple, no cervical LAD  CV: RRR, no murmurs  Lungs: CTAB, no w/r/r  Abd: Soft. NTND  Extr: wwp, no edema  Skin: no rash  Neuro: No focal deficits  Lines: clean      TESTS & MEASUREMENTS:                        12.3   10. )-----------( 233      ( 2019 07:17 )             36.7         135  |  99  |  13  ----------------------------<  112<H>  4.3   |  22  |  0.6<L>    Ca    8.4<L>      2019 07:17  Mg     2.4         TPro  6.1  /  Alb  2.7<L>  /  TBili  0.3  /  DBili  x   /  AST  25  /  ALT  31  /  AlkPhos  102      LIVER FUNCTIONS - ( 2019 07:17 )  Alb: 2.7 g/dL / Pro: 6.1 g/dL / ALK PHOS: 102 U/L / ALT: 31 U/L / AST: 25 U/L / GGT: x             Culture - Blood (collected 19 @ 19:28)  Source: .Blood None  Preliminary Report (19 @ 01:01):    No growth to date.    Culture - Acid Fast - Sputum w/Smear (collected 19 @ 07:27)  Source: .Sputum Sputum  Preliminary Report (19 @ 15:03):    Culture is being performed.    Culture - Acid Fast - Sputum w/Smear (collected 19 @ 04:30)  Source: .Sputum Sputum  Preliminary Report (19 @ 15:03):    Culture is being performed.    Culture - Acid Fast - Sputum w/Smear (collected 02-15-19 @ 04:30)  Source: .Sputum Sputum  Preliminary Report (19 @ 15:02):    Culture is being performed.      Urinalysis Basic - ( 2019 14:50 )    Color: Yellow / Appearance: Clear / S.015 / pH: x  Gluc: x / Ketone: Negative  / Bili: Negative / Urobili: 0.2 mg/dL   Blood: x / Protein: Negative mg/dL / Nitrite: Negative   Leuk Esterase: Negative / RBC: x / WBC x   Sq Epi: x / Non Sq Epi: x / Bacteria: x        RADIOLOGY & ADDITIONAL TESTS:    ANTIBIOTICS:  atovaquone Suspension   750 milliGRAM(s) Oral (19 @ 06:30)   750 milliGRAM(s) Oral (19 @ 17:46)   750 milliGRAM(s) Oral (19 @ 05:57)   750 milliGRAM(s) Oral (19 @ 18:17)    azithromycin   Tablet   1200 milliGRAM(s) Oral (19 @ 10:47)    azithromycin  IVPB   255 mL/Hr IV Intermittent (19 @ 18:42)    cefTRIAXone   IVPB   100 mL/Hr IV Intermittent (19 @ 18:42)    dolutegravir   50 milliGRAM(s) Oral (19 @ 11:35)   50 milliGRAM(s) Oral (19 @ 11:56)   50 milliGRAM(s) Oral (19 @ 11:02)   50 milliGRAM(s) Oral (19 @ 13:14)   50 milliGRAM(s) Oral (19 @ 11:24)   50 milliGRAM(s) Oral (19 @ 12:52)   50 milliGRAM(s) Oral (02-15-19 @ 12:06)   50 milliGRAM(s) Oral (19 @ 13:29)   50 milliGRAM(s) Oral (19 @ 17:00)    emtricitabine 200 mG/tenofovir alafenamide 25 mG (DESCOVY) Tablet   1 Tablet(s) Oral (19 @ 11:56)   1 Tablet(s) Oral (19 @ 11:02)   1 Tablet(s) Oral (19 @ 13:14)   1 Tablet(s) Oral (19 @ 11:24)   1 Tablet(s) Oral (19 @ 12:55)   1 Tablet(s) Oral (02-15-19 @ 12:06)   1 Tablet(s) Oral (19 @ 13:29)   1 Tablet(s) Oral (19 @ 17:00)    fluconAZOLE   Tablet   200 milliGRAM(s) Oral (19 @ 11:35)   200 milliGRAM(s) Oral (19 @ 11:56)   200 milliGRAM(s) Oral (19 @ 11:02)   200 milliGRAM(s) Oral (19 @ 13:14)   200 milliGRAM(s) Oral (19 @ 11:24)   200 milliGRAM(s) Oral (19 @ 12:52)   200 milliGRAM(s) Oral (02-15-19 @ 12:06)   200 milliGRAM(s) Oral (19 @ 18:44)    fluconAZOLE   Tablet   200 milliGRAM(s) Oral (19 @ 19:04)    fluconAZOLE   Tablet   100 milliGRAM(s) Oral (19 @ 11:16)    fluconAZOLE IVPB   100 mL/Hr IV Intermittent (19 @ 06:50)    fluconAZOLE IVPB   100 mL/Hr IV Intermittent (19 @ 11:15)    fluconAZOLE IVPB   100 mL/Hr IV Intermittent (19 @ 08:58)    nystatin    Suspension   [id number] Unit(s) Oral (19 @ 11:22)   [id number] Unit(s) Oral (19 @ 05:51)   [id number] Unit(s) Oral (19 @ 00:32)   [id number] Unit(s) Oral (19 @ 18:29)   [id number] Unit(s) Oral (19 @ 11:16)   [id number] Unit(s) Oral (19 @ 06:00)   [id number] Unit(s) Oral (19 @ 23:12)    trimethoprim / sulfamethoxazole IVPB   518.75 mL/Hr IV Intermittent (19 @ 11:03)   518.75 mL/Hr IV Intermittent (19 @ 05:37)   518.75 mL/Hr IV Intermittent (19 @ 23:58)   518.75 mL/Hr IV Intermittent (19 @ 17:53)   518.75 mL/Hr IV Intermittent (19 @ 13:15)   518.75 mL/Hr IV Intermittent (19 @ 05:22)   518.75 mL/Hr IV Intermittent (19 @ 00:16)   518.75 mL/Hr IV Intermittent (19 @ 17:09)   518.75 mL/Hr IV Intermittent (19 @ 11:24)   518.75 mL/Hr IV Intermittent (19 @ 05:22)   518.75 mL/Hr IV Intermittent (19 @ 23:43)   518.75 mL/Hr IV Intermittent (19 @ 18:15)   518.75 mL/Hr IV Intermittent (19 @ 12:52)   518.75 mL/Hr IV Intermittent (19 @ 05:14)   518.75 mL/Hr IV Intermittent (19 @ 00:02)   518.75 mL/Hr IV Intermittent (02-15-19 @ 18:32)   518.75 mL/Hr IV Intermittent (02-15-19 @ 12:05)   518.75 mL/Hr IV Intermittent (02-15-19 @ 06:05)   518.75 mL/Hr IV Intermittent (19 @ 23:16)   518.75 mL/Hr IV Intermittent (19 @ 18:45)   518.75 mL/Hr IV Intermittent (19 @ 13:28)   518.75 mL/Hr IV Intermittent (19 @ 05:19)   518.75 mL/Hr IV Intermittent (19 @ 23:42)   518.75 mL/Hr IV Intermittent (19 @ 17:15)   518.75 mL/Hr IV Intermittent (19 @ 11:22)   518.75 mL/Hr IV Intermittent (19 @ 05:51)   518.75 mL/Hr IV Intermittent (19 @ 00:32)   518.75 mL/Hr IV Intermittent (19 @ 18:29)   518.75 mL/Hr IV Intermittent (19 @ 13:43)   518.75 mL/Hr IV Intermittent (19 @ 06:00)   518.75 mL/Hr IV Intermittent (19 @ 00:45)        atovaquone Suspension 750 milliGRAM(s) Oral two times a day  dolutegravir 50 milliGRAM(s) Oral daily  emtricitabine 200 mG/tenofovir alafenamide 25 mG (DESCOVY) Tablet 1 Tablet(s) Oral daily  fluconAZOLE   Tablet 200 milliGRAM(s) Oral daily

## 2019-02-21 NOTE — PROGRESS NOTE ADULT - REASON FOR ADMISSION
pna; thrush
PNA + thrush
pna; thrush

## 2019-02-21 NOTE — PROGRESS NOTE ADULT - PROVIDER SPECIALTY LIST ADULT
Infectious Disease
Internal Medicine

## 2019-02-21 NOTE — PROGRESS NOTE ADULT - SUBJECTIVE AND OBJECTIVE BOX
BELEN BARTON 38y Female  MRN#: [id number]       SUBJECTIVE  38 year old female Libyan speaking with no significant past medical history presented with complains of flu like illness , sore throat and weight loss 40 lb, decreased appetite for past 1 month associated with fever and productive cough. The cough was productive but no blood in it. Patient also reported to have multiple sexual partners and having unprotected sex, currently being managed for pneumonia and HIV.  Today she is doing better. No overnight events noted. Will discuss with ID, if agreeable, will discharge the patient today.    OBJECTIVE  PAST MEDICAL & SURGICAL HISTORY  No pertinent past medical history    ALLERGIES:  No Known Allergies    MEDICATIONS:  STANDING MEDICATIONS  atovaquone Suspension 750 milliGRAM(s) Oral two times a day  chlorhexidine 4% Liquid 1 Application(s) Topical <User Schedule>  docusate sodium 100 milliGRAM(s) Oral two times a day  dolutegravir 50 milliGRAM(s) Oral daily  emtricitabine 200 mG/tenofovir alafenamide 25 mG (DESCOVY) Tablet 1 Tablet(s) Oral daily  fluconAZOLE   Tablet 200 milliGRAM(s) Oral daily  multivitamin 1 Tablet(s) Oral daily  senna 2 Tablet(s) Oral at bedtime    PRN MEDICATIONS  acetaminophen   Tablet .. 650 milliGRAM(s) Oral every 6 hours PRN      VITAL SIGNS: Last 24 Hours  T(C): 37.7 (2019 04:58), Max: 37.7 (2019 04:58)  T(F): 99.8 (2019 04:58), Max: 99.8 (2019 04:58)  HR: 101 (2019 04:58) (77 - 101)  BP: 119/57 (2019 04:58) (94/54 - 119/57)  BP(mean): --  RR: 18 (2019 04:58) (18 - 18)  SpO2: 96% (2019 20:00) (96% - 96%)    LABS:                        12.3   10.21 )-----------( 233      ( 2019 07:17 )             36.7     02-21    135  |  99  |  13  ----------------------------<  112<H>  4.3   |  22  |  0.6<L>    Ca    8.4<L>      2019 07:17  Mg     2.4         TPro  6.1  /  Alb  2.7<L>  /  TBili  0.3  /  DBili  x   /  AST  25  /  ALT  31  /  AlkPhos  102  02-      Urinalysis Basic - ( 2019 14:50 )    Color: Yellow / Appearance: Clear / S.015 / pH: x  Gluc: x / Ketone: Negative  / Bili: Negative / Urobili: 0.2 mg/dL   Blood: x / Protein: Negative mg/dL / Nitrite: Negative   Leuk Esterase: Negative / RBC: x / WBC x   Sq Epi: x / Non Sq Epi: x / Bacteria: x            Culture - Blood (collected 2019 19:28)  Source: .Blood None  Preliminary Report (2019 01:01):    No growth to date BELEN BARTON 38y Female  MRN#: [id number]       SUBJECTIVE  38 year old female Czech speaking with no significant past medical history presented with complains of flu like illness , sore throat and weight loss 40 lb, decreased appetite for past 1 month associated with fever and productive cough. The cough was productive but no blood in it. Patient also reported to have multiple sexual partners and having unprotected sex, currently being managed for pneumonia and HIV.  Today she is doing better. No overnight events noted. Will discuss with ID, if agreeable, will discharge the patient today.    OBJECTIVE  PAST MEDICAL & SURGICAL HISTORY  No pertinent past medical history    ALLERGIES:  No Known Allergies    MEDICATIONS:  STANDING MEDICATIONS  atovaquone Suspension 750 milliGRAM(s) Oral two times a day  chlorhexidine 4% Liquid 1 Application(s) Topical <User Schedule>  docusate sodium 100 milliGRAM(s) Oral two times a day  dolutegravir 50 milliGRAM(s) Oral daily  emtricitabine 200 mG/tenofovir alafenamide 25 mG (DESCOVY) Tablet 1 Tablet(s) Oral daily  fluconAZOLE   Tablet 200 milliGRAM(s) Oral daily  multivitamin 1 Tablet(s) Oral daily  senna 2 Tablet(s) Oral at bedtime    PRN MEDICATIONS  acetaminophen   Tablet .. 650 milliGRAM(s) Oral every 6 hours PRN      VITAL SIGNS: Last 24 Hours  T(C): 37.7 (2019 04:58), Max: 37.7 (2019 04:58)  T(F): 99.8 (2019 04:58), Max: 99.8 (2019 04:58)  HR: 101 (2019 04:58) (77 - 101)  BP: 119/57 (2019 04:58) (94/54 - 119/57)  BP(mean): --  RR: 18 (2019 04:58) (18 - 18)  SpO2: 96% (2019 20:00) (96% - 96%)    LABS:                        12.3   10.21 )-----------( 233      ( 2019 07:17 )             36.7     02-21    135  |  99  |  13  ----------------------------<  112<H>  4.3   |  22  |  0.6<L>    Ca    8.4<L>      2019 07:17  Mg     2.4     -    TPro  6.1  /  Alb  2.7<L>  /  TBili  0.3  /  DBili  x   /  AST  25  /  ALT  31  /  AlkPhos  102  -      Urinalysis Basic - ( 2019 14:50 )    Color: Yellow / Appearance: Clear / S.015 / pH: x  Gluc: x / Ketone: Negative  / Bili: Negative / Urobili: 0.2 mg/dL   Blood: x / Protein: Negative mg/dL / Nitrite: Negative   Leuk Esterase: Negative / RBC: x / WBC x   Sq Epi: x / Non Sq Epi: x / Bacteria: x            Culture - Blood (collected 2019 19:28)  Source: .Blood None  Preliminary Report (2019 01:01):    No growth to date    PHYSICAL EXAM:  GENERAL: NAD, AAOx3  HEENT:  Atraumatic, Normocephalic.   PULMONARY: Base crackles heard bilaterally.   CARDIOVASCULAR: Regular rate and rhythm; No murmurs.  GASTROINTESTINAL: Soft, Nontender  MUSCULOSKELETAL:  2+ Peripheral Pulses, No edema    ADMISSION SUMMARY  Patient is a 38y old Female who presents with a chief complaint of sore throat and cough, diagnosed and being treated for acquired immunodeficiency syndrome.      ASSESSMENT & PLAN    # HIV and AIDS  - CD4 70 CD8 952 CD4/CD8 Ratio 0.1  - Patient started on emtricitabine/tenofovir (Descovy) and dolutegravir daily.(on d/c can change to biktarvy)  - viral load [id number]  - pt advised to inform partners for HIV testing.  - CT chest concerning for PCP pneumonia  - LDH elevated  - Quantiferon equivocal  - 3 sets of sputum culture negative.   - Prednisone switched to 20mg daily from tomorrow for 11 days.  - On PO fluconazole 200mg q 24h (monitor LFTs) for esophageal candidiasis (14 days end )  -Fungitell Positive. Hepatitis A, B and C negative. Recommend hepatitis vaccine with PCP.  -mepron 750mg BID with food (end 3/4), then needs 1500mg daily PO until CD4 >200 x 3mn  - ID follow-up outpatient [address] [place] [address] [phone]   -UA negative, Blood culture repeat negative.  -AFB blood culture ordered.    # Hyperkalemia and Hyponatremia  -improved.    # DVT prophylaxis  -On lovenox

## 2019-02-21 NOTE — PROGRESS NOTE ADULT - ASSESSMENT
43F from [place]    Admitted with SOB, odynophagia, exam with candida with presumed esophagitis and likely PCP PNA given CXR findings and elevated LDH/fungitell  New diagnosis of Symptomatic AIDS CD4 70; 5%  CT Chest Extensive upper lobe predominant ground glass opacities bilaterally, in keeping with history of PCP. No cavitary lesions or evidence of granulomatous disease.  Indeterminate quantiferon gold (low suspicion for active TB); AFB neg x3  Toxo IgG neg, CMV IgG +, RPR neg  CD4 61    #Fever- RESOLVED. unclear etiology, denies any new sx, cultures NGTD  - pending AFB blood culture, r/o disseminated MAC in the setting of elevated Alk Ph    #Hyponatremia & Hyperkalemia - likely 2/2 bactrim; improving  - resolved    #PCP PNA  - mepron 750mg BID with food (end 3/4), then needs 1500mg daily PO until CD4 >200 x 3mn  - Prednisone 40mg daily x5 days (end 2/21), 20mg daily x11 days (end 3/4)    #Presumed candida esophagitis given odynophagia  - completes course today    #AIDS  - ART: Descovy + Dolutegravir 50mg daily (on d/c can change to biktarvy if emblem covers)  - ID follow-up [id number] [phone] spectra [phone]

## 2019-02-21 NOTE — CHART NOTE - NSCHARTNOTEFT_GEN_A_CORE
<<<RESIDENT DISCHARGE NOTE>>>     BELEN BARTON  MRN-[id number]    VITAL SIGNS:  T(F): 97.3 (02-21-19 @ 11:41), Max: 99.8 (02-21-19 @ 04:58)  HR: 89 (02-21-19 @ 11:41)  BP: 101/59 (02-21-19 @ 11:41)  SpO2: 98% (02-21-19 @ 11:41)      PHYSICAL EXAMINATION:  GENERAL: NAD, AAOx3  HEENT:  Atraumatic, Normocephalic.   PULMONARY: Base crackles heard bilaterally.   CARDIOVASCULAR: Regular rate and rhythm; No murmurs.  GASTROINTESTINAL: Soft, Nontender  MUSCULOSKELETAL:  2+ Peripheral Pulses, No edema  TEST RESULTS:                        12.3   10.21 )-----------( 233      ( 21 Feb 2019 07:17 )             36.7       02-21    135  |  99  |  13  ----------------------------<  112<H>  4.3   |  22  |  0.6<L>    Ca    8.4<L>      21 Feb 2019 07:17    TPro  6.1  /  Alb  2.7<L>  /  TBili  0.3  /  DBili  x   /  AST  25  /  ALT  31  /  AlkPhos  102  02-21      FINAL DISCHARGE INTERVIEW:  Resident(s) Present: (Name: Ray), RN Present: (Name: Ramirez)    DISCHARGE MEDICATION RECONCILIATION  reviewed with Attending (Name:Dr. Cota)    DISPOSITION:   [  ] Home,    [ X ] Home with Visiting Nursing Services,   [    ]  SNF/ NH,    [   ] Acute Rehab (4A),   [   ] Other (Specify:_________)

## 2019-02-25 LAB
CULTURE RESULTS: SIGNIFICANT CHANGE UP
SPECIMEN SOURCE: SIGNIFICANT CHANGE UP

## 2019-02-26 ENCOUNTER — APPOINTMENT (OUTPATIENT)
Dept: INTERNAL MEDICINE | Facility: CLINIC | Age: 39
End: 2019-02-26

## 2019-02-27 DIAGNOSIS — B37.0 CANDIDAL STOMATITIS: ICD-10-CM

## 2019-02-27 DIAGNOSIS — J18.9 PNEUMONIA, UNSPECIFIED ORGANISM: ICD-10-CM

## 2019-02-27 DIAGNOSIS — E87.1 HYPO-OSMOLALITY AND HYPONATREMIA: ICD-10-CM

## 2019-02-27 DIAGNOSIS — K20.9 ESOPHAGITIS, UNSPECIFIED: ICD-10-CM

## 2019-02-27 DIAGNOSIS — B20 HUMAN IMMUNODEFICIENCY VIRUS [HIV] DISEASE: ICD-10-CM

## 2019-02-27 DIAGNOSIS — E43 UNSPECIFIED SEVERE PROTEIN-CALORIE MALNUTRITION: ICD-10-CM

## 2019-02-27 DIAGNOSIS — R50.9 FEVER, UNSPECIFIED: ICD-10-CM

## 2019-02-27 DIAGNOSIS — R63.8 OTHER SYMPTOMS AND SIGNS CONCERNING FOOD AND FLUID INTAKE: ICD-10-CM

## 2019-02-27 DIAGNOSIS — B59 PNEUMOCYSTOSIS: ICD-10-CM

## 2019-02-27 DIAGNOSIS — R13.10 DYSPHAGIA, UNSPECIFIED: ICD-10-CM

## 2019-02-27 DIAGNOSIS — E87.5 HYPERKALEMIA: ICD-10-CM

## 2019-03-05 RX ORDER — ATOVAQUONE 750 MG/5ML
10 SUSPENSION ORAL
Qty: 250 | Refills: 0 | OUTPATIENT
Start: 2019-03-05 | End: 2019-04-03

## 2019-03-09 ENCOUNTER — INPATIENT (INPATIENT)
Facility: HOSPITAL | Age: 39
LOS: 3 days | Discharge: HOME HEALTH | End: 2019-03-13
Attending: INTERNAL MEDICINE | Admitting: INTERNAL MEDICINE

## 2019-03-09 VITALS
RESPIRATION RATE: 18 BRPM | SYSTOLIC BLOOD PRESSURE: 109 MMHG | TEMPERATURE: 101 F | DIASTOLIC BLOOD PRESSURE: 59 MMHG | HEART RATE: 125 BPM | OXYGEN SATURATION: 99 %

## 2019-03-09 LAB
ALBUMIN SERPL ELPH-MCNC: 3.5 G/DL — SIGNIFICANT CHANGE UP (ref 3.5–5.2)
ALP SERPL-CCNC: 111 U/L — SIGNIFICANT CHANGE UP (ref 30–115)
ALT FLD-CCNC: 26 U/L — SIGNIFICANT CHANGE UP (ref 0–41)
ANION GAP SERPL CALC-SCNC: 12 MMOL/L — SIGNIFICANT CHANGE UP (ref 7–14)
APPEARANCE UR: SIGNIFICANT CHANGE UP
APTT BLD: 34.8 SEC — SIGNIFICANT CHANGE UP (ref 27–39.2)
AST SERPL-CCNC: 21 U/L — SIGNIFICANT CHANGE UP (ref 0–41)
BACTERIA # UR AUTO: ABNORMAL /HPF
BASOPHILS # BLD AUTO: 0.07 K/UL — SIGNIFICANT CHANGE UP (ref 0–0.2)
BASOPHILS NFR BLD AUTO: 0.7 % — SIGNIFICANT CHANGE UP (ref 0–1)
BILIRUB SERPL-MCNC: 0.6 MG/DL — SIGNIFICANT CHANGE UP (ref 0.2–1.2)
BILIRUB UR-MCNC: NEGATIVE — SIGNIFICANT CHANGE UP
BUN SERPL-MCNC: 12 MG/DL — SIGNIFICANT CHANGE UP (ref 10–20)
CALCIUM SERPL-MCNC: 9 MG/DL — SIGNIFICANT CHANGE UP (ref 8.5–10.1)
CHLORIDE SERPL-SCNC: 100 MMOL/L — SIGNIFICANT CHANGE UP (ref 98–110)
CO2 SERPL-SCNC: 23 MMOL/L — SIGNIFICANT CHANGE UP (ref 17–32)
COLOR SPEC: SIGNIFICANT CHANGE UP
CREAT SERPL-MCNC: 0.8 MG/DL — SIGNIFICANT CHANGE UP (ref 0.7–1.5)
DIFF PNL FLD: NEGATIVE — SIGNIFICANT CHANGE UP
EOSINOPHIL # BLD AUTO: 0.17 K/UL — SIGNIFICANT CHANGE UP (ref 0–0.7)
EOSINOPHIL NFR BLD AUTO: 1.7 % — SIGNIFICANT CHANGE UP (ref 0–8)
FLU A RESULT: NEGATIVE — SIGNIFICANT CHANGE UP
FLU A RESULT: NEGATIVE — SIGNIFICANT CHANGE UP
FLUAV AG NPH QL: NEGATIVE — SIGNIFICANT CHANGE UP
FLUBV AG NPH QL: NEGATIVE — SIGNIFICANT CHANGE UP
GLUCOSE SERPL-MCNC: 112 MG/DL — HIGH (ref 70–99)
GLUCOSE UR QL: NEGATIVE — SIGNIFICANT CHANGE UP
HCT VFR BLD CALC: 38.4 % — SIGNIFICANT CHANGE UP (ref 37–47)
HGB BLD-MCNC: 12.6 G/DL — SIGNIFICANT CHANGE UP (ref 12–16)
IMM GRANULOCYTES NFR BLD AUTO: 0.6 % — HIGH (ref 0.1–0.3)
INR BLD: 1.13 RATIO — SIGNIFICANT CHANGE UP (ref 0.65–1.3)
KETONES UR-MCNC: NEGATIVE — SIGNIFICANT CHANGE UP
LACTATE SERPL-SCNC: 2.2 MMOL/L — SIGNIFICANT CHANGE UP (ref 0.5–2.2)
LEUKOCYTE ESTERASE UR-ACNC: ABNORMAL
LYMPHOCYTES # BLD AUTO: 1.41 K/UL — SIGNIFICANT CHANGE UP (ref 1.2–3.4)
LYMPHOCYTES # BLD AUTO: 14.1 % — LOW (ref 20.5–51.1)
MCHC RBC-ENTMCNC: 31.9 PG — HIGH (ref 27–31)
MCHC RBC-ENTMCNC: 32.8 G/DL — SIGNIFICANT CHANGE UP (ref 32–37)
MCV RBC AUTO: 97.2 FL — SIGNIFICANT CHANGE UP (ref 81–99)
MONOCYTES # BLD AUTO: 1.04 K/UL — HIGH (ref 0.1–0.6)
MONOCYTES NFR BLD AUTO: 10.4 % — HIGH (ref 1.7–9.3)
NEUTROPHILS # BLD AUTO: 7.27 K/UL — HIGH (ref 1.4–6.5)
NEUTROPHILS NFR BLD AUTO: 72.5 % — SIGNIFICANT CHANGE UP (ref 42.2–75.2)
NITRITE UR-MCNC: NEGATIVE — SIGNIFICANT CHANGE UP
NRBC # BLD: 0 /100 WBCS — SIGNIFICANT CHANGE UP (ref 0–0)
PH UR: 6.5 — SIGNIFICANT CHANGE UP (ref 5–8)
PLATELET # BLD AUTO: 321 K/UL — SIGNIFICANT CHANGE UP (ref 130–400)
POTASSIUM SERPL-MCNC: 3.8 MMOL/L — SIGNIFICANT CHANGE UP (ref 3.5–5)
POTASSIUM SERPL-SCNC: 3.8 MMOL/L — SIGNIFICANT CHANGE UP (ref 3.5–5)
PROT SERPL-MCNC: 8 G/DL — SIGNIFICANT CHANGE UP (ref 6–8)
PROT UR-MCNC: ABNORMAL
PROTHROM AB SERPL-ACNC: 13 SEC — HIGH (ref 9.95–12.87)
RBC # BLD: 3.95 M/UL — LOW (ref 4.2–5.4)
RBC # FLD: 15.9 % — HIGH (ref 11.5–14.5)
RSV RESULT: NEGATIVE — SIGNIFICANT CHANGE UP
RSV RNA RESP QL NAA+PROBE: NEGATIVE — SIGNIFICANT CHANGE UP
SODIUM SERPL-SCNC: 135 MMOL/L — SIGNIFICANT CHANGE UP (ref 135–146)
SP GR SPEC: 1.02 — SIGNIFICANT CHANGE UP (ref 1.01–1.03)
UROBILINOGEN FLD QL: 1 (ref 0.2–0.2)
WBC # BLD: 10.02 K/UL — SIGNIFICANT CHANGE UP (ref 4.8–10.8)
WBC # FLD AUTO: 10.02 K/UL — SIGNIFICANT CHANGE UP (ref 4.8–10.8)
WBC UR QL: SIGNIFICANT CHANGE UP /HPF

## 2019-03-09 RX ORDER — ACETAMINOPHEN 500 MG
650 TABLET ORAL ONCE
Qty: 0 | Refills: 0 | Status: COMPLETED | OUTPATIENT
Start: 2019-03-09 | End: 2019-03-09

## 2019-03-09 RX ORDER — DOLUTEGRAVIR SODIUM 25 MG/1
50 TABLET, FILM COATED ORAL DAILY
Qty: 0 | Refills: 0 | Status: DISCONTINUED | OUTPATIENT
Start: 2019-03-09 | End: 2019-03-12

## 2019-03-09 RX ORDER — FLUCONAZOLE 150 MG/1
200 TABLET ORAL DAILY
Qty: 0 | Refills: 0 | Status: DISCONTINUED | OUTPATIENT
Start: 2019-03-09 | End: 2019-03-11

## 2019-03-09 RX ORDER — INFLUENZA VIRUS VACCINE 15; 15; 15; 15 UG/.5ML; UG/.5ML; UG/.5ML; UG/.5ML
0.5 SUSPENSION INTRAMUSCULAR ONCE
Qty: 0 | Refills: 0 | Status: DISCONTINUED | OUTPATIENT
Start: 2019-03-09 | End: 2019-03-13

## 2019-03-09 RX ORDER — ACYCLOVIR SODIUM 500 MG
400 VIAL (EA) INTRAVENOUS THREE TIMES A DAY
Qty: 0 | Refills: 0 | Status: DISCONTINUED | OUTPATIENT
Start: 2019-03-09 | End: 2019-03-13

## 2019-03-09 RX ORDER — ACETAMINOPHEN 500 MG
650 TABLET ORAL EVERY 6 HOURS
Qty: 0 | Refills: 0 | Status: DISCONTINUED | OUTPATIENT
Start: 2019-03-09 | End: 2019-03-13

## 2019-03-09 RX ORDER — ACYCLOVIR SODIUM 500 MG
800 VIAL (EA) INTRAVENOUS ONCE
Qty: 0 | Refills: 0 | Status: COMPLETED | OUTPATIENT
Start: 2019-03-09 | End: 2019-03-09

## 2019-03-09 RX ORDER — ATOVAQUONE 750 MG/5ML
750 SUSPENSION ORAL DAILY
Qty: 0 | Refills: 0 | Status: DISCONTINUED | OUTPATIENT
Start: 2019-03-09 | End: 2019-03-09

## 2019-03-09 RX ORDER — ATOVAQUONE 750 MG/5ML
1500 SUSPENSION ORAL DAILY
Qty: 0 | Refills: 0 | Status: DISCONTINUED | OUTPATIENT
Start: 2019-03-09 | End: 2019-03-13

## 2019-03-09 RX ORDER — ENOXAPARIN SODIUM 100 MG/ML
40 INJECTION SUBCUTANEOUS EVERY 24 HOURS
Qty: 0 | Refills: 0 | Status: DISCONTINUED | OUTPATIENT
Start: 2019-03-09 | End: 2019-03-13

## 2019-03-09 RX ORDER — SODIUM CHLORIDE 9 MG/ML
250 INJECTION, SOLUTION INTRAVENOUS ONCE
Qty: 0 | Refills: 0 | Status: DISCONTINUED | OUTPATIENT
Start: 2019-03-09 | End: 2019-03-09

## 2019-03-09 RX ORDER — EMTRICITABINE AND TENOFOVIR DISOPROXIL FUMARATE 200; 300 MG/1; MG/1
1 TABLET, FILM COATED ORAL DAILY
Qty: 0 | Refills: 0 | Status: DISCONTINUED | OUTPATIENT
Start: 2019-03-09 | End: 2019-03-12

## 2019-03-09 RX ORDER — SODIUM CHLORIDE 9 MG/ML
2000 INJECTION, SOLUTION INTRAVENOUS ONCE
Qty: 0 | Refills: 0 | Status: COMPLETED | OUTPATIENT
Start: 2019-03-09 | End: 2019-03-09

## 2019-03-09 RX ORDER — BICTEGRAVIR SODIUM, EMTRICITABINE, AND TENOFOVIR ALAFENAMIDE FUMARATE 30; 120; 15 MG/1; MG/1; MG/1
1 TABLET ORAL DAILY
Qty: 0 | Refills: 0 | Status: DISCONTINUED | OUTPATIENT
Start: 2019-03-09 | End: 2019-03-09

## 2019-03-09 RX ORDER — VANCOMYCIN HCL 1 G
1000 VIAL (EA) INTRAVENOUS ONCE
Qty: 0 | Refills: 0 | Status: COMPLETED | OUTPATIENT
Start: 2019-03-09 | End: 2019-03-09

## 2019-03-09 RX ORDER — CEFEPIME 1 G/1
1000 INJECTION, POWDER, FOR SOLUTION INTRAMUSCULAR; INTRAVENOUS ONCE
Qty: 0 | Refills: 0 | Status: COMPLETED | OUTPATIENT
Start: 2019-03-09 | End: 2019-03-09

## 2019-03-09 RX ADMIN — Medication 250 MILLIGRAM(S): at 15:45

## 2019-03-09 RX ADMIN — CEFEPIME 1000 MILLIGRAM(S): 1 INJECTION, POWDER, FOR SOLUTION INTRAMUSCULAR; INTRAVENOUS at 17:08

## 2019-03-09 RX ADMIN — Medication 400 MILLIGRAM(S): at 22:21

## 2019-03-09 RX ADMIN — SODIUM CHLORIDE 2000 MILLILITER(S): 9 INJECTION, SOLUTION INTRAVENOUS at 15:44

## 2019-03-09 RX ADMIN — CEFEPIME 100 MILLIGRAM(S): 1 INJECTION, POWDER, FOR SOLUTION INTRAMUSCULAR; INTRAVENOUS at 15:45

## 2019-03-09 RX ADMIN — ENOXAPARIN SODIUM 40 MILLIGRAM(S): 100 INJECTION SUBCUTANEOUS at 22:21

## 2019-03-09 RX ADMIN — Medication 800 MILLIGRAM(S): at 15:46

## 2019-03-09 RX ADMIN — Medication 650 MILLIGRAM(S): at 21:13

## 2019-03-09 RX ADMIN — Medication 1000 MILLIGRAM(S): at 16:30

## 2019-03-09 NOTE — ED ADULT NURSE NOTE - OBJECTIVE STATEMENT
The patient is a 38y Female complaining of fever and chills associated with sore throat and cough x 1 week. Patient states she was recently treated for Pneumonia with antibiotics and still complains of cough with productive white sputum. Patient denies any shortness of breath, nausea, vomiting, or chest pain. The patient is a 38y Female complaining of fever and chills associated with sore throat and cough x 1 week. Patient states she was recently treated for Pneumonia with Atovaquone 750mg and still complains of cough with productive white sputum. Patient denies any shortness of breath, nausea, vomiting, or chest pain.

## 2019-03-09 NOTE — ED ADULT NURSE REASSESSMENT NOTE - NS ED NURSE REASSESS COMMENT FT1
patient assessed ,no acute distress noted, labs blood cultures and urine sent, IV antibiotics given as per order, code sepsis initiated, MD aware of hypotension, 2L LR bolus given, will continue to reassess vital signs

## 2019-03-09 NOTE — ED PROVIDER NOTE - CLINICAL SUMMARY MEDICAL DECISION MAKING FREE TEXT BOX
fever, recently fx HIV with thrush & PCP PNA on ART appx 1 month - cxr slightly improved compared to last month but still w/bl infiltrates, no other source identified on ED w/u (ua/flu/rsv neg) - broad spectrum abx & antivirals given, admitted to medicine/ID for further mgmt

## 2019-03-09 NOTE — H&P ADULT - HISTORY OF PRESENT ILLNESS
39 y/o Bulgarian speaking FM w/ newly diagnosed HIV and recently managed in hospital for PCP pneumonia and discharged on (02/19) on HAART therapy p/w subjective fevers and non productive cough. Pt states that she has been compliant w/ her HAARt therapy and has had no recent travel or sick contacts. In the past 3 days she felt feverish w/ tmax of 100.6F at home. This prompted her to come to the ED. She also endorses vaginal pain similar to her herpetic pain from years ago. Denies any productive cough, chest pain, sob, dizziness, confusion, nuchal rigidity, abdominal pain, vomiting, diarrhea  rashes or weight loss.    In the ED,  Vital Signs Last 24 Hrs  T(C): 37.1 (09 Mar 2019 17:00), Max: 38.3 (09 Mar 2019 12:22)  T(F): 98.7 (09 Mar 2019 17:00), Max: 101 (09 Mar 2019 12:22)  HR: 95 (09 Mar 2019 17:00) (95 - 125)  BP: 102/54 (09 Mar 2019 17:00) (97/54 - 109/59)  BP(mean): --  RR: 18 (09 Mar 2019 17:00) (18 - 19)  SpO2: 96% (09 Mar 2019 17:00) (96% - 99%)  Given: Vanc, Acyclovir, Cefepime, LR bolus and tylenol

## 2019-03-09 NOTE — H&P ADULT - ATTENDING COMMENTS
I have personally examined the patient and reviewed the documentation above.  Corrections and edits were made wherever needed.       IMPRESSION:  No evidence of a bactreial PNA. Lungs are clear on PE and CXR with no consolidation. Bronchitis possibly bacterial and no PNA.  WBC 10  RECOMMENDATIONS;  D/c on po Levo 500 mg q24h for 5 days  Continue with ART

## 2019-03-09 NOTE — ED PROVIDER NOTE - PHYSICAL EXAMINATION
VITAL SIGNS: I have reviewed nursing notes and confirm.  CONSTITUTIONAL: Well-developed; well-nourished;   SKIN: Skin exam is warm and dry, <2 sec cap refill  HEAD: Normocephalic; atraumatic.  EYES: PERRL, EOM intact; normal conjunctiva.  ENT: MMM; airway clear.   NECK: Supple; non tender.  CARD: RRR, 2+ dp pulses  RESP: No wheezes, rales or rhonchi, speaking in full sentences  ABD: soft non tender.   EXT: Normal ROM. No edema.  NEURO: Alert, oriented. Grossly unremarkable. No focal deficits.  PSYCH: Cooperative, appropriate.

## 2019-03-09 NOTE — ED PROVIDER NOTE - OBJECTIVE STATEMENT
37 y/o F, h/o recently diagnosed HIV (on HAART), presents with persistent fever over 100F since 2/28. pt was admitted last month and dx'd with PCP. 37 y/o F, h/o recently diagnosed HIV (on HAART), presents with persistent fever over 100F since 2/28. pt was admitted last month and dx'd with PCP and thrush. pt states she has not followed up with Dr. Cota yet. her follow up appointment is scheduled for next week. pt states aside from her fever, and some rhinorrhea, she has been experiencing a sputum producing cough that started today. denies abd pain, n/v, chest pain, sob, ha, neck pain, photophobia,

## 2019-03-09 NOTE — ED PROVIDER NOTE - CARE PLAN
Principal Discharge DX:	Sepsis  Secondary Diagnosis:	HIV (human immunodeficiency virus infection) Principal Discharge DX:	Sepsis  Secondary Diagnosis:	Herpes  Secondary Diagnosis:	HIV (human immunodeficiency virus infection)  Secondary Diagnosis:	PNA (pneumonia)

## 2019-03-09 NOTE — ED PROVIDER NOTE - PROGRESS NOTE DETAILS
code sepsis initiated at this time. charge RN aware. fluids, broad sepc antibiotics, and antivirals ordered d/t pt h/o HIV code sepsis initiated at this time. charge RN aware. fluids, broad spec antibiotics, and antivirals ordered d/t pt h/o HIV.

## 2019-03-09 NOTE — ED PROVIDER NOTE - ATTENDING CONTRIBUTION TO CARE
38y f Colombian-speaking only h/o recently dx HIV on ART, thrush and PCP PNA during hosp admission from 2/11-2/28, currently on prednisone no abx, p/w fever x 10d. Accomp by cough, oral apthous ulcers and genital herpes lesions. Has not yet seen ID Dr. Cota since discharge, first f/u appt is next week. Denies ha, neck pain or stiffness, nvd, abd pain, flank pain, urinary sx, rash. PE: middle-aged f nad, ncat, perrl/eomi, op with +thrush & aphtous ulcerations, neck supple +from no meningismus, tachy 120s reg rhythm nl s1s2 no mrg, ctab no wrr, abd soft ntnd no palpable masses no rgr, no cvat, ext no cce dpi.

## 2019-03-09 NOTE — H&P ADULT - ASSESSMENT
37 y/o Japanese speaking FM w/ newly diagnosed HIV and recently managed in hospital for PCP pneumonia and discharged on (02/19) on HAART therapy p/w subjective fevers and non productive cough. Pt states that she has been compliant w/ her HAARt therapy and has had no recent travel or sick contacts. In the past 3 days she felt feverish w/ tmax of 100.6F at home. This prompted her to come to the ED. She also endorses vaginal pain similar to her herpetic pain from years ago. Denies any productive cough, chest pain, sob, dizziness, confusion, nuchal rigidity, abdominal pain, vomiting, diarrhea  rashes or weight loss.    #Fever w/ non productive cough possibly 2/2 IRIS - r/o infectious etiology  - no evidence of sepsis  - CD 4 61 and viral load: [id number]  - +ve genital herpes. No oral candidiasis  - UA negative   - flu swab negative  - c/w Biktarvy   - c/w Meprone 1500mg Qdaily  - c/w Acyclovir  - c/w bactrim, itraconazole for ppx  - f/u CXR  - f/u IFNg  - f/u blood culture  - f/u toxo, CMV, RPR serology  - f/u repeat CD4 and viral load    #Activitiy; OOBc  #Diet: Regular  #DVt/GI PPX: Lovenox/protonix  #dispo: nancy  #Code Full 39 y/o Croatian speaking FM w/ newly diagnosed HIV and recently managed in hospital for PCP pneumonia and discharged on (02/19) on HAART therapy p/w subjective fevers and non productive cough. Pt states that she has been compliant w/ her HAARt therapy and has had no recent travel or sick contacts. In the past 3 days she felt feverish w/ tmax of 100.6F at home. This prompted her to come to the ED. She also endorses vaginal pain similar to her herpetic pain from years ago. Denies any productive cough, chest pain, sob, dizziness, confusion, nuchal rigidity, abdominal pain, vomiting, diarrhea  rashes or weight loss.    #Fever w/ non productive cough possibly 2/2 IRIS - r/o infectious etiology  - no evidence of sepsis  - CD 4 61 and viral load: [id number]  - +ve genital herpes. No oral candidiasis  - UA negative   - flu swab negative  - c/w descovy and doltugravir  - c/w Meprone 1500mg Qdaily  - c/w Acyclovir  - c/w bactrim, itraconazole for ppx  - f/u CXR  - f/u IFNg  - f/u blood culture  - f/u toxo, CMV, RPR serology  - f/u repeat CD4 and viral load    #Activitiy; OOBc  #Diet: Regular  #DVt/GI PPX: Lovenox/protonix  #dispo: nancy  #Code Full

## 2019-03-09 NOTE — ED PROVIDER NOTE - NS ED ROS FT
Review of Systems:  CONSTITUTIONAL: no fever  EYES: no photophobia, no blurred vision  ENT: no ear pain, no nasal discharge  RESPIRATORY: no shortness of breath, no cough  CARDIAC: no chest pain, no palpitations  GI: no abd pain, no nausea, no vomiting, no diarrhea, no constipation,   : +dysuria; no hematuria,   MUSCULOSKELETAL: no joint paint  NEUROLOGIC: no headache,   PSYCH: no anxiety, non suicidal, non homicidal, no hallucination, no depression Review of Systems:  CONSTITUTIONAL: + fever  EYES: no photophobia, no blurred vision  ENT: no ear pain, + nasal discharge  RESPIRATORY: no shortness of breath, + cough  CARDIAC: no chest pain, no palpitations  GI: no abd pain, no nausea, no vomiting, no diarrhea, no constipation,   : +dysuria; no hematuria,   MUSCULOSKELETAL: no joint paint  NEUROLOGIC: no headache,   PSYCH: no anxiety, non suicidal, non homicidal, no hallucination, no depression

## 2019-03-10 ENCOUNTER — TRANSCRIPTION ENCOUNTER (OUTPATIENT)
Age: 39
End: 2019-03-10

## 2019-03-10 LAB
ALBUMIN SERPL ELPH-MCNC: 3 G/DL — LOW (ref 3.5–5.2)
ALP SERPL-CCNC: 90 U/L — SIGNIFICANT CHANGE UP (ref 30–115)
ALT FLD-CCNC: 21 U/L — SIGNIFICANT CHANGE UP (ref 0–41)
ANION GAP SERPL CALC-SCNC: 10 MMOL/L — SIGNIFICANT CHANGE UP (ref 7–14)
AST SERPL-CCNC: 18 U/L — SIGNIFICANT CHANGE UP (ref 0–41)
BASOPHILS # BLD AUTO: 0.04 K/UL — SIGNIFICANT CHANGE UP (ref 0–0.2)
BASOPHILS NFR BLD AUTO: 0.7 % — SIGNIFICANT CHANGE UP (ref 0–1)
BILIRUB SERPL-MCNC: 0.5 MG/DL — SIGNIFICANT CHANGE UP (ref 0.2–1.2)
BUN SERPL-MCNC: 9 MG/DL — LOW (ref 10–20)
CALCIUM SERPL-MCNC: 8.7 MG/DL — SIGNIFICANT CHANGE UP (ref 8.5–10.1)
CHLORIDE SERPL-SCNC: 104 MMOL/L — SIGNIFICANT CHANGE UP (ref 98–110)
CO2 SERPL-SCNC: 23 MMOL/L — SIGNIFICANT CHANGE UP (ref 17–32)
CREAT SERPL-MCNC: 0.7 MG/DL — SIGNIFICANT CHANGE UP (ref 0.7–1.5)
CULTURE RESULTS: SIGNIFICANT CHANGE UP
EOSINOPHIL # BLD AUTO: 0.24 K/UL — SIGNIFICANT CHANGE UP (ref 0–0.7)
EOSINOPHIL NFR BLD AUTO: 3.9 % — SIGNIFICANT CHANGE UP (ref 0–8)
GLUCOSE SERPL-MCNC: 140 MG/DL — HIGH (ref 70–99)
HCT VFR BLD CALC: 33.5 % — LOW (ref 37–47)
HGB BLD-MCNC: 10.9 G/DL — LOW (ref 12–16)
IMM GRANULOCYTES NFR BLD AUTO: 1 % — HIGH (ref 0.1–0.3)
LYMPHOCYTES # BLD AUTO: 0.93 K/UL — LOW (ref 1.2–3.4)
LYMPHOCYTES # BLD AUTO: 15.2 % — LOW (ref 20.5–51.1)
MCHC RBC-ENTMCNC: 31.4 PG — HIGH (ref 27–31)
MCHC RBC-ENTMCNC: 32.5 G/DL — SIGNIFICANT CHANGE UP (ref 32–37)
MCV RBC AUTO: 96.5 FL — SIGNIFICANT CHANGE UP (ref 81–99)
MONOCYTES # BLD AUTO: 0.66 K/UL — HIGH (ref 0.1–0.6)
MONOCYTES NFR BLD AUTO: 10.8 % — HIGH (ref 1.7–9.3)
NEUTROPHILS # BLD AUTO: 4.18 K/UL — SIGNIFICANT CHANGE UP (ref 1.4–6.5)
NEUTROPHILS NFR BLD AUTO: 68.4 % — SIGNIFICANT CHANGE UP (ref 42.2–75.2)
NRBC # BLD: 0 /100 WBCS — SIGNIFICANT CHANGE UP (ref 0–0)
PLATELET # BLD AUTO: 217 K/UL — SIGNIFICANT CHANGE UP (ref 130–400)
POTASSIUM SERPL-MCNC: 3.8 MMOL/L — SIGNIFICANT CHANGE UP (ref 3.5–5)
POTASSIUM SERPL-SCNC: 3.8 MMOL/L — SIGNIFICANT CHANGE UP (ref 3.5–5)
PROT SERPL-MCNC: 6.8 G/DL — SIGNIFICANT CHANGE UP (ref 6–8)
RBC # BLD: 3.47 M/UL — LOW (ref 4.2–5.4)
RBC # FLD: 15.9 % — HIGH (ref 11.5–14.5)
SODIUM SERPL-SCNC: 137 MMOL/L — SIGNIFICANT CHANGE UP (ref 135–146)
SPECIMEN SOURCE: SIGNIFICANT CHANGE UP
WBC # BLD: 6.11 K/UL — SIGNIFICANT CHANGE UP (ref 4.8–10.8)
WBC # FLD AUTO: 6.11 K/UL — SIGNIFICANT CHANGE UP (ref 4.8–10.8)

## 2019-03-10 RX ORDER — ATOVAQUONE 750 MG/5ML
10 SUSPENSION ORAL
Qty: 0 | Refills: 0 | COMMUNITY
Start: 2019-03-10

## 2019-03-10 RX ORDER — CIPROFLOXACIN LACTATE 400MG/40ML
1 VIAL (ML) INTRAVENOUS
Qty: 5 | Refills: 0 | OUTPATIENT
Start: 2019-03-10 | End: 2019-03-14

## 2019-03-10 RX ORDER — LANOLIN ALCOHOL/MO/W.PET/CERES
5 CREAM (GRAM) TOPICAL AT BEDTIME
Qty: 0 | Refills: 0 | Status: DISCONTINUED | OUTPATIENT
Start: 2019-03-10 | End: 2019-03-13

## 2019-03-10 RX ADMIN — DOLUTEGRAVIR SODIUM 50 MILLIGRAM(S): 25 TABLET, FILM COATED ORAL at 12:24

## 2019-03-10 RX ADMIN — Medication 650 MILLIGRAM(S): at 19:02

## 2019-03-10 RX ADMIN — Medication 1 TABLET(S): at 12:25

## 2019-03-10 RX ADMIN — ENOXAPARIN SODIUM 40 MILLIGRAM(S): 100 INJECTION SUBCUTANEOUS at 21:14

## 2019-03-10 RX ADMIN — Medication 650 MILLIGRAM(S): at 20:00

## 2019-03-10 RX ADMIN — EMTRICITABINE AND TENOFOVIR DISOPROXIL FUMARATE 1 TABLET(S): 200; 300 TABLET, FILM COATED ORAL at 12:23

## 2019-03-10 RX ADMIN — FLUCONAZOLE 200 MILLIGRAM(S): 150 TABLET ORAL at 12:24

## 2019-03-10 RX ADMIN — Medication 650 MILLIGRAM(S): at 08:18

## 2019-03-10 RX ADMIN — Medication 650 MILLIGRAM(S): at 09:00

## 2019-03-10 RX ADMIN — Medication 5 MILLIGRAM(S): at 23:57

## 2019-03-10 RX ADMIN — ATOVAQUONE 1500 MILLIGRAM(S): 750 SUSPENSION ORAL at 12:24

## 2019-03-10 RX ADMIN — Medication 400 MILLIGRAM(S): at 15:02

## 2019-03-10 RX ADMIN — Medication 400 MILLIGRAM(S): at 21:13

## 2019-03-10 RX ADMIN — Medication 400 MILLIGRAM(S): at 06:29

## 2019-03-10 NOTE — DISCHARGE NOTE ADULT - SECONDARY DIAGNOSIS.
HIV infection, unspecified symptom status HIV (human immunodeficiency virus infection) AIDS (acquired immunodeficiency syndrome), CD4 <=200

## 2019-03-10 NOTE — DISCHARGE NOTE ADULT - PATIENT PORTAL LINK FT
You can access the [email or web address], offered by [facility], by registering with the following website: [email or web address]

## 2019-03-10 NOTE — DISCHARGE NOTE ADULT - CARE PROVIDER_API CALL
Adina Cota)  Internal Medicine  [address] [place] [address]  Phone: [phone]  Fax: [phone]  Follow Up Time: 1 week    Minna Overton (DO)  Family Medicine  [address] [place] [address]  Phone: [phone]  Fax: [phone]  Follow Up Time:

## 2019-03-10 NOTE — DISCHARGE NOTE ADULT - CARE PLAN
Principal Discharge DX:	Bronchitis  Goal:	antibiotics  Assessment and plan of treatment:	You have bronchitis and will be prescribed a 5 day course of antibiotics. Please take as directed and follow up with primary doctor and infectious disease doctor in 1-2 weeks.  Secondary Diagnosis:	HIV infection, unspecified symptom status  Goal:	medical management  Assessment and plan of treatment:	Please take all medications as directed. Follow up with primary doctor and infectious disease doctor in 1-2 weeks. Principal Discharge DX:	PCP (pneumocystis jiroveci pneumonia)  Goal:	antibiotics  Assessment and plan of treatment:	You have had Pneumonia  in the setting of HIV AIDS.  Please take as directed and follow up with primary doctor and infectious disease doctor in 1 week of discharge. Please follow up with Dr. Cota   march 20th  at 230pm.  Secondary Diagnosis:	HIV (human immunodeficiency virus infection)  Goal:	medical management  Assessment and plan of treatment:	Please take all medications as directed. Follow up with primary doctor and infectious disease doctor in 1-2 weeks.  Secondary Diagnosis:	AIDS (acquired immunodeficiency syndrome), CD4 <=200  Goal:	Evaluation and therapy  Assessment and plan of treatment:	Please continue taking Mepron, Levaquin and Valtrex as directed.  Your PPD was found to be negative during this admission. Principal Discharge DX:	PCP (pneumocystis jiroveci pneumonia)  Goal:	antibiotics  Assessment and plan of treatment:	You have had Pneumonia  in the setting of HIV AIDS.  Please take as directed and follow up with primary doctor and infectious disease doctor in 1 week of discharge. Please follow up with Dr. Cota   march 20th  at 230pm. Please continue Levaquin 500mg for 5 more days then stop.  Secondary Diagnosis:	HIV (human immunodeficiency virus infection)  Goal:	medical management  Assessment and plan of treatment:	Please take all medications as directed. Follow up with Dr. Brewer and PMD.  Secondary Diagnosis:	AIDS (acquired immunodeficiency syndrome), CD4 <=200  Goal:	Evaluation and therapy  Assessment and plan of treatment:	Please continue taking Mepron, Levaquin and Valtrex as directed.  Your PPD was found to be negative during this admission.

## 2019-03-10 NOTE — DISCHARGE NOTE ADULT - PLAN OF CARE
antibiotics You have bronchitis and will be prescribed a 5 day course of antibiotics. Please take as directed and follow up with primary doctor and infectious disease doctor in 1-2 weeks. medical management Please take all medications as directed. Follow up with primary doctor and infectious disease doctor in 1-2 weeks. You have had Pneumonia  in the setting of HIV AIDS.  Please take as directed and follow up with primary doctor and infectious disease doctor in 1 week of discharge. Please follow up with Dr. Cota   march 20th  at 230pm. Evaluation and therapy Please continue taking Mepron, Levaquin and Valtrex as directed.  Your PPD was found to be negative during this admission. You have had Pneumonia  in the setting of HIV AIDS.  Please take as directed and follow up with primary doctor and infectious disease doctor in 1 week of discharge. Please follow up with Dr. Cota   march 20th  at 230pm. Please continue Levaquin 500mg for 5 more days then stop. Please take all medications as directed. Follow up with Dr. Brewer and PMD.

## 2019-03-10 NOTE — DISCHARGE NOTE ADULT - HOSPITAL COURSE
37 y/o Portuguese speaking FM w/ newly diagnosed HIV and recently managed in hospital for PCP pneumonia and discharged on (02/19) on HAART therapy p/w subjective fevers and non productive cough. Pt states that she has been compliant w/ her HAARt therapy and has had no recent travel or sick contacts. In the past 3 days she felt feverish w/ tmax of 100.6F at home. This prompted her to come to the ED. She also endorses vaginal pain similar to her herpetic pain from years ago. Denies any productive cough, chest pain, sob, dizziness, confusion, nuchal rigidity, abdominal pain, vomiting, diarrhea  rashes or weight loss. 37 y/o Colombian speaking FM w/ newly diagnosed HIV and recently managed in hospital for PCP pneumonia and discharged on (02/19) on HAART therapy p/w subjective fevers and non productive cough. Pt states that she has been compliant w/ her HAARt therapy and has had no recent travel or sick contacts. In the past 3 days she felt feverish w/ tmax of 100.6F at home. This prompted her to come to the ED. ID evaluated patient, determined that there is no evidence of a bacterial PNA. Lungs are clear on PE and CXR with no consolidation. Bronchitis possibly bacterial and no PNA.  WBC 10. Advised to D/c on po Levaquin 500 mg q24h for 5 days and continue with ART . She will f/u outpatient with PMD and ID. 37 y/o Mohawk speaking FM w/ newly diagnosed HIV and recently managed in hospital for PCP pneumonia and discharged on (02/19) on HAART therapy p/w subjective fevers and non productive cough. Pt states that she has been compliant w/ her HAARt therapy and has had no recent travel or sick contacts. In the past 3 days she felt feverish w/ tmax of 100.6F at home. This prompted her to come to the ED.  Newly Diagnosed HIV AIDS  with Initial CD4 of 5, and Viral Count [id number].     Todays Events: PPD negative, OP ID follow up with Dr. Brewer , DC with Mepron, Levaquin, and Valtrex.   f/u Am cortisol and TSH. Patient is hemodynamically stable with no systemic signs of infection and stable for discharge today.     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 Pt states that she has been compliant w/ her HAARt therapy and has had no recent travel or sick contacts. In the past 3 days she felt feverish w/ tmax of 100.6F at home. This prompted her to come to the ED.  Newly Diagnosed HIV AIDS  with Initial CD4 of 5, and Viral Count [id number].     Todays Events: PPD negative, OP ID follow up with Dr. Brewer , DC with Mepron, Levaquin, and Valtrex.   f/u Am cortisol and TSH. Patient is hemodynamically stable with no systemic signs of infection and stable for discharge today.     IMPRESSION   HIV AIDS on HAART therapy - possible  Immune reconstitution inflammatory syndrome, C/w HAART  HO PCP - Status Post treatment   R/O Tb qauntifernGold indeterminant   B/l intersitial Marking on CXR  improved         Plan  HIV AIDS on HAART therapy  - c/w mepron, Levaquin PO, bicktarvy   - Cd4 and viral load out patient f/u   -   PPD  negative , f/u  Fungitell, RVP, Histoplasma   results OP   -     Low systolic BP- stable at baseline

## 2019-03-10 NOTE — DISCHARGE NOTE ADULT - MEDICATION SUMMARY - MEDICATIONS TO TAKE
I will START or STAY ON the medications listed below when I get home from the hospital:    Biktarvy oral tablet  -- 1 tab(s) by mouth once a day   -- Check with your doctor before becoming pregnant.  It is very important that you take or use this exactly as directed.  Do not skip doses or discontinue unless directed by your doctor.  Obtain medical advice before taking any non-prescription drugs as some may affect the action of this medication.  Store this medication in the original package.    -- Indication: For HIV (human immunodeficiency virus infection)    Levaquin 500 mg oral tablet  -- 1 tab(s) by mouth once a day   -- Avoid prolonged or excessive exposure to direct and/or artificial sunlight while taking this medication.  Do not take dairy products, antacids, or iron preparations within one hour of this medication.  Finish all this medication unless otherwise directed by prescriber.  May cause drowsiness or dizziness.  Medication should be taken with plenty of water.    -- Indication: For bronchitis    Multiple Vitamins oral tablet  -- 1 tab(s) by mouth once a day  -- Indication: For Supplement I will START or STAY ON the medications listed below when I get home from the hospital:    Biktarvy oral tablet  -- 1 tab(s) by mouth once a day   -- Check with your doctor before becoming pregnant.  It is very important that you take or use this exactly as directed.  Do not skip doses or discontinue unless directed by your doctor.  Obtain medical advice before taking any non-prescription drugs as some may affect the action of this medication.  Store this medication in the original package.    -- Indication: For HIV (human immunodeficiency virus infection)    Mepron 750 mg/5 mL oral suspension  -- 10 milliliter(s) by mouth once a day  -- Indication: For HIV (human immunodeficiency virus infection)    Levaquin 500 mg oral tablet  -- 1 tab(s) by mouth once a day   -- Avoid prolonged or excessive exposure to direct and/or artificial sunlight while taking this medication.  Do not take dairy products, antacids, or iron preparations within one hour of this medication.  Finish all this medication unless otherwise directed by prescriber.  May cause drowsiness or dizziness.  Medication should be taken with plenty of water.    -- Indication: For bronchitis    Multiple Vitamins oral tablet  -- 1 tab(s) by mouth once a day  -- Indication: For Supplement I will START or STAY ON the medications listed below when I get home from the hospital:    Valtrex 1 g oral tablet  -- 1 tab(s) by mouth 2 times a day   -- It is very important that you take or use this exactly as directed.  Do not skip doses or discontinue unless directed by your doctor.    -- Indication: For HIV AIDS    Biktarvy oral tablet  -- 1 tab(s) by mouth once a day   -- Check with your doctor before becoming pregnant.  It is very important that you take or use this exactly as directed.  Do not skip doses or discontinue unless directed by your doctor.  Obtain medical advice before taking any non-prescription drugs as some may affect the action of this medication.  Store this medication in the original package.    -- Indication: For HIV (human immunodeficiency virus infection)    Mepron 750 mg/5 mL oral suspension  -- 10 milliliter(s) by mouth once a day  -- Indication: For PCP    Levaquin 500 mg oral tablet  -- 1 tab(s) by mouth once a day   -- Avoid prolonged or excessive exposure to direct and/or artificial sunlight while taking this medication.  Do not take dairy products, antacids, or iron preparations within one hour of this medication.  Finish all this medication unless otherwise directed by prescriber.  May cause drowsiness or dizziness.  Medication should be taken with plenty of water.    -- Indication: For Herpes    Multiple Vitamins oral tablet  -- 1 tab(s) by mouth once a day  -- Indication: For Supplement    folic acid 1 mg oral tablet  -- 1 tab(s) by mouth once a day   -- Indication: For Supplement I will START or STAY ON the medications listed below when I get home from the hospital:    Valtrex 1 g oral tablet  -- 1 tab(s) by mouth 2 times a day   -- It is very important that you take or use this exactly as directed.  Do not skip doses or discontinue unless directed by your doctor.    -- Indication: For HIV AIDS    Biktarvy oral tablet  -- 1 tab(s) by mouth once a day   -- Check with your doctor before becoming pregnant.  It is very important that you take or use this exactly as directed.  Do not skip doses or discontinue unless directed by your doctor.  Obtain medical advice before taking any non-prescription drugs as some may affect the action of this medication.  Store this medication in the original package.    -- Indication: For HIV (human immunodeficiency virus infection)    Valtrex 1 g oral tablet  -- 1 tab(s) by mouth once a day   -- It is very important that you take or use this exactly as directed.  Do not skip doses or discontinue unless directed by your doctor.    -- Indication: For AIDS (acquired immunodeficiency syndrome), CD4 <=200    Mepron 750 mg/5 mL oral suspension  -- 10 milliliter(s) by mouth once a day  -- Indication: For PCP    Levaquin 500 mg oral tablet  -- 1 tab(s) by mouth once a day   -- Avoid prolonged or excessive exposure to direct and/or artificial sunlight while taking this medication.  Do not take dairy products, antacids, or iron preparations within one hour of this medication.  Finish all this medication unless otherwise directed by prescriber.  May cause drowsiness or dizziness.  Medication should be taken with plenty of water.    -- Indication: For Herpes    Multiple Vitamins oral tablet  -- 1 tab(s) by mouth once a day  -- Indication: For Supplement    folic acid 1 mg oral tablet  -- 1 tab(s) by mouth once a day   -- Indication: For Supplement

## 2019-03-10 NOTE — DISCHARGE NOTE ADULT - MEDICATION SUMMARY - MEDICATIONS TO STOP TAKING
I will STOP taking the medications listed below when I get home from the hospital:  None I will STOP taking the medications listed below when I get home from the hospital:    predniSONE 20 mg oral tablet  -- 1 tab(s) by mouth once a day    atovaquone 750 mg/5 mL oral suspension  -- 10 milliliter(s) by mouth once a day   -- Finish all this medication unless otherwise directed by prescriber.  Shake well before use.  Take with food or milk.

## 2019-03-11 LAB
CMV IGM FLD-ACNC: 107 AU/ML — HIGH
CMV IGM SERPL QL: POSITIVE
T GONDII IGM SER QL: <3 AU/ML — SIGNIFICANT CHANGE UP
T GONDII IGM SER QL: NEGATIVE — SIGNIFICANT CHANGE UP

## 2019-03-11 RX ORDER — TUBERCULIN PURIFIED PROTEIN DERIVATIVE 5 [IU]/.1ML
5 INJECTION, SOLUTION INTRADERMAL ONCE
Qty: 0 | Refills: 0 | Status: COMPLETED | OUTPATIENT
Start: 2019-03-11 | End: 2019-03-11

## 2019-03-11 RX ORDER — MIDODRINE HYDROCHLORIDE 2.5 MG/1
5 TABLET ORAL THREE TIMES A DAY
Qty: 0 | Refills: 0 | Status: DISCONTINUED | OUTPATIENT
Start: 2019-03-11 | End: 2019-03-12

## 2019-03-11 RX ADMIN — ENOXAPARIN SODIUM 40 MILLIGRAM(S): 100 INJECTION SUBCUTANEOUS at 22:14

## 2019-03-11 RX ADMIN — Medication 400 MILLIGRAM(S): at 22:15

## 2019-03-11 RX ADMIN — ATOVAQUONE 1500 MILLIGRAM(S): 750 SUSPENSION ORAL at 11:34

## 2019-03-11 RX ADMIN — EMTRICITABINE AND TENOFOVIR DISOPROXIL FUMARATE 1 TABLET(S): 200; 300 TABLET, FILM COATED ORAL at 12:36

## 2019-03-11 RX ADMIN — Medication 1 TABLET(S): at 11:33

## 2019-03-11 RX ADMIN — MIDODRINE HYDROCHLORIDE 5 MILLIGRAM(S): 2.5 TABLET ORAL at 13:35

## 2019-03-11 RX ADMIN — DOLUTEGRAVIR SODIUM 50 MILLIGRAM(S): 25 TABLET, FILM COATED ORAL at 11:33

## 2019-03-11 RX ADMIN — Medication 400 MILLIGRAM(S): at 13:35

## 2019-03-11 RX ADMIN — Medication 400 MILLIGRAM(S): at 05:54

## 2019-03-11 RX ADMIN — TUBERCULIN PURIFIED PROTEIN DERIVATIVE 5 UNIT(S): 5 INJECTION, SOLUTION INTRADERMAL at 14:36

## 2019-03-11 RX ADMIN — Medication 5 MILLIGRAM(S): at 22:14

## 2019-03-11 RX ADMIN — MIDODRINE HYDROCHLORIDE 5 MILLIGRAM(S): 2.5 TABLET ORAL at 22:14

## 2019-03-11 NOTE — PROGRESS NOTE ADULT - SUBJECTIVE AND OBJECTIVE BOX
LENGTH OF HOSPITAL STAY: 2d      CHIEF COMPLAINT:   Patient is a 38y old  Female who presents with a chief complaint of fever (11 Mar 2019 09:17)      OVER Past 24hrs:  The patient was seen and examined at bedside there were no acute events during the night. Patient deneis fever chills chest pain cough or sputum production.        PAST MEDICAL & SURGICAL HISTORY  PAST MEDICAL & SURGICAL HISTORY:  Herpes  HIV (human immunodeficiency virus infection)  No significant past surgical history      REVIEW OF SYSTEMS  CONSTITUTIONAL: No fever +soar through, Rhinorhea  RESPIRATORY: No cough, wheezing, chills or hemoptysis; No shortness of breath  CARDIOVASCULAR: No chest pain, palpitations, dizziness, or leg swelling  GASTROINTESTINAL: No abdominal  pain. No diarrhea or constipation.    ALLERGIES:  No Known Allergies    MEDICATIONS:  STANDING MEDICATIONS  acyclovir   Oral Tab/Cap 400 milliGRAM(s) Oral three times a day  atovaquone Suspension 1500 milliGRAM(s) Oral daily  dolutegravir 50 milliGRAM(s) Oral daily  emtricitabine 200 mG/tenofovir alafenamide 25 mG (DESCOVY) Tablet 1 Tablet(s) Oral daily  enoxaparin Injectable 40 milliGRAM(s) SubCutaneous every 24 hours  influenza   Vaccine 0.5 milliLiter(s) IntraMuscular once  levoFLOXacin IVPB 500 milliGRAM(s) IV Intermittent every 24 hours  levoFLOXacin IVPB      melatonin 5 milliGRAM(s) Oral at bedtime  midodrine 5 milliGRAM(s) Oral three times a day  multivitamin 1 Tablet(s) Oral daily    PRN MEDICATIONS  acetaminophen   Tablet .. 650 milliGRAM(s) Oral every 6 hours PRN    VITALS:   T(F): 98.6  HR: 88  BP: 110/57  RR: 18  SpO2: 98%    PHYSICAL EXAM:  General: No acute distress.  Alert, oriented, interactive, nonfocal. Young  Female     HEENT: Pupils equal, reactive to light symmetrically.    PULM: Clear to auscultation bilaterally, no significant sputum production.     CVS: Regular rate and rhythm, no murmurs, rubs, or gallops.     ABD: Soft, nondistended, nontender, no masses.     EXT: No edema, nontender,     SKIN: Warm and well perfused, no rashes noted.    LABS:                        10.9   6.11  )-----------( 217      ( 10 Mar 2019 09:21 )             33.5     03-10    137  |  104  |  9<L>  ----------------------------<  140<H>  3.8   |  23  |  0.7    Ca    8.7      10 Mar 2019 09:21    TPro  6.8  /  Alb  3.0<L>  /  TBili  0.5  /  DBili  x   /  AST  18  /  ALT  21  /  AlkPhos  90  03-10              Culture - Blood (collected 10 Mar 2019 09:21)  Source: .Blood None  Preliminary Report (11 Mar 2019 16:01):    No growth to date.    Culture - Urine (collected 09 Mar 2019 16:10)  Source: .Urine Clean Catch (Midstream)  Final Report (10 Mar 2019 21:21):    <10,000 CFU/mL Normal Urogenital Sayda    Culture - Blood (collected 09 Mar 2019 14:35)  Source: .Blood Blood-Peripheral  Preliminary Report (10 Mar 2019 21:01):    No growth to date.    Culture - Blood (collected 09 Mar 2019 14:35)  Source: .Blood Blood-Peripheral  Preliminary Report (10 Mar 2019 21:01):    No growth to date.          RADIOLOGY:    < from: Xray Chest 2 Views PA/Lat (03.09.19 @ 15:01) >    Impression:      Improved aeration to both lung fields with residual disease present,   right greater than left    < end of copied text >

## 2019-03-11 NOTE — PROGRESS NOTE ADULT - SUBJECTIVE AND OBJECTIVE BOX
BELEN BARTON  38y, Female      OVERNIGHT EVENTS:  tm 100.4    ROS negative except as per above    VITALS:  T(F): 99.4, Max: 100.4 (03-10-19 @ 19:03)  HR: 83  BP: 101/62  RR: 18Vital Signs Last 24 Hrs  T(C): 37.4 (11 Mar 2019 04:46), Max: 38 (10 Mar 2019 19:03)  T(F): 99.4 (11 Mar 2019 04:46), Max: 100.4 (10 Mar 2019 19:03)  HR: 83 (11 Mar 2019 04:46) (73 - 83)  BP: 101/62 (11 Mar 2019 04:46) (85/50 - 134/79)  BP(mean): --  RR: 18 (11 Mar 2019 04:46) (18 - 18)  SpO2: 98% (10 Mar 2019 22:32) (98% - 98%)    PHYSICAL EXAM  ***    TESTS & MEASUREMENTS:                        10.9   6.11  )-----------( 217      ( 10 Mar 2019 09:21 )             33.5     03-10    137  |  104  |  9<L>  ----------------------------<  140<H>  3.8   |  23  |  0.7    Ca    8.7      10 Mar 2019 09:21    TPro  6.8  /  Alb  3.0<L>  /  TBili  0.5  /  DBili  x   /  AST  18  /  ALT  21  /  AlkPhos  90  03-10    LIVER FUNCTIONS - ( 10 Mar 2019 09:21 )  Alb: 3.0 g/dL / Pro: 6.8 g/dL / ALK PHOS: 90 U/L / ALT: 21 U/L / AST: 18 U/L / GGT: x             Culture - Urine (collected 19 @ 16:10)  Source: .Urine Clean Catch (Midstream)  Final Report (03-10-19 @ 21:21):    <10,000 CFU/mL Normal Urogenital Sayda    Culture - Blood (collected 19 @ 14:35)  Source: .Blood Blood-Peripheral  Preliminary Report (03-10-19 @ 21:01):    No growth to date.    Culture - Blood (collected 19 @ 14:35)  Source: .Blood Blood-Peripheral  Preliminary Report (03-10-19 @ 21:01):    No growth to date.      Urinalysis Basic - ( 09 Mar 2019 16:10 )    Color: Dark Yellow / Appearance: ERROR / S.025 / pH: x  Gluc: x / Ketone: Negative  / Bili: Negative / Urobili: 1.0   Blood: x / Protein: Trace / Nitrite: Negative   Leuk Esterase: Small / RBC: x / WBC 3-5 /HPF   Sq Epi: x / Non Sq Epi: x / Bacteria: Few /HPF        RADIOLOGY & ADDITIONAL TESTS:    ANTIBIOTICS:  acyclovir   Oral Tab/Cap   800 milliGRAM(s) Oral (19 @ 15:46)    acyclovir   Oral Tab/Cap   400 milliGRAM(s) Oral (19 @ 05:54)   400 milliGRAM(s) Oral (03-10-19 @ 21:13)   400 milliGRAM(s) Oral (03-10-19 @ 15:02)   400 milliGRAM(s) Oral (03-10-19 @ 06:29)   400 milliGRAM(s) Oral (19 @ 22:21)    atovaquone Suspension   1500 milliGRAM(s) Oral (03-10-19 @ 12:24)    cefepime   IVPB   100 mL/Hr IV Intermittent (19 @ 15:45)    dolutegravir   50 milliGRAM(s) Oral (03-10-19 @ 12:24)    emtricitabine 200 mG/tenofovir alafenamide 25 mG (DESCOVY) Tablet   1 Tablet(s) Oral (03-10-19 @ 12:23)    fluconAZOLE   Tablet   200 milliGRAM(s) Oral (03-10-19 @ 12:24)    levoFLOXacin IVPB   100 mL/Hr IV Intermittent (03-10-19 @ 09:48)    levoFLOXacin IVPB   100 mL/Hr IV Intermittent (19 @ 07:38)    trimethoprim  160 mG/sulfamethoxazole 800 mG   1 Tablet(s) Oral (03-10-19 @ 12:25)    vancomycin  IVPB   250 mL/Hr IV Intermittent (19 @ 15:45)        acyclovir   Oral Tab/Cap 400 milliGRAM(s) Oral three times a day  atovaquone Suspension 1500 milliGRAM(s) Oral daily  dolutegravir 50 milliGRAM(s) Oral daily  emtricitabine 200 mG/tenofovir alafenamide 25 mG (DESCOVY) Tablet 1 Tablet(s) Oral daily  fluconAZOLE   Tablet 200 milliGRAM(s) Oral daily  levoFLOXacin IVPB 500 milliGRAM(s) IV Intermittent every 24 hours  levoFLOXacin IVPB      trimethoprim  160 mG/sulfamethoxazole 800 mG 1 Tablet(s) Oral daily BARTONBELEN JUSTICE  38y, Female      OVERNIGHT EVENTS:  tm 100.4  reports improvement in genital lesions with ACV  inner lips ulcer/pustule    ROS negative except as per above    VITALS:  T(F): 99.4, Max: 100.4 (03-10-19 @ 19:03)  HR: 83  BP: 101/62  RR: 18Vital Signs Last 24 Hrs  T(C): 37.4 (11 Mar 2019 04:46), Max: 38 (10 Mar 2019 19:03)  T(F): 99.4 (11 Mar 2019 04:46), Max: 100.4 (10 Mar 2019 19:03)  HR: 83 (11 Mar 2019 04:46) (73 - 83)  BP: 101/62 (11 Mar 2019 04:46) (85/50 - 134/79)  BP(mean): --  RR: 18 (11 Mar 2019 04:46) (18 - 18)  SpO2: 98% (10 Mar 2019 22:32) (98% - 98%)    PHYSICAL EXAM  Gen: Awake and alert, non-toxic appearing, NAD  HEENT: NCAT. EOMI. MMM. no thrush, +ulcer/pustule inner lip  Neck: Supple, no cervical LAD  CV: RRR, no murmurs  Lungs: CTAB, no w/r/r  Abd: Soft. NTND  : shallow labial ulcers  Extr: wwp, no edema  Skin: no rash  Neuro: No focal deficits  Lines: clean      TESTS & MEASUREMENTS:                        10.9   6.11  )-----------( 217      ( 10 Mar 2019 09:21 )             33.5     03-10    137  |  104  |  9<L>  ----------------------------<  140<H>  3.8   |  23  |  0.7    Ca    8.7      10 Mar 2019 09:21    TPro  6.8  /  Alb  3.0<L>  /  TBili  0.5  /  DBili  x   /  AST  18  /  ALT  21  /  AlkPhos  90  03-10    LIVER FUNCTIONS - ( 10 Mar 2019 09:21 )  Alb: 3.0 g/dL / Pro: 6.8 g/dL / ALK PHOS: 90 U/L / ALT: 21 U/L / AST: 18 U/L / GGT: x             Culture - Urine (collected 19 @ 16:10)  Source: .Urine Clean Catch (Midstream)  Final Report (03-10-19 @ 21:21):    <10,000 CFU/mL Normal Urogenital Sayda    Culture - Blood (collected 19 @ 14:35)  Source: .Blood Blood-Peripheral  Preliminary Report (03-10-19 @ 21:01):    No growth to date.    Culture - Blood (collected 19 @ 14:35)  Source: .Blood Blood-Peripheral  Preliminary Report (03-10-19 @ 21:01):    No growth to date.      Urinalysis Basic - ( 09 Mar 2019 16:10 )    Color: Dark Yellow / Appearance: ERROR / S.025 / pH: x  Gluc: x / Ketone: Negative  / Bili: Negative / Urobili: 1.0   Blood: x / Protein: Trace / Nitrite: Negative   Leuk Esterase: Small / RBC: x / WBC 3-5 /HPF   Sq Epi: x / Non Sq Epi: x / Bacteria: Few /HPF        RADIOLOGY & ADDITIONAL TESTS:    ANTIBIOTICS:  acyclovir   Oral Tab/Cap   800 milliGRAM(s) Oral (19 @ 15:46)    acyclovir   Oral Tab/Cap   400 milliGRAM(s) Oral (19 @ 05:54)   400 milliGRAM(s) Oral (03-10-19 @ 21:13)   400 milliGRAM(s) Oral (03-10-19 @ 15:02)   400 milliGRAM(s) Oral (03-10-19 @ 06:29)   400 milliGRAM(s) Oral (19 @ 22:21)    atovaquone Suspension   1500 milliGRAM(s) Oral (03-10-19 @ 12:24)    cefepime   IVPB   100 mL/Hr IV Intermittent (19 @ 15:45)    dolutegravir   50 milliGRAM(s) Oral (03-10-19 @ 12:24)    emtricitabine 200 mG/tenofovir alafenamide 25 mG (DESCOVY) Tablet   1 Tablet(s) Oral (03-10-19 @ 12:23)    fluconAZOLE   Tablet   200 milliGRAM(s) Oral (03-10-19 @ 12:24)    levoFLOXacin IVPB   100 mL/Hr IV Intermittent (03-10-19 @ 09:48)    levoFLOXacin IVPB   100 mL/Hr IV Intermittent (19 @ 07:38)    trimethoprim  160 mG/sulfamethoxazole 800 mG   1 Tablet(s) Oral (03-10-19 @ 12:25)    vancomycin  IVPB   250 mL/Hr IV Intermittent (19 @ 15:45)        acyclovir   Oral Tab/Cap 400 milliGRAM(s) Oral three times a day  atovaquone Suspension 1500 milliGRAM(s) Oral daily  dolutegravir 50 milliGRAM(s) Oral daily  emtricitabine 200 mG/tenofovir alafenamide 25 mG (DESCOVY) Tablet 1 Tablet(s) Oral daily  fluconAZOLE   Tablet 200 milliGRAM(s) Oral daily  levoFLOXacin IVPB 500 milliGRAM(s) IV Intermittent every 24 hours  levoFLOXacin IVPB      trimethoprim  160 mG/sulfamethoxazole 800 mG 1 Tablet(s) Oral daily

## 2019-03-11 NOTE — PROGRESS NOTE ADULT - ASSESSMENT
43F from [place]    Symptomatic AIDS CD4 70; 5% HIV-1 RNA Quantitative, Viral Load: [id number] (02.12.19 @ 00:45)  Recent admission for presumed PCP PNA  Indeterminate quantiferon gold (low suspicion for active TB); AFB neg x3 last admission  Toxo IgG neg, CMV IgG +, RPR neg  CXR Hazy opacities with a central predominance are unchanged.    #Fever & cough: possible IRIS.   - Send RVP  - On levaquin    #AIDS  - ART: Biktarvy (non-formulary)  - mepron 1500mg daily PO       Spectra [phone] from [place]    Symptomatic AIDS CD4 70; 5% HIV-1 RNA Quantitative, Viral Load: [id number] (02.12.19 @ 00:45)  Recent admission for presumed PCP PNA  Indeterminate quantiferon gold (low suspicion for active TB); AFB neg x3 last admission  Toxo IgG neg, CMV IgG +, RPR neg  CXR Hazy opacities with a central predominance are unchanged.  bcx, Ucx NGTD, previous 2/21 AFB cx NG    #Fever & cough: possible IRIS.   - Send RVP as reports some sore throat, rhinorrhea  - On levaquin    #AIDS  - ART: Biktarvy (non-formulary)  - Check Viral LOAD   - mepron 1500mg daily PO       Spectra [phone] from [place]    Symptomatic AIDS CD4 70; 5% HIV-1 RNA Quantitative, Viral Load: [id number] (02.12.19 @ 00:45)  Recent admission for presumed PCP PNA  Indeterminate quantiferon gold (low suspicion for active TB); AFB neg x3 last admission  Toxo IgG neg, CMV IgG +, RPR neg  CXR Hazy opacities with a central predominance are unchanged.  bcx, Ucx NGTD, previous 2/21 AFB cx NG    #Fever & cough: possible IRIS.   - Send RVP as reports some sore throat, rhinorrhea  - On levaquin  - send urine histo ag, histo ab    #AIDS  - ART: Biktarvy (non-formulary)  - Check Viral LOAD   - mepron 1500mg daily PO     #Herpes  - Continue ACV PO    #Hx indeterminate quantiferon - AFB x3 neg last admission  - Place PPD    Spectra [phone]

## 2019-03-11 NOTE — PROGRESS NOTE ADULT - ASSESSMENT
39 y/o Estonian speaking FM w/ newly diagnosed HIV and recently managed in hospital for PCP pneumonia and discharged on (02/19) on HAART therapy p/w subjective fevers and non productive cough. Pt states that she has been compliant w/ her HAARt therapy and has had no recent travel or sick contacts. In the past 3 days she felt feverish w/ tmax of 100.6F at home. This prompted her to come to the ED.  Newly Diagnosed HIV AIDS  with Initial CD4 of 5, and Viral Count [id number].     Todays Events: Filing non Formulary Form for Bictarvy, f/u PPD, Fungitell d-glucan, Histoplasma  Serum antigen, RVP. Orthostatic Negative.     IMPRESSION   HIV AIDS on HAART therapy - possible  Immune reconstitution inflammatory syndrome, C/w HAART, c/w, f/u CD4 count   HO PCP - Status Post treatment   R/O Tb qauntifernGold indeterminant - f/u PPD   B/l intersitial Marking on CXR - c/w mepron and levofloxacin   f/u Fungitell, RVP    Electrolyte Imbalances: Correct as needed  []  Hyponatremia   /   Hypernatremia  []   []  Hypokalemia   /   Hyperkalemia  []   []  Hypochlorhydria   /    Hypochlorhydria  []   []  Hypomagnesemia   /   Hypermagnesemia  []   []  Hypophosphatemia   /   Hyperphosphatemia  []       GI ppx:                                   [x] Not indicated   /   [] Pantoprazole 40mg PO Daily    DVT ppx:  [] Not indicated / [] Heparin 5000mg SubQ / [x] Lovenox 40mg SubQ / [] SCDs    Fluids:   [x] PO  |  [] IVF    Activity:  [X] Assisatnce needed   [X] Increase as Tolerated  /  [] OOB w/ assist  /  [] Bed Rest    BMI:  Height (cm): 157.48 (03-09)  Weight (kg): 74.8 (03-09)  BMI (kg/m2): 30.2 (03-09)        DISPO:  Patient to be discharged when condition(s) optimized.  [x ] From Home     [ ] NH/SNF   [ ] 4A Rehab  [ ] Detox Clinic  [X] Plan Discussion with patient and/or family.  [X] Discussed Case and Plan with the Medical Attending.    CODE STATUS  [X] FULL   /    [] DNR

## 2019-03-12 RX ORDER — BICTEGRAVIR SODIUM, EMTRICITABINE, AND TENOFOVIR ALAFENAMIDE FUMARATE 30; 120; 15 MG/1; MG/1; MG/1
1 TABLET ORAL DAILY
Qty: 0 | Refills: 0 | Status: DISCONTINUED | OUTPATIENT
Start: 2019-03-12 | End: 2019-03-13

## 2019-03-12 RX ADMIN — Medication 400 MILLIGRAM(S): at 21:40

## 2019-03-12 RX ADMIN — Medication 5 MILLIGRAM(S): at 21:40

## 2019-03-12 RX ADMIN — EMTRICITABINE AND TENOFOVIR DISOPROXIL FUMARATE 1 TABLET(S): 200; 300 TABLET, FILM COATED ORAL at 12:12

## 2019-03-12 RX ADMIN — Medication 1 TABLET(S): at 12:11

## 2019-03-12 RX ADMIN — MIDODRINE HYDROCHLORIDE 5 MILLIGRAM(S): 2.5 TABLET ORAL at 05:18

## 2019-03-12 RX ADMIN — DOLUTEGRAVIR SODIUM 50 MILLIGRAM(S): 25 TABLET, FILM COATED ORAL at 12:11

## 2019-03-12 RX ADMIN — Medication 400 MILLIGRAM(S): at 14:54

## 2019-03-12 RX ADMIN — Medication 400 MILLIGRAM(S): at 05:18

## 2019-03-12 RX ADMIN — ENOXAPARIN SODIUM 40 MILLIGRAM(S): 100 INJECTION SUBCUTANEOUS at 21:40

## 2019-03-12 RX ADMIN — ATOVAQUONE 1500 MILLIGRAM(S): 750 SUSPENSION ORAL at 12:11

## 2019-03-12 NOTE — PROGRESS NOTE ADULT - ASSESSMENT
43F from [place]    Symptomatic AIDS CD4 70; 5% HIV-1 RNA Quantitative, Viral Load: [id number] (02.12.19 @ 00:45)  Recent admission for presumed PCP PNA  Indeterminate quantiferon gold (low suspicion for active TB); AFB neg x3 last admission  Toxo IgG neg, CMV IgG +, RPR neg  CXR Hazy opacities with a central predominance are unchanged.  bcx, Ucx NGTD, previous 2/21 AFB cx NG    #Fever & cough: possible IRIS vs. Viral. CXR with hazy opacity.   - On levaquin, plan for 7 day course- change to PO  - send urine histo ag, histo ab    #AIDS  - ART: Biktarvy (non-formulary)- try to obtain from home. If cannot get, Start Descovy + Dolutegravir 50mg daily  - Check Viral LOAD   - mepron 1500mg daily PO     #Herpes  - Continue ACV PO    #Hx indeterminate quantiferon - AFB x3 neg last admission  - Placed PPD, read 3/13    #Dispo   - ANticipate for tomorrow    Spectra [phone]

## 2019-03-12 NOTE — PROGRESS NOTE ADULT - ASSESSMENT
37 y/o Polish speaking FM w/ newly diagnosed HIV and recently managed in hospital for PCP pneumonia and discharged on (02/19) on HAART therapy p/w subjective fevers and non productive cough. Pt states that she has been compliant w/ her HAARt therapy and has had no recent travel or sick contacts. In the past 3 days she felt feverish w/ tmax of 100.6F at home. This prompted her to come to the ED.  Newly Diagnosed HIV AIDS  with Initial CD4 of 5, and Viral Count [id number].     Todays Events: Staring Bictarvy, f/u PPD tomorrow,  f/u Fungitell d-glucan, Histoplasma  Serum antigen, RVP received by lab. DC planing. As per ID CMV positive IgM but IgG also positive. Midodrine Dc today will monitor BP. f/u Am cortisol and TSH.    IMPRESSION   HIV AIDS on HAART therapy - possible  Immune reconstitution inflammatory syndrome, C/w HAART  HO PCP - Status Post treatment   R/O Tb qauntifernGold indeterminant   B/l intersitial Marking on CXR  improved         Plan  HIV AIDS on HAART therapy  - c/w mepron, Levaquin PO, bicktarvy   - f/u Cd4 and viral load   - f/u  PPD tomorrow, Fungitell, RVP, Histoplasma    - Anticipate DC tomorrow     Hypotensive at times  - will monitor off of midodrin   - f/u am cortisol and tsh levels         Electrolyte Imbalances: Correct as needed    GI ppx:                                   [x] Not indicated   /   [] Pantoprazole 40mg PO Daily    DVT ppx:  [] Not indicated / [] Heparin 5000mg SubQ / [x] Lovenox 40mg SubQ / [] SCDs    Fluids:   [x] PO  |  [] IVF    Activity:  [X] Assisatnce needed   [X] Increase as Tolerated  /  [] OOB w/ assist  /  [] Bed Rest    BMI:  Height (cm): 157.48 (03-09)  Weight (kg): 74.8 (03-09)  BMI (kg/m2): 30.2 (03-09)        DISPO:  Patient to be discharged when condition(s) optimized.  [x ] From Home     [ ] NH/SNF   [ ] 4A Rehab  [ ] Detox Clinic  [X] Plan Discussion with patient and/or family.  [X] Discussed Case and Plan with the Medical Attending.    CODE STATUS  [X] FULL   /    [] DNR

## 2019-03-12 NOTE — PROGRESS NOTE ADULT - SUBJECTIVE AND OBJECTIVE BOX
LENGTH OF HOSPITAL STAY: 3d      CHIEF COMPLAINT:   Patient is a 38y old  Female who presents with a chief complaint of fever (12 Mar 2019 12:32)      OVER Past 24hrs:  The patient was seen and examined at bedside there were no acute events during the night. patient feeling better today. She denies fever cough chills or increased sputum production. She also denies symptoms of UTI.      PAST MEDICAL & SURGICAL HISTORY  PAST MEDICAL & SURGICAL HISTORY:  Herpes  HIV (human immunodeficiency virus infection)  No significant past surgical history        ALLERGIES:  No Known Allergies    MEDICATIONS:  STANDING MEDICATIONS  acyclovir   Oral Tab/Cap 400 milliGRAM(s) Oral three times a day  atovaquone Suspension 1500 milliGRAM(s) Oral daily  bictegravir 50 mG/emtricitabine 200 mG/tenofovir alafenamide 25 mG (BIKTARVY) 1 Tablet(s) Oral daily  enoxaparin Injectable 40 milliGRAM(s) SubCutaneous every 24 hours  influenza   Vaccine 0.5 milliLiter(s) IntraMuscular once  levoFLOXacin IVPB 500 milliGRAM(s) IV Intermittent every 24 hours  levoFLOXacin IVPB      melatonin 5 milliGRAM(s) Oral at bedtime  multivitamin 1 Tablet(s) Oral daily    PRN MEDICATIONS  acetaminophen   Tablet .. 650 milliGRAM(s) Oral every 6 hours PRN    VITALS:   T(F): 97.7  HR: 66  BP: 89/53  RR: 16  SpO2: --    PHYSICAL EXAM:  General: No acute distress.  Alert, oriented, interactive, nonfocal. Young  Female     HEENT: Pupils equal, reactive to light symmetrically.    PULM: Clear to auscultation bilaterally, no significant sputum production.     CVS: Regular rate and rhythm, no murmurs, rubs, or gallops.     ABD: Soft, nondistended, nontender, no masses.     EXT: No edema, nontender,     SKIN: Warm and well perfused, no rashes noted.    LABS:                    Culture - Blood (collected 10 Mar 2019 09:21)  Source: .Blood None  Preliminary Report (11 Mar 2019 16:01):    No growth to date.

## 2019-03-12 NOTE — PROGRESS NOTE ADULT - SUBJECTIVE AND OBJECTIVE BOX
BELEN BARTON  38y, Female      OVERNIGHT EVENTS:  afebrile  PPD placed  improving resp sx    ROS negative except as per above    VITALS:  T(F): 98.5, Max: 99.5 (03-11-19 @ 21:20)  HR: 70  BP: 104/56  RR: 18Vital Signs Last 24 Hrs  T(C): 36.9 (12 Mar 2019 04:46), Max: 37.5 (11 Mar 2019 21:20)  T(F): 98.5 (12 Mar 2019 04:46), Max: 99.5 (11 Mar 2019 21:20)  HR: 70 (12 Mar 2019 04:46) (70 - 88)  BP: 104/56 (12 Mar 2019 04:46) (98/55 - 110/57)  BP(mean): --  RR: 18 (12 Mar 2019 04:46) (18 - 18)  SpO2: --    PHYSICAL EXAM  Gen: Awake and alert, non-toxic appearing, NAD  HEENT: NCAT. EOMI. MMM. no thrush, +ulcer/pustule inner lip  Neck: Supple, no cervical LAD  CV: RRR, no murmurs  Lungs: CTAB, no w/r/r  Abd: Soft. NTND  previous exam- : shallow labial ulcers  Extr: wwp, no edema  Skin: no rash  Neuro: No focal deficits  Lines: clean    TESTS & MEASUREMENTS:              Culture - Blood (collected 03-10-19 @ 09:21)  Source: .Blood None  Preliminary Report (03-11-19 @ 16:01):    No growth to date.    Culture - Urine (collected 03-09-19 @ 16:10)  Source: .Urine Clean Catch (Midstream)  Final Report (03-10-19 @ 21:21):    <10,000 CFU/mL Normal Urogenital Sayda    Culture - Blood (collected 03-09-19 @ 14:35)  Source: .Blood Blood-Peripheral  Preliminary Report (03-10-19 @ 21:01):    No growth to date.    Culture - Blood (collected 03-09-19 @ 14:35)  Source: .Blood Blood-Peripheral  Preliminary Report (03-10-19 @ 21:01):    No growth to date.          RADIOLOGY & ADDITIONAL TESTS:    ANTIBIOTICS:  acyclovir   Oral Tab/Cap   800 milliGRAM(s) Oral (03-09-19 @ 15:46)    acyclovir   Oral Tab/Cap   400 milliGRAM(s) Oral (03-12-19 @ 05:18)   400 milliGRAM(s) Oral (03-11-19 @ 22:15)   400 milliGRAM(s) Oral (03-11-19 @ 13:35)   400 milliGRAM(s) Oral (03-11-19 @ 05:54)   400 milliGRAM(s) Oral (03-10-19 @ 21:13)   400 milliGRAM(s) Oral (03-10-19 @ 15:02)   400 milliGRAM(s) Oral (03-10-19 @ 06:29)   400 milliGRAM(s) Oral (03-09-19 @ 22:21)    atovaquone Suspension   1500 milliGRAM(s) Oral (03-12-19 @ 12:11)   1500 milliGRAM(s) Oral (03-11-19 @ 11:34)   1500 milliGRAM(s) Oral (03-10-19 @ 12:24)    cefepime   IVPB   100 mL/Hr IV Intermittent (03-09-19 @ 15:45)    dolutegravir   50 milliGRAM(s) Oral (03-12-19 @ 12:11)   50 milliGRAM(s) Oral (03-11-19 @ 11:33)   50 milliGRAM(s) Oral (03-10-19 @ 12:24)    emtricitabine 200 mG/tenofovir alafenamide 25 mG (DESCOVY) Tablet   1 Tablet(s) Oral (03-12-19 @ 12:12)   1 Tablet(s) Oral (03-11-19 @ 12:36)   1 Tablet(s) Oral (03-10-19 @ 12:23)    fluconAZOLE   Tablet   200 milliGRAM(s) Oral (03-10-19 @ 12:24)    levoFLOXacin IVPB   100 mL/Hr IV Intermittent (03-10-19 @ 09:48)    levoFLOXacin IVPB   100 mL/Hr IV Intermittent (03-12-19 @ 07:58)   100 mL/Hr IV Intermittent (03-11-19 @ 07:38)    trimethoprim  160 mG/sulfamethoxazole 800 mG   1 Tablet(s) Oral (03-10-19 @ 12:25)    vancomycin  IVPB   250 mL/Hr IV Intermittent (03-09-19 @ 15:45)        acyclovir   Oral Tab/Cap 400 milliGRAM(s) Oral three times a day  atovaquone Suspension 1500 milliGRAM(s) Oral daily  bictegravir 50 mG/emtricitabine 200 mG/tenofovir alafenamide 25 mG (BIKTARVY) 1 Tablet(s) Oral daily  emtricitabine 200 mG/tenofovir alafenamide 25 mG (DESCOVY) Tablet 1 Tablet(s) Oral daily  levoFLOXacin IVPB 500 milliGRAM(s) IV Intermittent every 24 hours  levoFLOXacin IVPB

## 2019-03-13 VITALS
SYSTOLIC BLOOD PRESSURE: 102 MMHG | TEMPERATURE: 97 F | HEART RATE: 85 BPM | DIASTOLIC BLOOD PRESSURE: 56 MMHG | RESPIRATION RATE: 18 BRPM

## 2019-03-13 LAB
ANION GAP SERPL CALC-SCNC: 11 MMOL/L — SIGNIFICANT CHANGE UP (ref 7–14)
ANISOCYTOSIS BLD QL: SLIGHT — SIGNIFICANT CHANGE UP
BASOPHILS # BLD AUTO: 0.04 K/UL — SIGNIFICANT CHANGE UP (ref 0–0.2)
BASOPHILS NFR BLD AUTO: 0.9 % — SIGNIFICANT CHANGE UP (ref 0–1)
BUN SERPL-MCNC: 11 MG/DL — SIGNIFICANT CHANGE UP (ref 10–20)
CALCIUM SERPL-MCNC: 9.5 MG/DL — SIGNIFICANT CHANGE UP (ref 8.5–10.1)
CHLORIDE SERPL-SCNC: 103 MMOL/L — SIGNIFICANT CHANGE UP (ref 98–110)
CO2 SERPL-SCNC: 24 MMOL/L — SIGNIFICANT CHANGE UP (ref 17–32)
CORTIS AM PEAK SERPL-MCNC: 8.6 UG/DL — SIGNIFICANT CHANGE UP (ref 6–18.4)
CREAT SERPL-MCNC: 0.9 MG/DL — SIGNIFICANT CHANGE UP (ref 0.7–1.5)
EOSINOPHIL # BLD AUTO: 0.18 K/UL — SIGNIFICANT CHANGE UP (ref 0–0.7)
EOSINOPHIL NFR BLD AUTO: 4.3 % — SIGNIFICANT CHANGE UP (ref 0–8)
GIANT PLATELETS BLD QL SMEAR: PRESENT — SIGNIFICANT CHANGE UP
GLUCOSE SERPL-MCNC: 171 MG/DL — HIGH (ref 70–99)
H CAPSUL AG SPEC-ACNC: SIGNIFICANT CHANGE UP
H CAPSUL AG UR QL IA: SIGNIFICANT CHANGE UP
HCT VFR BLD CALC: 38.2 % — SIGNIFICANT CHANGE UP (ref 37–47)
HGB BLD-MCNC: 12.3 G/DL — SIGNIFICANT CHANGE UP (ref 12–16)
LYMPHOCYTES # BLD AUTO: 0.52 K/UL — LOW (ref 1.2–3.4)
LYMPHOCYTES # BLD AUTO: 12.2 % — LOW (ref 20.5–51.1)
LYMPHOCYTES # SPEC AUTO: 0.9 % — HIGH (ref 0–0)
MAGNESIUM SERPL-MCNC: 2.2 MG/DL — SIGNIFICANT CHANGE UP (ref 1.8–2.4)
MANUAL SMEAR VERIFICATION: SIGNIFICANT CHANGE UP
MCHC RBC-ENTMCNC: 31.1 PG — HIGH (ref 27–31)
MCHC RBC-ENTMCNC: 32.2 G/DL — SIGNIFICANT CHANGE UP (ref 32–37)
MCV RBC AUTO: 96.5 FL — SIGNIFICANT CHANGE UP (ref 81–99)
METAMYELOCYTES # FLD: 1.7 % — HIGH (ref 0–0)
MONOCYTES # BLD AUTO: 0.33 K/UL — SIGNIFICANT CHANGE UP (ref 0.1–0.6)
MONOCYTES NFR BLD AUTO: 7.8 % — SIGNIFICANT CHANGE UP (ref 1.7–9.3)
NEUTROPHILS # BLD AUTO: 1.55 K/UL — SIGNIFICANT CHANGE UP (ref 1.4–6.5)
NEUTROPHILS NFR BLD AUTO: 36.5 % — LOW (ref 42.2–75.2)
PHOSPHATE SERPL-MCNC: 4.5 MG/DL — SIGNIFICANT CHANGE UP (ref 2.1–4.9)
PLAT MORPH BLD: ABNORMAL
PLATELET # BLD AUTO: 337 K/UL — SIGNIFICANT CHANGE UP (ref 130–400)
POTASSIUM SERPL-MCNC: 4.2 MMOL/L — SIGNIFICANT CHANGE UP (ref 3.5–5)
POTASSIUM SERPL-SCNC: 4.2 MMOL/L — SIGNIFICANT CHANGE UP (ref 3.5–5)
RBC # BLD: 3.96 M/UL — LOW (ref 4.2–5.4)
RBC # FLD: 15.3 % — HIGH (ref 11.5–14.5)
RBC BLD AUTO: ABNORMAL
SODIUM SERPL-SCNC: 138 MMOL/L — SIGNIFICANT CHANGE UP (ref 135–146)
T4 AB SER-ACNC: 10.7 UG/DL — SIGNIFICANT CHANGE UP (ref 4.6–12)
TSH SERPL-MCNC: 3.11 UIU/ML — SIGNIFICANT CHANGE UP (ref 0.27–4.2)
VARIANT LYMPHS # BLD: 35.7 % — HIGH (ref 0–5)
WBC # BLD: 4.26 K/UL — LOW (ref 4.8–10.8)
WBC # FLD AUTO: 4.26 K/UL — LOW (ref 4.8–10.8)

## 2019-03-13 RX ORDER — CHLORHEXIDINE GLUCONATE 213 G/1000ML
1 SOLUTION TOPICAL
Qty: 0 | Refills: 0 | Status: DISCONTINUED | OUTPATIENT
Start: 2019-03-13 | End: 2019-03-13

## 2019-03-13 RX ORDER — ATOVAQUONE 750 MG/5ML
10 SUSPENSION ORAL
Qty: 300 | Refills: 0 | OUTPATIENT
Start: 2019-03-13 | End: 2019-04-11

## 2019-03-13 RX ORDER — VALACYCLOVIR 500 MG/1
1 TABLET, FILM COATED ORAL
Qty: 60 | Refills: 0 | OUTPATIENT
Start: 2019-03-13 | End: 2019-04-11

## 2019-03-13 RX ORDER — CIPROFLOXACIN LACTATE 400MG/40ML
1 VIAL (ML) INTRAVENOUS
Qty: 5 | Refills: 0 | OUTPATIENT
Start: 2019-03-13 | End: 2019-03-17

## 2019-03-13 RX ORDER — FOLIC ACID 0.8 MG
1 TABLET ORAL
Qty: 30 | Refills: 0 | OUTPATIENT
Start: 2019-03-13 | End: 2019-04-11

## 2019-03-13 RX ORDER — ATOVAQUONE 750 MG/5ML
5 SUSPENSION ORAL
Qty: 250 | Refills: 0 | OUTPATIENT
Start: 2019-03-13 | End: 2019-03-26

## 2019-03-13 RX ORDER — ACYCLOVIR SODIUM 500 MG
1 VIAL (EA) INTRAVENOUS
Qty: 90 | Refills: 0 | OUTPATIENT
Start: 2019-03-13 | End: 2019-04-11

## 2019-03-13 RX ORDER — BICTEGRAVIR SODIUM, EMTRICITABINE, AND TENOFOVIR ALAFENAMIDE FUMARATE 30; 120; 15 MG/1; MG/1; MG/1
1 TABLET ORAL
Qty: 30 | Refills: 0 | OUTPATIENT
Start: 2019-03-13 | End: 2019-04-11

## 2019-03-13 RX ORDER — VALACYCLOVIR 500 MG/1
1 TABLET, FILM COATED ORAL
Qty: 60 | Refills: 1 | OUTPATIENT
Start: 2019-03-13 | End: 2019-07-10

## 2019-03-13 RX ADMIN — Medication 400 MILLIGRAM(S): at 05:55

## 2019-03-13 RX ADMIN — ATOVAQUONE 1500 MILLIGRAM(S): 750 SUSPENSION ORAL at 13:25

## 2019-03-13 RX ADMIN — Medication 1 TABLET(S): at 13:25

## 2019-03-13 RX ADMIN — TUBERCULIN PURIFIED PROTEIN DERIVATIVE 5 UNIT(S): 5 INJECTION, SOLUTION INTRADERMAL at 16:46

## 2019-03-13 RX ADMIN — Medication 400 MILLIGRAM(S): at 13:25

## 2019-03-13 RX ADMIN — BICTEGRAVIR SODIUM, EMTRICITABINE, AND TENOFOVIR ALAFENAMIDE FUMARATE 1 TABLET(S): 30; 120; 15 TABLET ORAL at 13:26

## 2019-03-13 NOTE — CHART NOTE - NSCHARTNOTEFT_GEN_A_CORE
<<<RESIDENT DISCHARGE NOTE>>>     BELEN BARTON  MRN-[id number]  37 y/o Georgian speaking FM w/ newly diagnosed HIV and recently managed in hospital for PCP pneumonia and discharged on (02/19) on HAART therapy p/w subjective fevers and non productive cough. Pt states that she has been compliant w/ her HAARt therapy and has had no recent travel or sick contacts. In the past 3 days she felt feverish w/ tmax of 100.6F at home. This prompted her to come to the ED.  Newly Diagnosed HIV AIDS  with Initial CD4 of 5, and Viral Count [id number].     Todays Events: PPD negative, OP ID follow up with Dr. Brewer , DC with Mepron, Levaquin, and Valtrex.   f/u Am cortisol and TSH. Patient is hemodynamically stable with no systemic signs of infection and stable for discharge today.     IMPRESSION   HIV AIDS on HAART therapy - possible  Immune reconstitution inflammatory syndrome, C/w HAART  HO PCP - Status Post treatment   R/O Tb qauntifernGold indeterminant   B/l intersitial Marking on CXR  improved         Plan  HIV AIDS on HAART therapy  - c/w mepron, Levaquin PO, bicktarvy   - Cd4 and viral load out patient f/u   -   PPD  negative , f/u  Fungitell, RVP, Histoplasma   results OP   -     Low systolic BP- stable at baseline    VITAL SIGNS:  T(F): 97 (03-13-19 @ 13:57), Max: 97.3 (03-12-19 @ 21:31)  HR: 85 (03-13-19 @ 13:57)  BP: 102/56 (03-13-19 @ 13:57)  SpO2: 98% (03-13-19 @ 08:12)      PHYSICAL EXAMINATION:  General: No acute distress.  Alert, oriented, interactive, nonfocal. Young  Female     HEENT: Pupils equal, reactive to light symmetrically.    PULM: Clear to auscultation bilaterally, no significant sputum production.     CVS: Regular rate and rhythm, no murmurs, rubs, or gallops.     ABD: Soft, nondistended, nontender, no masses.     EXT: No edema, nontender,     SKIN: Warm and well perfused, no rashes noted.    TEST RESULTS:                        12.3   4.26  )-----------( 337      ( 13 Mar 2019 09:30 )             38.2       03-13    138  |  103  |  11  ----------------------------<  171<H>  4.2   |  24  |  0.9    Ca    9.5      13 Mar 2019 09:30  Phos  4.5     03-13  Mg     2.2     03-13        FINAL DISCHARGE INTERVIEW:  Resident(s) Present: (Name:_______Teri Can ______), RN Present: (Name:  _____Rosamaria______)    DISCHARGE MEDICATION RECONCILIATION  reviewed with Attending (Name:______Dr. Cota _____)    DISPOSITION:   [x  ] Home,    [  ] Home with Visiting Nursing Services,   [    ]  SNF/ NH,    [   ] Acute Rehab (4A),   [   ] Other (Specify:_________)

## 2019-03-13 NOTE — PROGRESS NOTE ADULT - SUBJECTIVE AND OBJECTIVE BOX
MICK BELEN  38y, Female      OVERNIGHT EVENTS:  some sore throat, rhinorrhea  PPD neg  vaginal ulcers improving    ROS negative except as per above    VITALS:  T(F): 96.8, Max: 97.7 (03-12-19 @ 13:53)  HR: 63  BP: 88/53  RR: 18Vital Signs Last 24 Hrs  T(C): 36 (13 Mar 2019 04:52), Max: 36.5 (12 Mar 2019 13:53)  T(F): 96.8 (13 Mar 2019 04:52), Max: 97.7 (12 Mar 2019 13:53)  HR: 63 (13 Mar 2019 04:52) (63 - 78)  BP: 88/53 (13 Mar 2019 04:52) (88/53 - 94/54)  BP(mean): --  RR: 18 (13 Mar 2019 04:52) (16 - 18)  SpO2: 98% (13 Mar 2019 08:12) (97% - 98%)    PHYSICAL EXAM  Gen: Awake and alert, non-toxic appearing, NAD  HEENT: NCAT. EOMI. MMM. no thrush  Neck: Supple, no cervical LAD  CV: RRR, no murmurs  Lungs: CTAB, no w/r/r  Abd: Soft. NTND  : shallow labial ulcers decreased   Extr: wwp, no edema  Skin: no rash  Neuro: No focal deficits  Lines: clean      TESTS & MEASUREMENTS:                        12.3   4.26  )-----------( 337      ( 13 Mar 2019 09:30 )             38.2     03-13    138  |  103  |  11  ----------------------------<  171<H>  4.2   |  24  |  0.9    Ca    9.5      13 Mar 2019 09:30  Phos  4.5     03-13  Mg     2.2     03-13          Culture - Blood (collected 03-10-19 @ 09:21)  Source: .Blood None  Preliminary Report (03-11-19 @ 16:01):    No growth to date.    Culture - Urine (collected 03-09-19 @ 16:10)  Source: .Urine Clean Catch (Midstream)  Final Report (03-10-19 @ 21:21):    <10,000 CFU/mL Normal Urogenital Sayda    Culture - Blood (collected 03-09-19 @ 14:35)  Source: .Blood Blood-Peripheral  Preliminary Report (03-10-19 @ 21:01):    No growth to date.    Culture - Blood (collected 03-09-19 @ 14:35)  Source: .Blood Blood-Peripheral  Preliminary Report (03-10-19 @ 21:01):    No growth to date.          RADIOLOGY & ADDITIONAL TESTS:    ANTIBIOTICS:  acyclovir   Oral Tab/Cap   800 milliGRAM(s) Oral (03-09-19 @ 15:46)    acyclovir   Oral Tab/Cap   400 milliGRAM(s) Oral (03-13-19 @ 05:55)   400 milliGRAM(s) Oral (03-12-19 @ 21:40)   400 milliGRAM(s) Oral (03-12-19 @ 14:54)   400 milliGRAM(s) Oral (03-12-19 @ 05:18)   400 milliGRAM(s) Oral (03-11-19 @ 22:15)   400 milliGRAM(s) Oral (03-11-19 @ 13:35)   400 milliGRAM(s) Oral (03-11-19 @ 05:54)   400 milliGRAM(s) Oral (03-10-19 @ 21:13)   400 milliGRAM(s) Oral (03-10-19 @ 15:02)   400 milliGRAM(s) Oral (03-10-19 @ 06:29)   400 milliGRAM(s) Oral (03-09-19 @ 22:21)    atovaquone Suspension   1500 milliGRAM(s) Oral (03-12-19 @ 12:11)   1500 milliGRAM(s) Oral (03-11-19 @ 11:34)   1500 milliGRAM(s) Oral (03-10-19 @ 12:24)    cefepime   IVPB   100 mL/Hr IV Intermittent (03-09-19 @ 15:45)    dolutegravir   50 milliGRAM(s) Oral (03-12-19 @ 12:11)   50 milliGRAM(s) Oral (03-11-19 @ 11:33)   50 milliGRAM(s) Oral (03-10-19 @ 12:24)    emtricitabine 200 mG/tenofovir alafenamide 25 mG (DESCOVY) Tablet   1 Tablet(s) Oral (03-12-19 @ 12:12)   1 Tablet(s) Oral (03-11-19 @ 12:36)   1 Tablet(s) Oral (03-10-19 @ 12:23)    fluconAZOLE   Tablet   200 milliGRAM(s) Oral (03-10-19 @ 12:24)    levoFLOXacin  Tablet   500 milliGRAM(s) Oral (03-12-19 @ 17:46)    levoFLOXacin IVPB   100 mL/Hr IV Intermittent (03-10-19 @ 09:48)    levoFLOXacin IVPB   100 mL/Hr IV Intermittent (03-12-19 @ 07:58)   100 mL/Hr IV Intermittent (03-11-19 @ 07:38)    trimethoprim  160 mG/sulfamethoxazole 800 mG   1 Tablet(s) Oral (03-10-19 @ 12:25)    vancomycin  IVPB   250 mL/Hr IV Intermittent (03-09-19 @ 15:45)        acyclovir   Oral Tab/Cap 400 milliGRAM(s) Oral three times a day  atovaquone Suspension 1500 milliGRAM(s) Oral daily  bictegravir 50 mG/emtricitabine 200 mG/tenofovir alafenamide 25 mG (BIKTARVY) 1 Tablet(s) Oral daily  levoFLOXacin  Tablet 500 milliGRAM(s) Oral every 24 hours

## 2019-03-13 NOTE — PROGRESS NOTE ADULT - ASSESSMENT
43F from [place]    Symptomatic AIDS CD4 70; 5% HIV-1 RNA Quantitative, Viral Load: [id number] (02.12.19 @ 00:45)  Recent admission for presumed PCP PNA  Toxo IgG neg, CMV IgG +, RPR neg  CXR Hazy opacities with a central predominance   bcx, Ucx NGTD, previous 2/21 AFB cx NG    #Fever & cough: possible IRIS vs. PNA as CXR with hazy opacity.   - On levaquin, plan for 7 day course PO  - f/u urine histo ag, histo ab    #AIDS  - ART: Biktarvy- needs a refill  - f/u VL   - mepron 1500mg daily PO - needs a refill    #Herpes  - CHANGE ACV to Valtrex for ease of dosing, 1g BID while active infection then 1g daily for prophylaxis    #Hx indeterminate quantiferon/PPD neg - AFB x3 neg last admission    #Systolic hypotension- asymptomatic  - AM cortisol pending    #Dispo   - D/C home today  - Has appt at - ID follow-up [facility] Richard Murphy [phone] 3/20 2:45 PM    Spectra [phone]

## 2019-03-14 LAB
CULTURE RESULTS: SIGNIFICANT CHANGE UP
CULTURE RESULTS: SIGNIFICANT CHANGE UP
FUNGITELL: >500 PG/ML — HIGH
INTERFERON GAMMA, BLOOD: 19 PG/ML — HIGH
SPECIMEN SOURCE: SIGNIFICANT CHANGE UP
SPECIMEN SOURCE: SIGNIFICANT CHANGE UP

## 2019-03-15 LAB
CULTURE RESULTS: SIGNIFICANT CHANGE UP
HIV-1 VIRAL LOAD RESULT: DETECTED
HIV-1 VIRAL LOAD RESULT: DETECTED
HIV1 RNA # SERPL NAA+PROBE: SIGNIFICANT CHANGE UP
HIV1 RNA # SERPL NAA+PROBE: SIGNIFICANT CHANGE UP
HIV1 RNA SERPL NAA+PROBE-ACNC: DETECTED
HIV1 RNA SERPL NAA+PROBE-ACNC: DETECTED
HIV1 RNA SERPL NAA+PROBE-LOG#: 3.42 LG COP/ML — SIGNIFICANT CHANGE UP
HIV1 RNA SERPL NAA+PROBE-LOG#: 3.71 LG COP/ML — SIGNIFICANT CHANGE UP
SPECIMEN SOURCE: SIGNIFICANT CHANGE UP

## 2019-03-16 LAB — H CAPSUL AG SER IA-MCNC: SIGNIFICANT CHANGE UP

## 2019-03-18 DIAGNOSIS — A41.9 SEPSIS, UNSPECIFIED ORGANISM: ICD-10-CM

## 2019-03-18 DIAGNOSIS — B20 HUMAN IMMUNODEFICIENCY VIRUS [HIV] DISEASE: ICD-10-CM

## 2019-03-18 DIAGNOSIS — K12.1 OTHER FORMS OF STOMATITIS: ICD-10-CM

## 2019-03-18 DIAGNOSIS — J40 BRONCHITIS, NOT SPECIFIED AS ACUTE OR CHRONIC: ICD-10-CM

## 2019-03-18 DIAGNOSIS — I95.9 HYPOTENSION, UNSPECIFIED: ICD-10-CM

## 2019-03-20 ENCOUNTER — OUTPATIENT (OUTPATIENT)
Dept: OUTPATIENT SERVICES | Facility: HOSPITAL | Age: 39
LOS: 1 days | Discharge: HOME | End: 2019-03-20

## 2019-03-20 DIAGNOSIS — B20 HUMAN IMMUNODEFICIENCY VIRUS [HIV] DISEASE: ICD-10-CM

## 2019-03-21 PROBLEM — B20 HUMAN IMMUNODEFICIENCY VIRUS [HIV] DISEASE: Chronic | Status: ACTIVE | Noted: 2019-03-09

## 2019-03-21 PROBLEM — B00.9 HERPESVIRAL INFECTION, UNSPECIFIED: Chronic | Status: ACTIVE | Noted: 2019-03-09

## 2019-04-04 ENCOUNTER — OUTPATIENT (OUTPATIENT)
Dept: OUTPATIENT SERVICES | Facility: HOSPITAL | Age: 39
LOS: 1 days | Discharge: HOME | End: 2019-04-04

## 2019-04-04 DIAGNOSIS — B20 HUMAN IMMUNODEFICIENCY VIRUS [HIV] DISEASE: ICD-10-CM

## 2019-04-06 LAB
CULTURE RESULTS: SIGNIFICANT CHANGE UP
SPECIMEN SOURCE: SIGNIFICANT CHANGE UP

## 2019-04-10 LAB
CULTURE RESULTS: SIGNIFICANT CHANGE UP
CULTURE RESULTS: SIGNIFICANT CHANGE UP
SPECIMEN SOURCE: SIGNIFICANT CHANGE UP
SPECIMEN SOURCE: SIGNIFICANT CHANGE UP

## 2019-04-13 LAB
CULTURE RESULTS: SIGNIFICANT CHANGE UP
SPECIMEN SOURCE: SIGNIFICANT CHANGE UP

## 2019-05-02 ENCOUNTER — OUTPATIENT (OUTPATIENT)
Dept: OUTPATIENT SERVICES | Facility: HOSPITAL | Age: 39
LOS: 1 days | Discharge: HOME | End: 2019-05-02

## 2019-05-02 DIAGNOSIS — B20 HUMAN IMMUNODEFICIENCY VIRUS [HIV] DISEASE: ICD-10-CM

## 2019-06-13 ENCOUNTER — OUTPATIENT (OUTPATIENT)
Dept: OUTPATIENT SERVICES | Facility: HOSPITAL | Age: 39
LOS: 1 days | Discharge: HOME | End: 2019-06-13

## 2019-06-13 DIAGNOSIS — B20 HUMAN IMMUNODEFICIENCY VIRUS [HIV] DISEASE: ICD-10-CM

## 2019-06-29 ENCOUNTER — EMERGENCY (EMERGENCY)
Facility: HOSPITAL | Age: 39
LOS: 0 days | Discharge: HOME | End: 2019-06-29
Attending: STUDENT IN AN ORGANIZED HEALTH CARE EDUCATION/TRAINING PROGRAM | Admitting: STUDENT IN AN ORGANIZED HEALTH CARE EDUCATION/TRAINING PROGRAM
Payer: MEDICAID

## 2019-06-29 VITALS
OXYGEN SATURATION: 96 % | DIASTOLIC BLOOD PRESSURE: 66 MMHG | SYSTOLIC BLOOD PRESSURE: 114 MMHG | RESPIRATION RATE: 17 BRPM | HEART RATE: 75 BPM | TEMPERATURE: 98 F

## 2019-06-29 VITALS — WEIGHT: 187.39 LBS

## 2019-06-29 DIAGNOSIS — J02.9 ACUTE PHARYNGITIS, UNSPECIFIED: ICD-10-CM

## 2019-06-29 DIAGNOSIS — Z79.84 LONG TERM (CURRENT) USE OF ORAL HYPOGLYCEMIC DRUGS: ICD-10-CM

## 2019-06-29 DIAGNOSIS — Z79.2 LONG TERM (CURRENT) USE OF ANTIBIOTICS: ICD-10-CM

## 2019-06-29 DIAGNOSIS — Z79.899 OTHER LONG TERM (CURRENT) DRUG THERAPY: ICD-10-CM

## 2019-06-29 PROCEDURE — 99283 EMERGENCY DEPT VISIT LOW MDM: CPT | Mod: 25

## 2019-06-29 RX ORDER — ACETAMINOPHEN 500 MG
650 TABLET ORAL ONCE
Refills: 0 | Status: COMPLETED | OUTPATIENT
Start: 2019-06-29 | End: 2019-06-29

## 2019-06-29 RX ADMIN — Medication 650 MILLIGRAM(S): at 08:05

## 2019-06-29 NOTE — ED PROVIDER NOTE - ATTENDING CONTRIBUTION TO CARE
39 y/o F PMH HIV on ART, last CD4 70 3/13/19, p/w sore throat, intermittent fever and malaise x 1wk. On clarification, pt has no cough (as opposed to resident and triage notes). ROS PE above. IMP: pharyngitis, reassuring exam. P: abx, supportive care, pmd fup. Pt stable for dc w/ PMD f/up, and care as discussed.  Pt/ family understands plan and signs and symptoms for ED return.

## 2019-06-29 NOTE — ED PROVIDER NOTE - PHYSICAL EXAMINATION
CONSTITUTIONAL: Well-developed; well-nourished; in no acute distress, speaking in full sentences  SKIN: warm, dry  HEAD: Normocephalic; atraumatic  EYES: PERRL, EOMI, no conjunctival erythema  ENT: No nasal discharge; airway clear, mucous membranes moist, slight posterior erythema, no tonsilar exudates   NECK: Supple; non tender, FROM, no meningeal signs   CARD: +S1, S2 no murmurs, gallops, or rubs. Regular rate and rhythm. radial 2+  RESP: No wheezes, rales or rhonchi. CTABL  ABD: soft ntnd, no rebound, no guarding, no rigidity  EXT: moves all extremities, ambulates wo assistance No clubbing, cyanosis or edema.   NEURO: Alert, oriented, grossly unremarkable, no focal deficits, cn ii-xii grossly intact  PSYCH: Cooperative, appropriate

## 2019-06-29 NOTE — ED PROVIDER NOTE - NS ED ROS FT
General: +fevers  Eyes:  No eye pain  ENMT:  No ear pain  Cardiac:  No chest pain  Respiratory:  No cough or respiratory distress  GI:  No abdominal pain.  :  No dysuria  MS:  No back pain.  Neuro: No LOC.  Skin:  No skin rash.   Endocrine: No  diabetes.

## 2019-06-29 NOTE — ED PROVIDER NOTE - OBJECTIVE STATEMENT
37yo f pmhx hiv with regular follow up with dr. figueroa, reports compliance with medications, told her viral load is very low presents CC 1 week of sore throat associated w/ malaise, productive cough, fever. no sick contacts. sx improve with tylenol. pt denies neck pain, trouble swallowing or speaking.

## 2019-06-29 NOTE — ED PROVIDER NOTE - NSFOLLOWUPINSTRUCTIONS_ED_ALL_ED_FT
Follow up with your primary care doctor and/or the doctors recommended in 1-3 days     Pharyngitis    Pharyngitis is inflammation of your pharynx, which is typically caused by a viral or bacterial infection. Pharyngitis can be contagious and may spread from person to person through intimate contact, coughing, sneezing, or sharing personal items and utensils. Symptoms of pharyngitis may include sore throat, fever, headache, or swollen lymph nodes. If you are prescribed antibiotics, make sure you finish them even if you start to feel better. Gargle with salt water as often as every 1-2 hours to soothe your throat. Throat lozenges (if you are not at risk for choking) or sprays may be used to soothe your throat.    SEEK IMMEDIATE MEDICAL CARE IF YOU HAVE ANY OF THE FOLLOWING SYMPTOMS: neck stiffness, drooling, hoarseness or change in voice, inability to swallow liquids, vomiting, or trouble breathing.

## 2019-06-29 NOTE — ED PROVIDER NOTE - CLINICAL SUMMARY MEDICAL DECISION MAKING FREE TEXT BOX
IMP: pharyngitis, reassuring exam. P: abx, supportive care, pmd fup. Pt stable for dc w/ PMD f/up, and care as discussed.  Pt/ family understands plan and signs and symptoms for ED return.

## 2019-08-08 ENCOUNTER — OUTPATIENT (OUTPATIENT)
Dept: OUTPATIENT SERVICES | Facility: HOSPITAL | Age: 39
LOS: 1 days | Discharge: HOME | End: 2019-08-08

## 2019-08-09 DIAGNOSIS — B20 HUMAN IMMUNODEFICIENCY VIRUS [HIV] DISEASE: ICD-10-CM

## 2019-11-07 ENCOUNTER — OUTPATIENT (OUTPATIENT)
Dept: OUTPATIENT SERVICES | Facility: HOSPITAL | Age: 39
LOS: 1 days | Discharge: HOME | End: 2019-11-07

## 2019-11-07 DIAGNOSIS — B20 HUMAN IMMUNODEFICIENCY VIRUS [HIV] DISEASE: ICD-10-CM

## 2020-01-22 ENCOUNTER — OUTPATIENT (OUTPATIENT)
Dept: OUTPATIENT SERVICES | Facility: HOSPITAL | Age: 40
LOS: 1 days | Discharge: HOME | End: 2020-01-22
Payer: MEDICAID

## 2020-01-22 PROCEDURE — 92002 INTRM OPH EXAM NEW PATIENT: CPT

## 2020-01-25 DIAGNOSIS — B20 HUMAN IMMUNODEFICIENCY VIRUS [HIV] DISEASE: ICD-10-CM

## 2020-01-25 DIAGNOSIS — H52.203 UNSPECIFIED ASTIGMATISM, BILATERAL: ICD-10-CM

## 2020-01-25 DIAGNOSIS — H53.023 REFRACTIVE AMBLYOPIA, BILATERAL: ICD-10-CM

## 2020-02-24 ENCOUNTER — OUTPATIENT (OUTPATIENT)
Dept: OUTPATIENT SERVICES | Facility: HOSPITAL | Age: 40
LOS: 1 days | Discharge: HOME | End: 2020-02-24
Payer: MEDICAID

## 2020-02-24 PROCEDURE — 92250 FUNDUS PHOTOGRAPHY W/I&R: CPT | Mod: 26

## 2020-02-24 PROCEDURE — 99213 OFFICE O/P EST LOW 20 MIN: CPT

## 2020-08-27 ENCOUNTER — OUTPATIENT (OUTPATIENT)
Dept: OUTPATIENT SERVICES | Facility: HOSPITAL | Age: 40
LOS: 1 days | Discharge: HOME | End: 2020-08-27

## 2020-08-27 ENCOUNTER — APPOINTMENT (OUTPATIENT)
Dept: OBGYN | Facility: CLINIC | Age: 40
End: 2020-08-27
Payer: MEDICAID

## 2020-08-27 VITALS
WEIGHT: 231.19 LBS | DIASTOLIC BLOOD PRESSURE: 80 MMHG | HEIGHT: 67 IN | BODY MASS INDEX: 36.29 KG/M2 | SYSTOLIC BLOOD PRESSURE: 130 MMHG

## 2020-08-27 PROCEDURE — 99213 OFFICE O/P EST LOW 20 MIN: CPT

## 2020-08-27 RX ADMIN — AZITHROMYCIN 0 GM: 1 POWDER, FOR SUSPENSION ORAL at 00:00

## 2020-08-27 NOTE — HISTORY OF PRESENT ILLNESS
[Good] : being in good health [Approximately ___ (Month)] : the LMP was approximately [unfilled] month(s) ago [Contraception] : uses contraception [Sexually Active] : is sexually active [Monogamous] : is monogamous [de-identified] : Mirena IUD inserted 2019, inconsistent condom use

## 2020-08-27 NOTE — PHYSICAL EXAM
[No Bleeding] : there was no active vaginal bleeding [Normal] : uterus [Discharge] : had a ~M discharge [IUD String] : had an IUD string protruding out [Normal Position] : in a normal position [Uterine Adnexae] : were not tender and not enlarged [Erosion] : had no erosions [Motion Tenderness] : there was no cervical motion tenderness [Tenderness] : nontender [Enlarged ___ wks] : not enlarged

## 2020-08-31 ENCOUNTER — OUTPATIENT (OUTPATIENT)
Dept: OUTPATIENT SERVICES | Facility: HOSPITAL | Age: 40
LOS: 1 days | Discharge: HOME | End: 2020-08-31
Payer: MEDICAID

## 2020-08-31 PROCEDURE — 92012 INTRM OPH EXAM EST PATIENT: CPT

## 2020-09-02 DIAGNOSIS — H16.143 PUNCTATE KERATITIS, BILATERAL: ICD-10-CM

## 2020-09-02 DIAGNOSIS — T26.62XA CORROSION OF CORNEA AND CONJUNCTIVAL SAC, LEFT EYE, INITIAL ENCOUNTER: ICD-10-CM

## 2020-09-02 DIAGNOSIS — H10.211 ACUTE TOXIC CONJUNCTIVITIS, RIGHT EYE: ICD-10-CM

## 2020-09-09 ENCOUNTER — OUTPATIENT (OUTPATIENT)
Dept: OUTPATIENT SERVICES | Facility: HOSPITAL | Age: 40
LOS: 1 days | Discharge: HOME | End: 2020-09-09
Payer: MEDICAID

## 2020-09-09 PROCEDURE — 99213 OFFICE O/P EST LOW 20 MIN: CPT

## 2020-10-29 ENCOUNTER — APPOINTMENT (OUTPATIENT)
Dept: OBGYN | Facility: CLINIC | Age: 40
End: 2020-10-29
Payer: MEDICAID

## 2020-10-29 ENCOUNTER — OUTPATIENT (OUTPATIENT)
Dept: OUTPATIENT SERVICES | Facility: HOSPITAL | Age: 40
LOS: 1 days | Discharge: HOME | End: 2020-10-29

## 2020-10-29 ENCOUNTER — RESULT CHARGE (OUTPATIENT)
Age: 40
End: 2020-10-29

## 2020-10-29 VITALS
WEIGHT: 233 LBS | HEIGHT: 67 IN | BODY MASS INDEX: 36.57 KG/M2 | DIASTOLIC BLOOD PRESSURE: 66 MMHG | SYSTOLIC BLOOD PRESSURE: 120 MMHG

## 2020-10-29 DIAGNOSIS — Z30.431 ENCOUNTER FOR ROUTINE CHECKING OF INTRAUTERINE CONTRACEPTIVE DEVICE: ICD-10-CM

## 2020-10-29 DIAGNOSIS — N90.89 OTHER SPECIFIED NONINFLAMMATORY DISORDERS OF VULVA AND PERINEUM: ICD-10-CM

## 2020-10-29 DIAGNOSIS — B97.7 PAPILLOMAVIRUS AS THE CAUSE OF DISEASES CLASSIFIED ELSEWHERE: ICD-10-CM

## 2020-10-29 DIAGNOSIS — A56.09 OTHER CHLAMYDIAL INFECTION OF LOWER GENITOURINARY TRACT: ICD-10-CM

## 2020-10-29 LAB
HCG UR QL: NEGATIVE
QUALITY CONTROL: NO

## 2020-10-29 PROCEDURE — 57454 BX/CURETT OF CERVIX W/SCOPE: CPT

## 2020-10-29 RX ORDER — CLOTRIMAZOLE AND BETAMETHASONE DIPROPIONATE 10; .5 MG/G; MG/G
1-0.05 CREAM TOPICAL
Qty: 1 | Refills: 1 | Status: ACTIVE | COMMUNITY
Start: 2020-10-29 | End: 1900-01-01

## 2020-11-04 LAB
A VAGINAE DNA VAG QL NAA+PROBE: ABNORMAL
BVAB2 DNA VAG QL NAA+PROBE: NORMAL
C KRUSEI DNA VAG QL NAA+PROBE: NEGATIVE
C TRACH RRNA SPEC QL NAA+PROBE: NEGATIVE
HERPES SIMPLEX 1 DNA: NOT DETECTED
HERPES SIMPLEX 2 DNA: NOT DETECTED
HERPES SIMPLEX SPECIMEN SOURCE: NORMAL
MEGA1 DNA VAG QL NAA+PROBE: NORMAL
N GONORRHOEA RRNA SPEC QL NAA+PROBE: NEGATIVE
T VAGINALIS RRNA SPEC QL NAA+PROBE: NEGATIVE

## 2020-11-04 RX ORDER — AZITHROMYCIN 1 G/1
1 POWDER, FOR SUSPENSION ORAL
Qty: 1 | Refills: 0 | Status: DISCONTINUED | COMMUNITY
Start: 2020-08-27 | End: 2020-11-04

## 2020-11-13 ENCOUNTER — ASOB RESULT (OUTPATIENT)
Age: 40
End: 2020-11-13

## 2020-11-13 ENCOUNTER — APPOINTMENT (OUTPATIENT)
Dept: ANTEPARTUM | Facility: CLINIC | Age: 40
End: 2020-11-13
Payer: MEDICAID

## 2020-11-13 PROCEDURE — 76830 TRANSVAGINAL US NON-OB: CPT | Mod: 26

## 2020-11-19 ENCOUNTER — APPOINTMENT (OUTPATIENT)
Dept: OBGYN | Facility: CLINIC | Age: 40
End: 2020-11-19

## 2020-11-25 ENCOUNTER — NON-APPOINTMENT (OUTPATIENT)
Age: 40
End: 2020-11-25

## 2021-01-05 ENCOUNTER — APPOINTMENT (OUTPATIENT)
Dept: PODIATRY | Facility: CLINIC | Age: 41
End: 2021-01-05

## 2021-01-06 NOTE — DISCHARGE NOTE ADULT - FUNCTIONAL SCREEN CURRENT LEVEL: DRESSING, MLM
0 = independent Duration Of Freeze Thaw-Cycle (Seconds): 3 Post-Care Instructions: I reviewed with the patient in detail post-care instructions. Patient is to wear sunprotection, and avoid picking at any of the treated lesions. Pt may apply Vaseline to crusted or scabbing areas. Consent: The patient's consent was obtained including but not limited to risks of crusting, scabbing, blistering, scarring, darker or lighter pigmentary change, recurrence, incomplete removal and infection. Render Note In Bullet Format When Appropriate: No Number Of Freeze-Thaw Cycles: 1 freeze-thaw cycle Detail Level: Simple Render Post-Care Instructions In Note?: yes Medical Necessity Clause: This procedure was medically necessary because the lesions that were treated were: Medical Necessity Information: It is in your best interest to select a reason for this procedure from the list below. All of these items fulfill various CMS LCD requirements except the new and changing color options.

## 2021-01-13 ENCOUNTER — APPOINTMENT (OUTPATIENT)
Dept: OPHTHALMOLOGY | Facility: CLINIC | Age: 41
End: 2021-01-13

## 2021-01-13 ENCOUNTER — OUTPATIENT (OUTPATIENT)
Dept: OUTPATIENT SERVICES | Facility: HOSPITAL | Age: 41
LOS: 1 days | Discharge: HOME | End: 2021-01-13
Payer: COMMERCIAL

## 2021-01-13 PROCEDURE — 92014 COMPRE OPH EXAM EST PT 1/>: CPT

## 2021-01-27 DIAGNOSIS — H04.123 DRY EYE SYNDROME OF BILATERAL LACRIMAL GLANDS: ICD-10-CM

## 2021-01-27 DIAGNOSIS — H35.363 DRUSEN (DEGENERATIVE) OF MACULA, BILATERAL: ICD-10-CM

## 2021-01-27 DIAGNOSIS — B20 HUMAN IMMUNODEFICIENCY VIRUS [HIV] DISEASE: ICD-10-CM

## 2021-09-11 ENCOUNTER — EMERGENCY (EMERGENCY)
Facility: HOSPITAL | Age: 41
LOS: 0 days | Discharge: HOME | End: 2021-09-11
Attending: EMERGENCY MEDICINE | Admitting: EMERGENCY MEDICINE
Payer: MEDICAID

## 2021-09-11 VITALS
HEART RATE: 79 BPM | TEMPERATURE: 99 F | DIASTOLIC BLOOD PRESSURE: 87 MMHG | SYSTOLIC BLOOD PRESSURE: 154 MMHG | OXYGEN SATURATION: 97 % | HEIGHT: 62 IN | RESPIRATION RATE: 16 BRPM | WEIGHT: 199.96 LBS

## 2021-09-11 DIAGNOSIS — Z87.898 PERSONAL HISTORY OF OTHER SPECIFIED CONDITIONS: ICD-10-CM

## 2021-09-11 DIAGNOSIS — R13.10 DYSPHAGIA, UNSPECIFIED: ICD-10-CM

## 2021-09-11 DIAGNOSIS — B20 HUMAN IMMUNODEFICIENCY VIRUS [HIV] DISEASE: ICD-10-CM

## 2021-09-11 DIAGNOSIS — R05 COUGH: ICD-10-CM

## 2021-09-11 DIAGNOSIS — Z20.822 CONTACT WITH AND (SUSPECTED) EXPOSURE TO COVID-19: ICD-10-CM

## 2021-09-11 DIAGNOSIS — J06.9 ACUTE UPPER RESPIRATORY INFECTION, UNSPECIFIED: ICD-10-CM

## 2021-09-11 DIAGNOSIS — Z79.899 OTHER LONG TERM (CURRENT) DRUG THERAPY: ICD-10-CM

## 2021-09-11 DIAGNOSIS — J02.9 ACUTE PHARYNGITIS, UNSPECIFIED: ICD-10-CM

## 2021-09-11 DIAGNOSIS — Z86.19 PERSONAL HISTORY OF OTHER INFECTIOUS AND PARASITIC DISEASES: ICD-10-CM

## 2021-09-11 LAB
RAPID RVP RESULT: SIGNIFICANT CHANGE UP
SARS-COV-2 RNA SPEC QL NAA+PROBE: SIGNIFICANT CHANGE UP

## 2021-09-11 PROCEDURE — 71046 X-RAY EXAM CHEST 2 VIEWS: CPT | Mod: 26

## 2021-09-11 PROCEDURE — 99284 EMERGENCY DEPT VISIT MOD MDM: CPT

## 2021-09-11 NOTE — ED PROVIDER NOTE - NSICDXFAMILYHX_GEN_ALL_CORE_FT
Progress Note - General Surgery   Olney Haresh 60 y o  female MRN: [id number]  Unit/Bed#: E5 -01 Encounter: [id number]      Subjective/Objective     Subjective: Patient with morbid gastrojejunal stricture s/p Procedure(s):  ESOPHAGOGASTRODUODENOSCOPY (EGD)  SUBTOTAL GASTRECTOMY, ESOPHAGEAL JEJUNOSTOMY  EXTENSIVE LYSIS ADHESIONS POD #1   Tolerating po meds without nausea or vomiting, pain controlled on oral pain medication, ambulating without assistance, voiding well, using incentive spirometer  Objective:    /64   Pulse 86   Temp 100 °F (37 8 °C) (Temporal)   Resp 18   Ht 5' 2" (1 575 m)   Wt 92 5 kg (203 lb 14 8 oz)   SpO2 96%   BMI 37 30 kg/m²       Intake/Output Summary (Last 24 hours) at 12/22/17 0751  Last data filed at 12/22/17 4330   Gross per 24 hour   Intake          3995 84 ml   Output              880 ml   Net          3115 84 ml       Invasive Devices     Peripherally Inserted Central Catheter Line            PICC Line 10/27/17 Right Brachial 55 days          Drain            Closed/Suction Drain Midline Abdomen Bulb 19 Fr  1 day                  Physical Exam    General Appearance:    Alert, cooperative, no distress, appears stated age   Head:    Normocephalic, without obvious abnormality, atraumatic   Lungs:     Clear to auscultation bilaterally, respirations unlabored   Heart:    Regular rate and rhythm, S1 and S2 normal, no murmur, rub    or gallop   Abdomen:     Soft, appropriate tenderness, bowel sounds active all four quadrants, no masses, no organomegaly, non distended, incisions clean, dry, and intact, rebecca drain is serosanguinous  After clot milked from tubing, flow is scant blood tinged serous  Extremities:   Extremities normal, atraumatic, no cyanosis or edema   Neurologic:   CNII-XII intact  Normal strength and sensation               Lab, Imaging and other studies:  I have personally reviewed pertinent lab results    , CBC:   Lab Results   Component Value Date WBC 9 34 12/22/2017    HGB 9 9 (L) 12/22/2017    HCT 29 5 (L) 12/22/2017    MCV 99 (H) 12/22/2017     12/22/2017    MCH 33 1 12/22/2017    MCHC 33 6 12/22/2017    RDW 12 4 12/22/2017    MPV 13 7 (H) 12/22/2017    NRBC 0 12/22/2017   , CMP:   Lab Results   Component Value Date     12/22/2017    K 3 0 (L) 12/22/2017     12/22/2017    CO2 21 12/22/2017    ANIONGAP 11 12/22/2017    BUN 12 12/22/2017    CREATININE 0 69 12/22/2017    GLUCOSE 117 12/22/2017    CALCIUM 8 0 (L) 12/22/2017    AST 35 12/22/2017    ALT 30 12/22/2017    ALKPHOS 88 12/22/2017    PROT 5 5 (L) 12/22/2017    ALBUMIN 2 0 (L) 12/22/2017    BILITOT 0 57 12/22/2017    EGFR 97 12/22/2017        VTE Mechanical Prophylaxis: sequential compression device    Assessment/Plan  #1  Gastrojejunal stricture s/p Procedure(s):  ESOPHAGOGASTRODUODENOSCOPY (EGD)  SUBTOTAL GASTRECTOMY, ESOPHAGEAL JEJUNOSTOMY  EXTENSIVE LYSIS ADHESIONS POD #1   Doing well from surgical standpoint  Encourage ambulation, set up home VNA to cont TPN  #2  Hypokalemia: Cont TPN infusion as ordered  #3  Acute blood loss anemia: likely dilutional component  D/C excess IV fluid and trend hemoglobin  PT is hemodynamically stable  Clinically not fluid overloaded  Monitor I/Os  #4  Hypothyroid: Cont levothyroxine  #5  OAB: Cont ditropan as ordered  #6  Bariatric surgery status: Cont PPI/Carafate/TPN due to severe protein calorie malnutrition and f/u in office  Plan of care was discussed with patient's nurse    Dispo: Continue TPN, ambulation, incentive spirometry  Overall doing well, consider d/c home if home nursing arranged and Hemoglobin is stable  This text is generated with voice recognition software  There may be translation, syntax,  or grammatical errors  If you have any questions, please contact the dictating provider  FAMILY HISTORY:  Mother  Still living? Unknown  Family history of diabetes mellitus, Age at diagnosis: Age Unknown

## 2021-09-11 NOTE — ED PROVIDER NOTE - OBJECTIVE STATEMENT
patient speaks some english, mostly [place], wishes boyfriend to translate. Refused  phone   39 yo F hx of HIV (VL undetectable, T cells 500) c/o sore throat x 1 week.  Pain is mild and worse with swallowing. +cough. No fevers, runny nose, or body aches.

## 2021-09-11 NOTE — ED PROVIDER NOTE - PHYSICAL EXAMINATION
Gen: Alert, NAD, well appearing  Head: NC, AT, PERRL, EOMI, normal lids/conjunctiva  ENT: normal hearing, patent oropharynx without erythema/exudate  Neck: +supple, no tenderness/meningismus,  Pulm: Bilateral BS, normal resp effort, no wheeze/stridor/retractions  CV: RRR  Abd: soft, NT/ND  Mskel: no edema/erythema/cyanosis  Skin: no rash, warm/dry  Neuro: AAOx3, no sensory/motor deficits

## 2021-09-11 NOTE — ED PROVIDER NOTE - PATIENT PORTAL LINK FT
You can access the FollowMyHealth Patient Portal offered by [facility] by registering at the following website: [email or web address]. By joining [employer]’s FollowMyHealth portal, you will also be able to view your health information using other applications (apps) compatible with our system.

## 2021-09-11 NOTE — ED ADULT NURSE NOTE - NS_NURSE_DISC_TEACHING_YN_ED_ALL_ED
3/09 Seizure--etiology, abrupt withdrawal from benzodiazepines    Anorexia/bolemia from age [age]    Anxiety    Back pain, unspecified back location, unspecified back pain laterality, unspecified chronicity    Bipolar disorder NOS    borderline mood disorder    Brain tumor    Cerebrovascular accident (CVA) due to other mechanism    Depression  multiple hospitalizations for attempted suicide---self committed herself to [facility] this past month.  Discharged 10/1/09  Lumbar Herniated Disc  intermittent b/l sciatica with R>L  Posttraumatic Stress Disorder  pt witnessed her father's suicide at the age of 38 with a gun at the age of 2  Brother, age 38, committed suicide on the father's anniversary of the father's suicide on 9/10/09  Smoker    Stroke  1 month ago  Syncope and collapse Yes

## 2021-09-11 NOTE — ED PROVIDER NOTE - NPI NUMBER (FOR SYSADMIN USE ONLY) :
26 yo  at 32w2d  Came in with pelvic pain and pressure today  No vaginal bleeding or rupture of membranes  No discharge  Good fetal movements     [UNKNOWN]

## 2021-09-11 NOTE — ED ADULT NURSE NOTE - CCCP TRG CHIEF CMPLNT
Subjective


Progress Note for:: 05/14/18


Subjective:: 





Feeling considerably better.  Last fever 100.6 at 4:00 this morning.  Diarrhea 

has slowed.  He is requesting more solid food


Reason For Visit: 


ABD PAIN ILEUS,GASTROENTRITIS,HTN URGENCY








Physical Exam


Vital Signs: 


 











Temp Pulse Resp BP Pulse Ox


 


 98.1 F   75   15   136/84 H  97 


 


 05/14/18 07:43  05/14/18 07:43  05/14/18 07:43  05/14/18 07:43  05/14/18 07:43








 Intake & Output











 05/13/18 05/14/18 05/15/18





 05:59 05:59 05:59


 


Intake Total  975 1582


 


Output Total   625


 


Balance  975 957


 


Weight  244 lb 0.827 oz 











General appearance: PRESENT: no acute distress, obese


Respiratory exam: PRESENT: clear to auscultation renetta


Cardiovascular exam: PRESENT: RRR


GI/Abdominal exam: PRESENT: soft.  ABSENT: tenderness


Neurological exam: PRESENT: alert


Psychiatric exam: PRESENT: appropriate affect


Skin exam: PRESENT: warm





Results


Laboratory Results: 


 





 05/13/18 03:13 





 05/13/18 18:30 





 











  05/13/18





  18:30


 


Sodium  140.0


 


Potassium  3.7


 


Chloride  98


 


Carbon Dioxide  32 H


 


Anion Gap  10


 


BUN  12


 


Creatinine  1.06


 


Est GFR ( Amer)  > 60


 


Est GFR (Non-Af Amer)  > 60


 


Glucose  107


 


Calcium  8.2 L











Impressions: 


 





Abdomen X-Ray  05/13/18 00:07


IMPRESSION:


 


Small bowel ileus or low-grade obstruction.


 














Assessment & Plan





- Diagnosis


(1) Enterocolitis


Is this a current diagnosis for this admission?: Yes   


Plan: 


C. difficile was negative.  Stop Flagyl.  Continue Cipro.  Advance diet








(2) HTN (hypertension)


Qualifiers: 


   Hypertension type: essential hypertension   Qualified Code(s): I10 - 

Essential (primary) hypertension   


Is this a current diagnosis for this admission?: Yes   


Plan: 


Not on any home medication.  Quite severe.  Continue to titrate 

antihypertensives for optimal control.








(3) Obesity


Qualifiers: 


   Obesity type: due to excess calories   Body mass index: BMI 34.0-34.9 


Is this a current diagnosis for this admission?: Yes   





(4) Sepsis


Qualifiers: 


   Sepsis type: sepsis due to unspecified organism   Qualified Code(s): A41.9 - 

Sepsis, unspecified organism   


Is this a current diagnosis for this admission?: Yes   


Plan: 


Presumably the same organism as his colitis.  Cultures are negative thus far.  

Appears to be improving clinically








(5) Microcytic anemia


Is this a current diagnosis for this admission?: Yes   


Plan: 


Unusual in a man this age.  Guaiac stool.  Check an iron. sore throat

## 2021-09-11 NOTE — ED PROVIDER NOTE - NSFOLLOWUPINSTRUCTIONS_ED_ALL_ED_FT
Upper Respiratory Infection    A  respiratory infection is an illness that affects parts of the body used for breathing, like the lungs, nose, and throat. It is caused by a germ called a virus. Symptoms can include runny nose, coughing, sneezing, fatigue, body aches, sore throat, fever, or headache. Over the counter medicine can be used to manage the symptoms but the infection itself goes away on its own.     SEEK IMMEDIATE MEDICAL CARE IF YOU HAVE THE FOLLOWING SYMPTOMS: shortness of breath, chest pain, fever over 10 days, lightheadedness/dizziness.

## 2021-09-11 NOTE — ED PROVIDER NOTE - NS ED ROS FT
Review of Systems    Constitutional: (-) fever, (-) chills  Eyes/ENT: (-) blurry vision, (-) epistaxis, (+) sore throat  Cardiovascular: (-) chest pain, (-) syncope  Respiratory: (+) cough, (-) shortness of breath  Gastrointestinal: (-) pain, (-) nausea, (-) vomiting, (-) diarrhea  Musculoskeletal: (-) neck pain, (-) back pain, (-) body aches  Integumentary: (-) rash, (-) edema  Neurological: (-) headache, (-) altered mental status

## 2021-09-11 NOTE — ED PROVIDER NOTE - ATTENDING CONTRIBUTION TO CARE
39 yo f with pmh of HIV, CD4 500, presents with sore throat x 1 week.  mild cough.  no fever, no body aches, no cp, no sob, no abd pain, no n/v/d.  exam: nad, ncat, perrl, eomi, mmm, rrr, ctab, abd soft, nt,nd aox3, OP open, no exudates or vesicles, no erythema imp: pt with sore throat, mild cough, will swab and cxr

## 2021-12-25 ENCOUNTER — EMERGENCY (EMERGENCY)
Facility: HOSPITAL | Age: 41
LOS: 0 days | Discharge: HOME | End: 2021-12-25
Attending: EMERGENCY MEDICINE | Admitting: EMERGENCY MEDICINE
Payer: MEDICAID

## 2021-12-25 VITALS
SYSTOLIC BLOOD PRESSURE: 136 MMHG | RESPIRATION RATE: 17 BRPM | HEIGHT: 62 IN | DIASTOLIC BLOOD PRESSURE: 81 MMHG | TEMPERATURE: 99 F | OXYGEN SATURATION: 99 % | HEART RATE: 78 BPM

## 2021-12-25 DIAGNOSIS — U07.1 COVID-19: ICD-10-CM

## 2021-12-25 DIAGNOSIS — E11.9 TYPE 2 DIABETES MELLITUS WITHOUT COMPLICATIONS: ICD-10-CM

## 2021-12-25 DIAGNOSIS — R05.1 ACUTE COUGH: ICD-10-CM

## 2021-12-25 DIAGNOSIS — Z21 ASYMPTOMATIC HUMAN IMMUNODEFICIENCY VIRUS [HIV] INFECTION STATUS: ICD-10-CM

## 2021-12-25 DIAGNOSIS — R09.81 NASAL CONGESTION: ICD-10-CM

## 2021-12-25 DIAGNOSIS — M79.10 MYALGIA, UNSPECIFIED SITE: ICD-10-CM

## 2021-12-25 LAB — SARS-COV-2 RNA SPEC QL NAA+PROBE: DETECTED

## 2021-12-25 PROCEDURE — 99284 EMERGENCY DEPT VISIT MOD MDM: CPT

## 2021-12-25 NOTE — ED PROVIDER NOTE - ATTENDING CONTRIBUTION TO CARE
I personally evaluated the patient. I reviewed the Resident’s or Physician Assistant’s note (as assigned above), and agree with the findings and plan except as documented in my note.  42 Y/O F HIV+ (VL UNDETECTABLE 7/2021), DM (DIET CONTROLLED) C/O 2 DAYS OF SORE THROAT, COUGH, AND CONGESTION. + VACCINATED-2 SHOTS. NO N/V/D. NO SOB. NO CP. NO ABD PAIN. NO HA. PT WITH NORMAL APPETITE AND PO INTAKE. VITALS NOTED. ALERT OX3 NAD WELL APPEARING. NCAT PERRL. EOMI. OP NORMAL. MMM. NECK SUPPLE. LUNGS CLEAR B/L. RRR. S1S2. ABD- SOFT NONTENDER. NEURO EXAM NONFOCAL.

## 2021-12-25 NOTE — ED PROVIDER NOTE - OBJECTIVE STATEMENT
40 y/o F, PMHx HIV - on HAART ; viral load undetectable, presents to the ED with complaints of myalgias and cough x two days. She denies fever, chills, chest pain, dyspnea, nausea, vomiting, and abdominal pain. She states that she may have been exposed to COVID at her job. She is vaccinated and does not smoke.

## 2021-12-25 NOTE — ED PROVIDER NOTE - PATIENT PORTAL LINK FT
You can access the FollowMyHealth Patient Portal offered by [facility] by registering at the following website: [email or web address]. By joining [employer]’s FollowMyHealth portal, you will also be able to view your health information using other applications (apps) compatible with our system.

## 2021-12-25 NOTE — ED PROVIDER NOTE - NSFOLLOWUPCLINICS_GEN_ALL_ED_FT
[facility]  [address] [place] [address]  Phone: [phone]  Fax:   Follow Up Time: Routine    [facility]  Medicine  [address], [place]   Phone: [phone]  Fax:   Follow Up Time: Routine

## 2021-12-25 NOTE — ED PROVIDER NOTE - CLINICAL SUMMARY MEDICAL DECISION MAKING FREE TEXT BOX
42 Y/O F HIV+, DM WITH 2 DAYS OF URIS SXS. VITALS NORMAL. EXAM NORMAL. COVID TEST SENT. PT GIVEN RETURN INSTRUCTIONS.

## 2021-12-25 NOTE — ED PROVIDER NOTE - CARE PLAN
Principal Discharge DX:	Nasal congestion  Secondary Diagnosis:	Encounter for laboratory testing for COVID-19 virus   1

## 2022-01-18 ENCOUNTER — APPOINTMENT (OUTPATIENT)
Dept: OPHTHALMOLOGY | Facility: CLINIC | Age: 42
End: 2022-01-18

## 2022-02-03 ENCOUNTER — EMERGENCY (EMERGENCY)
Facility: HOSPITAL | Age: 42
LOS: 0 days | Discharge: HOME | End: 2022-02-03
Attending: EMERGENCY MEDICINE | Admitting: EMERGENCY MEDICINE
Payer: MEDICAID

## 2022-02-03 VITALS
TEMPERATURE: 97 F | WEIGHT: 225.09 LBS | HEART RATE: 97 BPM | OXYGEN SATURATION: 97 % | HEIGHT: 62 IN | DIASTOLIC BLOOD PRESSURE: 92 MMHG | RESPIRATION RATE: 18 BRPM | SYSTOLIC BLOOD PRESSURE: 134 MMHG

## 2022-02-03 DIAGNOSIS — Z79.899 OTHER LONG TERM (CURRENT) DRUG THERAPY: ICD-10-CM

## 2022-02-03 DIAGNOSIS — B20 HUMAN IMMUNODEFICIENCY VIRUS [HIV] DISEASE: ICD-10-CM

## 2022-02-03 DIAGNOSIS — R19.7 DIARRHEA, UNSPECIFIED: ICD-10-CM

## 2022-02-03 DIAGNOSIS — R11.2 NAUSEA WITH VOMITING, UNSPECIFIED: ICD-10-CM

## 2022-02-03 DIAGNOSIS — R10.30 LOWER ABDOMINAL PAIN, UNSPECIFIED: ICD-10-CM

## 2022-02-03 LAB
ALBUMIN SERPL ELPH-MCNC: 4.2 G/DL — SIGNIFICANT CHANGE UP (ref 3.5–5.2)
ALP SERPL-CCNC: 131 U/L — HIGH (ref 30–115)
ALT FLD-CCNC: 85 U/L — HIGH (ref 0–41)
ANION GAP SERPL CALC-SCNC: 12 MMOL/L — SIGNIFICANT CHANGE UP (ref 7–14)
AST SERPL-CCNC: 60 U/L — HIGH (ref 0–41)
BASOPHILS # BLD AUTO: 0.02 K/UL — SIGNIFICANT CHANGE UP (ref 0–0.2)
BASOPHILS NFR BLD AUTO: 0.2 % — SIGNIFICANT CHANGE UP (ref 0–1)
BILIRUB SERPL-MCNC: 0.7 MG/DL — SIGNIFICANT CHANGE UP (ref 0.2–1.2)
BUN SERPL-MCNC: 14 MG/DL — SIGNIFICANT CHANGE UP (ref 10–20)
CALCIUM SERPL-MCNC: 9.7 MG/DL — SIGNIFICANT CHANGE UP (ref 8.5–10.1)
CHLORIDE SERPL-SCNC: 100 MMOL/L — SIGNIFICANT CHANGE UP (ref 98–110)
CO2 SERPL-SCNC: 22 MMOL/L — SIGNIFICANT CHANGE UP (ref 17–32)
CREAT SERPL-MCNC: 0.8 MG/DL — SIGNIFICANT CHANGE UP (ref 0.7–1.5)
EOSINOPHIL # BLD AUTO: 0.03 K/UL — SIGNIFICANT CHANGE UP (ref 0–0.7)
EOSINOPHIL NFR BLD AUTO: 0.3 % — SIGNIFICANT CHANGE UP (ref 0–8)
GLUCOSE SERPL-MCNC: 246 MG/DL — HIGH (ref 70–99)
HCG SERPL QL: NEGATIVE — SIGNIFICANT CHANGE UP
HCT VFR BLD CALC: 47.9 % — HIGH (ref 37–47)
HGB BLD-MCNC: 16.4 G/DL — HIGH (ref 12–16)
IMM GRANULOCYTES NFR BLD AUTO: 0.4 % — HIGH (ref 0.1–0.3)
LACTATE SERPL-SCNC: 1.4 MMOL/L — SIGNIFICANT CHANGE UP (ref 0.7–2)
LIDOCAIN IGE QN: 54 U/L — SIGNIFICANT CHANGE UP (ref 7–60)
LYMPHOCYTES # BLD AUTO: 1.39 K/UL — SIGNIFICANT CHANGE UP (ref 1.2–3.4)
LYMPHOCYTES # BLD AUTO: 11.9 % — LOW (ref 20.5–51.1)
MCHC RBC-ENTMCNC: 32.2 PG — HIGH (ref 27–31)
MCHC RBC-ENTMCNC: 34.2 G/DL — SIGNIFICANT CHANGE UP (ref 32–37)
MCV RBC AUTO: 94.1 FL — SIGNIFICANT CHANGE UP (ref 81–99)
MONOCYTES # BLD AUTO: 0.47 K/UL — SIGNIFICANT CHANGE UP (ref 0.1–0.6)
MONOCYTES NFR BLD AUTO: 4 % — SIGNIFICANT CHANGE UP (ref 1.7–9.3)
NEUTROPHILS # BLD AUTO: 9.68 K/UL — HIGH (ref 1.4–6.5)
NEUTROPHILS NFR BLD AUTO: 83.2 % — HIGH (ref 42.2–75.2)
NRBC # BLD: 0 /100 WBCS — SIGNIFICANT CHANGE UP (ref 0–0)
PLATELET # BLD AUTO: 306 K/UL — SIGNIFICANT CHANGE UP (ref 130–400)
POTASSIUM SERPL-MCNC: 4.2 MMOL/L — SIGNIFICANT CHANGE UP (ref 3.5–5)
POTASSIUM SERPL-SCNC: 4.2 MMOL/L — SIGNIFICANT CHANGE UP (ref 3.5–5)
PROT SERPL-MCNC: 7.5 G/DL — SIGNIFICANT CHANGE UP (ref 6–8)
RBC # BLD: 5.09 M/UL — SIGNIFICANT CHANGE UP (ref 4.2–5.4)
RBC # FLD: 12.1 % — SIGNIFICANT CHANGE UP (ref 11.5–14.5)
SODIUM SERPL-SCNC: 134 MMOL/L — LOW (ref 135–146)
WBC # BLD: 11.64 K/UL — HIGH (ref 4.8–10.8)
WBC # FLD AUTO: 11.64 K/UL — HIGH (ref 4.8–10.8)

## 2022-02-03 PROCEDURE — 99284 EMERGENCY DEPT VISIT MOD MDM: CPT

## 2022-02-03 RX ORDER — ONDANSETRON 8 MG/1
1 TABLET, FILM COATED ORAL
Qty: 8 | Refills: 0
Start: 2022-02-03 | End: 2022-02-04

## 2022-02-03 RX ORDER — SODIUM CHLORIDE 9 MG/ML
2000 INJECTION, SOLUTION INTRAVENOUS ONCE
Refills: 0 | Status: COMPLETED | OUTPATIENT
Start: 2022-02-03 | End: 2022-02-03

## 2022-02-03 RX ORDER — ONDANSETRON 8 MG/1
4 TABLET, FILM COATED ORAL ONCE
Refills: 0 | Status: COMPLETED | OUTPATIENT
Start: 2022-02-03 | End: 2022-02-03

## 2022-02-03 RX ADMIN — ONDANSETRON 4 MILLIGRAM(S): 8 TABLET, FILM COATED ORAL at 17:55

## 2022-02-03 RX ADMIN — SODIUM CHLORIDE 1000 MILLILITER(S): 9 INJECTION, SOLUTION INTRAVENOUS at 17:55

## 2022-02-03 NOTE — ED PROVIDER NOTE - PATIENT PORTAL LINK FT
You can access the FollowMyHealth Patient Portal offered by [facility] by registering at the following website: [email or web address]. By joining [employer]’s FollowMyHealth portal, you will also be able to view your health information using other applications (apps) compatible with our system.

## 2022-02-03 NOTE — ED PROVIDER NOTE - NS ED ROS FT
CONST: No fever, chills or bodyaches  EYES: No pain, redness, drainage or visual changes.  ENT: No ear pain or discharge, nasal discharge or congestion. No sore throat  CARD: No chest pain, palpitations  RESP: No SOB, cough, hemoptysis.   GI: No abdominal pain, (+) N/V/D  MS: No joint pain, back pain or extremity pain/injury  SKIN: No rashes  NEURO: No headache, dizziness, paresthesias or LOC  : no dysuria

## 2022-02-03 NOTE — ED PROVIDER NOTE - OBJECTIVE STATEMENT
Pt with hx of HIV(+) on HAART with UDVL and Tcell count over 500 presents with NVD starting this morning. Vomited 2-3 times and diarrhea 7-8 times assoc with lower abd cramping. Denies blood in vomit or stool. Denies fever, chills. Denies sick contacts or eating spoiled food. Denies abdominal surgeries

## 2022-02-03 NOTE — ED PROVIDER NOTE - PHYSICAL EXAMINATION
CONST: Well appearing in NAD  EYES: PERRL, EOMI, Sclera and conjunctiva clear.   ENT: Oropharynx normal appearing, no erythema or exudates. Uvula midline.  NECK: Non-tender, no meningeal signs  CARD: Normal S1 S2; Normal rate and rhythm  RESP: Equal BS B/L, No wheezes, rhonchi or rales. No distress  GI: Soft, non-tender, non-distended. No rebound or RLQT. No CVAT. Neg [place]. Neg RUQT  MS: Normal ROM in all extremities. No midline spinal tenderness.  SKIN: Warm, dry, no acute rashes. Good turgor  NEURO: A&Ox3, No focal deficits. Strength 5/5 with no sensory deficits. Steady gait

## 2022-02-03 NOTE — ED PROVIDER NOTE - ATTENDING CONTRIBUTION TO CARE
42 yo f with pmh of hiv on haart, cd4>500 presents with c/o abd cramping with n/v/d since last night.  pt denies fever but has chills.  no cp, no sob.  cramping has improved since this morning.  exam: nad, ncat, perrl, eomi, dry mm, rrr, ctab, abd soft, nt, nd aox3, no calf tenderness, no pitting edema imp: pt with n/v/d, benign abd exam, will check labs, symptomatic tx, reassess

## 2022-02-03 NOTE — ED PROVIDER NOTE - CLINICAL SUMMARY MEDICAL DECISION MAKING FREE TEXT BOX
Pt with n/v/d, likely viral enteritis, improved with symptomatic tx.  Pt was given strict return to ED precautions and pt indicated that he/she understood. Pt with n/v/d, likely viral enteritis, improved with symptomatic tx.  Pt was given strict return to ED precautions and pt indicated that he/she understood

## 2022-03-29 ENCOUNTER — APPOINTMENT (OUTPATIENT)
Dept: OBGYN | Facility: CLINIC | Age: 42
End: 2022-03-29
Payer: MEDICAID

## 2022-03-29 ENCOUNTER — OUTPATIENT (OUTPATIENT)
Dept: OUTPATIENT SERVICES | Facility: HOSPITAL | Age: 42
LOS: 1 days | Discharge: HOME | End: 2022-03-29

## 2022-03-29 VITALS
WEIGHT: 234 LBS | DIASTOLIC BLOOD PRESSURE: 60 MMHG | SYSTOLIC BLOOD PRESSURE: 120 MMHG | BODY MASS INDEX: 36.73 KG/M2 | HEIGHT: 67 IN

## 2022-03-29 DIAGNOSIS — Z87.898 PERSONAL HISTORY OF OTHER SPECIFIED CONDITIONS: ICD-10-CM

## 2022-03-29 DIAGNOSIS — N91.5 OLIGOMENORRHEA, UNSPECIFIED: ICD-10-CM

## 2022-03-29 DIAGNOSIS — Z78.9 OTHER SPECIFIED HEALTH STATUS: ICD-10-CM

## 2022-03-29 DIAGNOSIS — N90.89 OTHER SPECIFIED NONINFLAMMATORY DISORDERS OF VULVA AND PERINEUM: ICD-10-CM

## 2022-03-29 DIAGNOSIS — Z87.42 PERSONAL HISTORY OF OTHER DISEASES OF THE FEMALE GENITAL TRACT: ICD-10-CM

## 2022-03-29 DIAGNOSIS — Z83.3 FAMILY HISTORY OF DIABETES MELLITUS: ICD-10-CM

## 2022-03-29 DIAGNOSIS — Z80.3 FAMILY HISTORY OF MALIGNANT NEOPLASM OF BREAST: ICD-10-CM

## 2022-03-29 PROCEDURE — 99396 PREV VISIT EST AGE 40-64: CPT

## 2022-03-30 DIAGNOSIS — N87.0 MILD CERVICAL DYSPLASIA: ICD-10-CM

## 2022-03-30 DIAGNOSIS — Z11.3 ENCOUNTER FOR SCREENING FOR INFECTIONS WITH A PREDOMINANTLY SEXUAL MODE OF TRANSMISSION: ICD-10-CM

## 2022-03-30 DIAGNOSIS — E66.9 OBESITY, UNSPECIFIED: ICD-10-CM

## 2022-03-30 DIAGNOSIS — Z01.419 ENCOUNTER FOR GYNECOLOGICAL EXAMINATION (GENERAL) (ROUTINE) WITHOUT ABNORMAL FINDINGS: ICD-10-CM

## 2022-03-30 DIAGNOSIS — E66.01 MORBID (SEVERE) OBESITY DUE TO EXCESS CALORIES: ICD-10-CM

## 2022-03-30 DIAGNOSIS — E11.9 TYPE 2 DIABETES MELLITUS WITHOUT COMPLICATIONS: ICD-10-CM

## 2022-03-30 PROBLEM — Z87.42 HISTORY OF POLYCYSTIC OVARIAN SYNDROME: Status: RESOLVED | Noted: 2022-03-30 | Resolved: 2022-03-30

## 2022-03-30 PROBLEM — Z83.3 FAMILY HISTORY OF DIABETES MELLITUS: Status: ACTIVE | Noted: 2022-03-30

## 2022-03-30 PROBLEM — Z80.3 FAMILY HISTORY OF MALIGNANT NEOPLASM OF BREAST: Status: ACTIVE | Noted: 2022-03-30

## 2022-03-30 PROBLEM — N91.5 OLIGOMENORRHEA: Status: RESOLVED | Noted: 2022-03-30 | Resolved: 2022-03-30

## 2022-03-30 PROBLEM — Z78.9 DOES NOT USE TOBACCO: Status: ACTIVE | Noted: 2022-03-30

## 2022-03-31 LAB — HPV HIGH+LOW RISK DNA PNL CVX: NOT DETECTED

## 2022-04-01 ENCOUNTER — NON-APPOINTMENT (OUTPATIENT)
Age: 42
End: 2022-04-01

## 2022-04-01 DIAGNOSIS — B96.89 ACUTE VAGINITIS: ICD-10-CM

## 2022-04-01 DIAGNOSIS — N76.0 ACUTE VAGINITIS: ICD-10-CM

## 2022-04-01 LAB
A VAGINAE DNA VAG QL NAA+PROBE: ABNORMAL
BVAB2 DNA VAG QL NAA+PROBE: ABNORMAL
C KRUSEI DNA VAG QL NAA+PROBE: NEGATIVE
C TRACH RRNA SPEC QL NAA+PROBE: NEGATIVE
MEGA1 DNA VAG QL NAA+PROBE: ABNORMAL
N GONORRHOEA RRNA SPEC QL NAA+PROBE: NEGATIVE
T VAGINALIS RRNA SPEC QL NAA+PROBE: NEGATIVE

## 2022-04-11 ENCOUNTER — OUTPATIENT (OUTPATIENT)
Dept: OUTPATIENT SERVICES | Facility: HOSPITAL | Age: 42
LOS: 1 days | Discharge: HOME | End: 2022-04-11

## 2022-04-11 ENCOUNTER — APPOINTMENT (OUTPATIENT)
Dept: INTERNAL MEDICINE | Facility: CLINIC | Age: 42
End: 2022-04-11
Payer: MEDICAID

## 2022-04-11 VITALS
BODY MASS INDEX: 36.41 KG/M2 | SYSTOLIC BLOOD PRESSURE: 112 MMHG | WEIGHT: 232 LBS | DIASTOLIC BLOOD PRESSURE: 73 MMHG | OXYGEN SATURATION: 95 % | HEART RATE: 70 BPM | TEMPERATURE: 97.9 F | HEIGHT: 67 IN

## 2022-04-11 DIAGNOSIS — E66.9 OBESITY, UNSPECIFIED: ICD-10-CM

## 2022-04-11 DIAGNOSIS — Z86.39 PERSONAL HISTORY OF OTHER ENDOCRINE, NUTRITIONAL AND METABOLIC DISEASE: ICD-10-CM

## 2022-04-11 DIAGNOSIS — E11.9 TYPE 2 DIABETES MELLITUS WITHOUT COMPLICATIONS: ICD-10-CM

## 2022-04-11 PROCEDURE — 99204 OFFICE O/P NEW MOD 45 MIN: CPT | Mod: GC

## 2022-04-11 NOTE — HISTORY OF PRESENT ILLNESS
[FreeTextEntry1] : establish care  [de-identified] : Ms Rayshawn Aden is a 41 WHITNEY w a PMH of HIV (on Biktarvy, viral load undetectable), HSV, PCOS, Obesity and DM II (last A1c 8.4) presents to establish care and to gain better glycemic control. Patient reports she has tried Metformin in the past but had severe headaches and body aches. She is followed by Dr BREONNA Astudillo for her HIV and has an appointment later this month. She reports worsening vision and has a visit with opthamology next month

## 2022-04-11 NOTE — PHYSICAL EXAM
[No Acute Distress] : no acute distress [Well-Appearing] : well-appearing [Normal Sclera/Conjunctiva] : normal sclera/conjunctiva [PERRL] : pupils equal round and reactive to light [Normal Outer Ear/Nose] : the outer ears and nose were normal in appearance [No JVD] : no jugular venous distention [No Lymphadenopathy] : no lymphadenopathy [No Respiratory Distress] : no respiratory distress  [No Accessory Muscle Use] : no accessory muscle use [Clear to Auscultation] : lungs were clear to auscultation bilaterally [Normal Rate] : normal rate  [Regular Rhythm] : with a regular rhythm [Normal S1, S2] : normal S1 and S2 [Pedal Pulses Present] : the pedal pulses are present [No Edema] : there was no peripheral edema [No Extremity Clubbing/Cyanosis] : no extremity clubbing/cyanosis [Soft] : abdomen soft [Non Tender] : non-tender [No HSM] : no HSM [Normal Bowel Sounds] : normal bowel sounds [Normal Supraclavicular Nodes] : no supraclavicular lymphadenopathy [Normal Posterior Cervical Nodes] : no posterior cervical lymphadenopathy [Normal Anterior Cervical Nodes] : no anterior cervical lymphadenopathy [No CVA Tenderness] : no CVA  tenderness [No Spinal Tenderness] : no spinal tenderness [No Joint Swelling] : no joint swelling [Grossly Normal Strength/Tone] : grossly normal strength/tone [No Rash] : no rash [Coordination Grossly Intact] : coordination grossly intact [de-identified] : obese

## 2022-04-11 NOTE — REVIEW OF SYSTEMS
[Vision Problems] : vision problems [Fever] : no fever [Chills] : no chills [Fatigue] : no fatigue [Night Sweats] : no night sweats [Recent Change In Weight] : ~T no recent weight change [Pain] : no pain [Redness] : no redness [Itching] : no itching [Earache] : no earache [Hearing Loss] : no hearing loss [Nasal Discharge] : no nasal discharge [Sore Throat] : no sore throat [Chest Pain] : no chest pain [Palpitations] : no palpitations [Leg Claudication] : no leg claudication [Orthopnea] : no orthopnea [Paroxysmal Nocturnal Dyspnea] : no paroxysmal nocturnal dyspnea [Shortness Of Breath] : no shortness of breath [Wheezing] : no wheezing [Cough] : no cough [Nausea] : no nausea [Constipation] : no constipation [Vomiting] : no vomiting [Heartburn] : no heartburn [Melena] : no melena [Dysuria] : no dysuria [Incontinence] : no incontinence [Nocturia] : no nocturia [Hematuria] : no hematuria [Frequency] : no frequency [Vaginal Discharge] : no vaginal discharge [Joint Pain] : no joint pain [Joint Stiffness] : no joint stiffness [Joint Swelling] : no joint swelling [Muscle Pain] : no muscle pain [Back Pain] : no back pain [Nail Changes] : no nail changes [Skin Rash] : no skin rash [Headache] : no headache [Dizziness] : no dizziness [Memory Loss] : no memory loss [Insomnia] : no insomnia [Easy Bleeding] : no easy bleeding [Swollen Glands] : no swollen glands [FreeTextEntry3] : as per HPI  [FreeTextEntry8] : I

## 2022-04-11 NOTE — ASSESSMENT
[FreeTextEntry1] : Ms Rayshawn Aden is a 41 WHIT[place] w a PMH of HIV, obesity and DM presenting to establish care and for better glycemic control. Given patient was unable to tolerate metformin will prescribe jenuvia until patient is able to see endocrinologist.\par \par #DM\par A1c =84 in March 2022\par unable to tolerate metformin\par patient already has podiatry and opthalmology appointments\par -f/u Endocrinology recommendations\par -start Jenuvia 50 qd until further recommendations\par -f/u in 6 months for A1c and glucose \par \par #HIV\par patient on Biktarvy w undetectable viral load\par Pateint follows w Dr Astudillo\par deffereing HIV care to Dr Astudillo. \par \par #Wellness\par patient is up to date on mamogram, pap, immunizations\par -will recommend flu shot at next visit. \par rto in 6 months and prn

## 2022-04-11 NOTE — HEALTH RISK ASSESSMENT
[Good] : ~his/her~ current health as good [Very Good] : ~his/her~  mood as very good [Never] : Never [No] : No

## 2022-04-15 DIAGNOSIS — Z86.19 PERSONAL HISTORY OF OTHER INFECTIOUS AND PARASITIC DISEASES: ICD-10-CM

## 2022-04-15 LAB
24R-OH-CALCIDIOL SERPL-MCNC: 43.3 PG/ML
ANTI-MUELLERIAN HORMONE: 0.03 NG/ML
CHOLEST SERPL-MCNC: 166 MG/DL
CYTOLOGY CVX/VAG DOC THIN PREP: ABNORMAL
ESTIMATED AVERAGE GLUCOSE: 194 MG/DL
ESTRADIOL SERPL-MCNC: 115 PG/ML
FSH SERPL-MCNC: 7.8 IU/L
HBA1C MFR BLD HPLC: 8.4 %
HDLC SERPL-MCNC: 36 MG/DL
LDLC SERPL CALC-MCNC: 105 MG/DL
NONHDLC SERPL-MCNC: 130 MG/DL
PROGEST SERPL-MCNC: 0.1 NG/ML
T PALLIDUM AB SER QL IA: NEGATIVE
TRIGL SERPL-MCNC: 184 MG/DL
TSH SERPL-ACNC: 1.6 UIU/ML

## 2022-04-26 ENCOUNTER — APPOINTMENT (OUTPATIENT)
Dept: INFECTIOUS DISEASE | Facility: CLINIC | Age: 42
End: 2022-04-26
Payer: MEDICAID

## 2022-04-26 ENCOUNTER — OUTPATIENT (OUTPATIENT)
Dept: OUTPATIENT SERVICES | Facility: HOSPITAL | Age: 42
LOS: 1 days | Discharge: HOME | End: 2022-04-26

## 2022-04-26 VITALS
HEIGHT: 67 IN | HEART RATE: 68 BPM | TEMPERATURE: 97.8 F | OXYGEN SATURATION: 97 % | WEIGHT: 234 LBS | BODY MASS INDEX: 36.73 KG/M2 | SYSTOLIC BLOOD PRESSURE: 108 MMHG | RESPIRATION RATE: 14 BRPM | DIASTOLIC BLOOD PRESSURE: 71 MMHG

## 2022-04-26 DIAGNOSIS — B20 HUMAN IMMUNODEFICIENCY VIRUS [HIV] DISEASE: ICD-10-CM

## 2022-04-26 DIAGNOSIS — B00.9 HERPESVIRAL INFECTION, UNSPECIFIED: ICD-10-CM

## 2022-04-26 DIAGNOSIS — E11.9 TYPE 2 DIABETES MELLITUS WITHOUT COMPLICATIONS: ICD-10-CM

## 2022-04-26 DIAGNOSIS — E66.9 OBESITY, UNSPECIFIED: ICD-10-CM

## 2022-04-26 PROCEDURE — 99204 OFFICE O/P NEW MOD 45 MIN: CPT

## 2022-04-26 RX ORDER — METRONIDAZOLE 7.5 MG/G
0.75 GEL VAGINAL
Qty: 1 | Refills: 2 | Status: COMPLETED | COMMUNITY
Start: 2022-04-01 | End: 2022-04-26

## 2022-04-26 RX ORDER — METRONIDAZOLE 500 MG/1
500 TABLET ORAL TWICE DAILY
Qty: 14 | Refills: 1 | Status: COMPLETED | COMMUNITY
Start: 2020-11-04 | End: 2022-04-26

## 2022-04-26 RX ORDER — METRONIDAZOLE 7.5 MG/G
0.75 GEL VAGINAL
Qty: 1 | Refills: 1 | Status: COMPLETED | COMMUNITY
Start: 2022-04-15 | End: 2022-04-26

## 2022-04-26 NOTE — ASSESSMENT
[Treatment Education] : treatment education [Treatment Adherence] : treatment adherence [Risk Reduction] : risk reduction [FreeTextEntry1] : Encounter conducted in [place]\par \par #AIDS, now well controlled\par - dx 2/2019 with PCP PNA and vaginal HSV outbreak- likely via sexual contact\par - at time of dx CD4 70; 5% HIV-1 RNA Quantitative, Viral Load: 701161 (02.12.19 @ 00:45)\par Toxo IgG neg, CMV IgG +, RPR neg\par - outpatient labs CD4 537;  2/2022\par - continue biktarvy\par - check labs \par \par #Hx HSV Herpes\par - Continue Valtrex 1g daily for prophylaxis\par \par #Hx indeterminate quantiferon/PPD neg -\par - recheck \par \par #DM\par - Cont januvia- started 3 weeks ago , didn’t tolerate metformin- needs another drug  \par - Follows with Endo\par - 4/5/22 A1c 8.4 from 5.7\par \par #HCM \par - need vaccine/ pneumonia records\par - COVID 2 x pfizer recommended booster \par - Flu UTD\par - 12/2021 COVID+\par - 4/6/22 pap neg, +BV, treated\par \par I have personally reviewed all the available charts, laboratory data, radiology and consultation notes\par 45 min spent counseling patient

## 2022-04-26 NOTE — PHYSICAL EXAM
[General Appearance - Alert] : alert [General Appearance - In No Acute Distress] : in no acute distress [Sclera] : the sclera and conjunctiva were normal [PERRL With Normal Accommodation] : pupils were equal in size, round, reactive to light [Extraocular Movements] : extraocular movements were intact [Outer Ear] : the ears and nose were normal in appearance [Oropharynx] : the oropharynx was normal with no thrush [Neck Appearance] : the appearance of the neck was normal [Neck Cervical Mass (___cm)] : no neck mass was observed [Jugular Venous Distention Increased] : there was no jugular-venous distention [Thyroid Diffuse Enlargement] : the thyroid was not enlarged [Auscultation Breath Sounds / Voice Sounds] : lungs were clear to auscultation bilaterally [Heart Rate And Rhythm] : heart rate was normal and rhythm regular [Heart Sounds] : normal S1 and S2 [Heart Sounds Gallop] : no gallops [Murmurs] : no murmurs [Heart Sounds Pericardial Friction Rub] : no pericardial rub [Full Pulse] : the pedal pulses are present [Edema] : there was no peripheral edema [Bowel Sounds] : normal bowel sounds [Abdomen Soft] : soft [Abdomen Tenderness] : non-tender [Abdomen Mass (___ Cm)] : no abdominal mass palpated [Costovertebral Angle Tenderness] : no CVA tenderness [No Palpable Adenopathy] : no palpable adenopathy [Musculoskeletal - Swelling] : no joint swelling [Nail Clubbing] : no clubbing  or cyanosis of the fingernails [Motor Tone] : muscle strength and tone were normal [] : no rash [Skin Color & Pigmentation] : normal skin color and pigmentation [Deep Tendon Reflexes (DTR)] : deep tendon reflexes were 2+ and symmetric [Sensation] : the sensory exam was normal to light touch and pinprick [No Focal Deficits] : no focal deficits [Oriented To Time, Place, And Person] : oriented to person, place, and time [Affect] : the affect was normal

## 2022-04-27 LAB
ALBUMIN SERPL ELPH-MCNC: 4.2 G/DL
ALP BLD-CCNC: 122 U/L
ALT SERPL-CCNC: 42 U/L
ANION GAP SERPL CALC-SCNC: 13 MMOL/L
AST SERPL-CCNC: 35 U/L
BASOPHILS # BLD AUTO: 0.03 K/UL
BASOPHILS NFR BLD AUTO: 0.4 %
BILIRUB SERPL-MCNC: 0.3 MG/DL
BUN SERPL-MCNC: 14 MG/DL
CALCIUM SERPL-MCNC: 9.3 MG/DL
CD16+CD56+ CELLS # BLD: 238 /UL
CD16+CD56+ CELLS NFR BLD: 11 %
CD19 CELLS NFR BLD: 391 /UL
CD3 CELLS # BLD: 1554 /UL
CD3 CELLS NFR BLD: 71 %
CD3+CD4+ CELLS # BLD: 572 /UL
CD3+CD4+ CELLS NFR BLD: 26 %
CD3+CD4+ CELLS/CD3+CD8+ CLL SPEC: 0.58 RATIO
CD3+CD8+ CELLS # SPEC: 983 /UL
CD3+CD8+ CELLS NFR BLD: 45 %
CELLS.CD3-CD19+/CELLS IN BLOOD: 18 %
CHLORIDE SERPL-SCNC: 104 MMOL/L
CO2 SERPL-SCNC: 17 MMOL/L
CREAT SERPL-MCNC: 0.8 MG/DL
EGFR: 94 ML/MIN/1.73M2
EOSINOPHIL # BLD AUTO: 0.15 K/UL
EOSINOPHIL NFR BLD AUTO: 2.2 %
GLUCOSE SERPL-MCNC: 146 MG/DL
HCT VFR BLD CALC: 48.5 %
HGB BLD-MCNC: 15.1 G/DL
IMM GRANULOCYTES NFR BLD AUTO: 0.3 %
LYMPHOCYTES # BLD AUTO: 2.18 K/UL
LYMPHOCYTES NFR BLD AUTO: 31.4 %
MAN DIFF?: NORMAL
MCHC RBC-ENTMCNC: 31.1 G/DL
MCHC RBC-ENTMCNC: 32.3 PG
MCV RBC AUTO: 103.6 FL
MONOCYTES # BLD AUTO: 0.44 K/UL
MONOCYTES NFR BLD AUTO: 6.3 %
NEUTROPHILS # BLD AUTO: 4.12 K/UL
NEUTROPHILS NFR BLD AUTO: 59.4 %
PLATELET # BLD AUTO: 286 K/UL
POTASSIUM SERPL-SCNC: 4.3 MMOL/L
PROT SERPL-MCNC: 7.4 G/DL
RBC # BLD: 4.68 M/UL
RBC # FLD: 12.6 %
SODIUM SERPL-SCNC: 134 MMOL/L
VIRAL LOAD INTERP: NOT DETECTED COPIES/ML
VIRAL LOAD LOG: NOT DETECTED LOGCOPIES/ML
WBC # FLD AUTO: 6.94 K/UL

## 2022-04-28 LAB
HBV SURFACE AB SER QL: NONREACTIVE
T PALLIDUM AB SER QL IA: NEGATIVE

## 2022-04-29 LAB
M TB IFN-G BLD-IMP: NEGATIVE
QUANTIFERON TB PLUS MITOGEN MINUS NIL: 9.97 IU/ML
QUANTIFERON TB PLUS NIL: 0.03 IU/ML
QUANTIFERON TB PLUS TB1 MINUS NIL: 0.02 IU/ML
QUANTIFERON TB PLUS TB2 MINUS NIL: 0.02 IU/ML

## 2022-05-23 ENCOUNTER — LABORATORY RESULT (OUTPATIENT)
Age: 42
End: 2022-05-23

## 2022-05-31 ENCOUNTER — APPOINTMENT (OUTPATIENT)
Dept: INTERNAL MEDICINE | Facility: CLINIC | Age: 42
End: 2022-05-31
Payer: MEDICAID

## 2022-05-31 ENCOUNTER — NON-APPOINTMENT (OUTPATIENT)
Age: 42
End: 2022-05-31

## 2022-05-31 ENCOUNTER — OUTPATIENT (OUTPATIENT)
Dept: OUTPATIENT SERVICES | Facility: HOSPITAL | Age: 42
LOS: 1 days | Discharge: HOME | End: 2022-05-31

## 2022-05-31 VITALS
BODY MASS INDEX: 36.41 KG/M2 | HEIGHT: 67 IN | WEIGHT: 232 LBS | OXYGEN SATURATION: 95 % | SYSTOLIC BLOOD PRESSURE: 107 MMHG | TEMPERATURE: 96.8 F | DIASTOLIC BLOOD PRESSURE: 67 MMHG | HEART RATE: 75 BPM

## 2022-05-31 PROCEDURE — 99214 OFFICE O/P EST MOD 30 MIN: CPT | Mod: GC

## 2022-05-31 NOTE — HISTORY OF PRESENT ILLNESS
[FreeTextEntry1] : Here for follow up [de-identified] : 40yo woman\par PMH of HIV (on Biktarvy, viral load undetectable), HSV, PCOS, Obesity and DM II (last A1c 8.2) \par \par presents for a follow up. \par \par Patient reports she has tried Metformin in the past but had severe headaches and body aches. she is complaint with her Januvia \par She is followed by Dr BREONNA Astudillo for her HIV - June 26 next visit \par She reports worsening distance vision and is planning to follow with opthalmology \par She has a podiatry appt in June \par

## 2022-05-31 NOTE — ASSESSMENT
[FreeTextEntry1] : Ms Rayshawn Aden is a 41 WHITNEY w a PMH of HIV, obesity and DM.\par \par #DM\par - A1c =8.4 > 8.2 f/u repeat \par - unable to tolerate metformin\par - patient to follow with podiatry and opthalmology appointments\par - f/u Endocrinology reccs\par - Jenuvia 50 qd -> 100 until further recommendations\par \par #HIV\par - Pateint follows w Dr Cota\par - dx 2/2019 with PCP PNA and vaginal HSV outbreak- likely via sexual contact at time of dx CD4 70; 5% HIV-1 RNA Quantitative, Viral Load: [id number] (02.12.19 @ 00:45)\par - Toxo IgG neg, CMV IgG +, RPR neg\par - outpatient labs CD4 537;  2/2022\par - continue biktarvy\par \par # HLD \par - atorvastatin 40 qd started \par \par #Hx indeterminate quantiferon/PPD\par -PPD negative per Pt \par \par #Wellness\par - Pap in Aug negative \par - Pt to schedule appt for mammogram \par - immunizations - to bring in records for next visit \par - recent COVID dx ; will wait 4mo for 3rd shot per ID \par \par rto in 6 months and prn. \par

## 2022-06-01 DIAGNOSIS — E66.9 OBESITY, UNSPECIFIED: ICD-10-CM

## 2022-06-01 DIAGNOSIS — E78.5 HYPERLIPIDEMIA, UNSPECIFIED: ICD-10-CM

## 2022-06-01 DIAGNOSIS — Z00.00 ENCOUNTER FOR GENERAL ADULT MEDICAL EXAMINATION WITHOUT ABNORMAL FINDINGS: ICD-10-CM

## 2022-06-01 DIAGNOSIS — E11.9 TYPE 2 DIABETES MELLITUS WITHOUT COMPLICATIONS: ICD-10-CM

## 2022-06-03 ENCOUNTER — NON-APPOINTMENT (OUTPATIENT)
Age: 42
End: 2022-06-03

## 2022-06-28 ENCOUNTER — OUTPATIENT (OUTPATIENT)
Dept: OUTPATIENT SERVICES | Facility: HOSPITAL | Age: 42
LOS: 1 days | Discharge: HOME | End: 2022-06-28

## 2022-06-28 ENCOUNTER — APPOINTMENT (OUTPATIENT)
Dept: OBGYN | Facility: CLINIC | Age: 42
End: 2022-06-28
Payer: MEDICAID

## 2022-06-28 VITALS
BODY MASS INDEX: 36.73 KG/M2 | DIASTOLIC BLOOD PRESSURE: 62 MMHG | HEIGHT: 67 IN | SYSTOLIC BLOOD PRESSURE: 118 MMHG | WEIGHT: 234 LBS

## 2022-06-28 DIAGNOSIS — B97.7 PAPILLOMAVIRUS AS THE CAUSE OF DISEASES CLASSIFIED ELSEWHERE: ICD-10-CM

## 2022-06-28 DIAGNOSIS — N87.0 MILD CERVICAL DYSPLASIA: ICD-10-CM

## 2022-06-28 DIAGNOSIS — Z86.19 PERSONAL HISTORY OF OTHER INFECTIOUS AND PARASITIC DISEASES: ICD-10-CM

## 2022-06-28 PROCEDURE — 99213 OFFICE O/P EST LOW 20 MIN: CPT

## 2022-07-05 ENCOUNTER — APPOINTMENT (OUTPATIENT)
Dept: NUTRITION | Facility: CLINIC | Age: 42
End: 2022-07-05

## 2022-07-06 ENCOUNTER — APPOINTMENT (OUTPATIENT)
Dept: ENDOCRINOLOGY | Facility: CLINIC | Age: 42
End: 2022-07-06

## 2022-07-26 ENCOUNTER — OUTPATIENT (OUTPATIENT)
Dept: OUTPATIENT SERVICES | Facility: HOSPITAL | Age: 42
LOS: 1 days | Discharge: HOME | End: 2022-07-26

## 2022-07-26 ENCOUNTER — APPOINTMENT (OUTPATIENT)
Dept: INFECTIOUS DISEASE | Facility: CLINIC | Age: 42
End: 2022-07-26

## 2022-07-26 VITALS
WEIGHT: 234 LBS | SYSTOLIC BLOOD PRESSURE: 129 MMHG | HEIGHT: 67 IN | RESPIRATION RATE: 14 BRPM | OXYGEN SATURATION: 97 % | DIASTOLIC BLOOD PRESSURE: 86 MMHG | TEMPERATURE: 98.9 F | HEART RATE: 77 BPM | BODY MASS INDEX: 36.73 KG/M2

## 2022-07-26 DIAGNOSIS — E78.5 HYPERLIPIDEMIA, UNSPECIFIED: ICD-10-CM

## 2022-07-26 DIAGNOSIS — E66.9 OBESITY, UNSPECIFIED: ICD-10-CM

## 2022-07-26 DIAGNOSIS — Z30.431 ENCOUNTER FOR ROUTINE CHECKING OF INTRAUTERINE CONTRACEPTIVE DEVICE: ICD-10-CM

## 2022-07-26 DIAGNOSIS — Z79.899 OTHER LONG TERM (CURRENT) DRUG THERAPY: ICD-10-CM

## 2022-07-26 DIAGNOSIS — Z11.3 ENCOUNTER FOR SCREENING FOR INFECTIONS WITH A PREDOMINANTLY SEXUAL MODE OF TRANSMISSION: ICD-10-CM

## 2022-07-26 DIAGNOSIS — B20 HUMAN IMMUNODEFICIENCY VIRUS [HIV] DISEASE: ICD-10-CM

## 2022-07-26 DIAGNOSIS — R74.01 ELEVATION OF LEVELS OF LIVER TRANSAMINASE LEVELS: ICD-10-CM

## 2022-07-26 DIAGNOSIS — E11.9 TYPE 2 DIABETES MELLITUS WITHOUT COMPLICATIONS: ICD-10-CM

## 2022-07-26 DIAGNOSIS — Z23 ENCOUNTER FOR IMMUNIZATION: ICD-10-CM

## 2022-07-26 PROCEDURE — 99214 OFFICE O/P EST MOD 30 MIN: CPT

## 2022-07-26 NOTE — PHYSICAL EXAM
[General Appearance - Alert] : alert [General Appearance - In No Acute Distress] : in no acute distress [Sclera] : the sclera and conjunctiva were normal [PERRL With Normal Accommodation] : pupils were equal in size, round, reactive to light [Extraocular Movements] : extraocular movements were intact [Outer Ear] : the ears and nose were normal in appearance [Oropharynx] : the oropharynx was normal with no thrush [Neck Appearance] : the appearance of the neck was normal [Neck Cervical Mass (___cm)] : no neck mass was observed [Jugular Venous Distention Increased] : there was no jugular-venous distention [Thyroid Diffuse Enlargement] : the thyroid was not enlarged [Auscultation Breath Sounds / Voice Sounds] : lungs were clear to auscultation bilaterally [Heart Rate And Rhythm] : heart rate was normal and rhythm regular [Heart Sounds] : normal S1 and S2 [Heart Sounds Gallop] : no gallops [Murmurs] : no murmurs [Heart Sounds Pericardial Friction Rub] : no pericardial rub [Full Pulse] : the pedal pulses are present [Edema] : there was no peripheral edema [Bowel Sounds] : normal bowel sounds [Abdomen Tenderness] : non-tender [Abdomen Soft] : soft [Abdomen Mass (___ Cm)] : no abdominal mass palpated [Costovertebral Angle Tenderness] : no CVA tenderness [No Palpable Adenopathy] : no palpable adenopathy [Musculoskeletal - Swelling] : no joint swelling [Nail Clubbing] : no clubbing  or cyanosis of the fingernails [Motor Tone] : muscle strength and tone were normal [Skin Color & Pigmentation] : normal skin color and pigmentation [] : no rash [Deep Tendon Reflexes (DTR)] : deep tendon reflexes were 2+ and symmetric [Sensation] : the sensory exam was normal to light touch and pinprick [No Focal Deficits] : no focal deficits [Oriented To Time, Place, And Person] : oriented to person, place, and time [Affect] : the affect was normal

## 2022-07-26 NOTE — ASSESSMENT
[Treatment Education] : treatment education [FreeTextEntry1] : #AIDS, now well controlled\par - dx 2/2019 with PCP PNA and vaginal HSV outbreak- likely via sexual contact\par - at time of dx CD4 70; 5% HIV-1 RNA Quantitative, Viral Load: [id number] (02.12.19 @ 00:45)\par Toxo IgG neg, CMV IgG +, RPR neg\par - outpatient labs CD4 537;  2/2022\par - continue biktarvy\par - check labs \par \par #Hx HSV Herpes\par - Continue Valtrex 1g daily for prophylaxis\par \par #Transaminitis 4/2022, 5/2022\par - could be related to fatty liver\par - only new med januvia\par - trend, if contoinue to uptrend- RUQ US\par \par #DM A1c 8.2\par - Cont januvia- increased to 100mg\par - Follows with Endo- cannot get appt until 11/2022, will try to get earlier\par - patient to follow with podiatry and opthalmology appointments\par \par # HLD \par - Cont atorvastatin 40 qd started \par \par #HCM \par - need vaccine/ pneumonia records\par - HPV vaccine w/ GYN\par - COVID 2 x pfizer recommended booster \par - Flu UTD\par - 12/2021 COVID+\par - 4/6/22 pap neg, +BV, treated\par \par I have personally reviewed all the available charts, laboratory data, radiology and consultation notes\par 31 min spent counseling patient.

## 2022-07-27 LAB — GGT SERPL-CCNC: 64 U/L

## 2022-08-02 NOTE — DIETITIAN INITIAL EVALUATION ADULT. - PERTINENT LABORATORY DATA
Copy sent to abstract  Message regarding completion and method of delivery sent to patient via SpotOn  Original held at my desk until reply received  
Done  DM  
No response from call and MyC message to see method of delivery.  Placed letter in outgoing mail and advised patient via MyC    
Reason for Call:  Form, our goal is to have forms completed with 72 hours, however, some forms may require a visit or additional information.    Type of letter, form or note:  medical    Who is the form from?: Patient    Where did the form come from: Patient or family brought in       What clinic location was the form placed at?: [facility]    Where the form was placed: Dr. Caal's form folder POD B    What number is listed as a contact on the form?: N/A       Additional comments: N/A    Call taken on 7/25/2022 at 10:07 AM by Aissatou Hess      
(2/14) hgb 11.9, hct 25.8, BUN 9, Cr 154, AST 66

## 2022-08-07 LAB
CD16+CD56+ CELLS # BLD: 255 /UL
CD16+CD56+ CELLS NFR BLD: 11 %
CD19 CELLS NFR BLD: 404 /UL
CD3 CELLS # BLD: 1555 /UL
CD3 CELLS NFR BLD: 70 %
CD3+CD4+ CELLS # BLD: 623 /UL
CD3+CD4+ CELLS NFR BLD: 28 %
CD3+CD4+ CELLS/CD3+CD8+ CLL SPEC: 0.65 RATIO
CD3+CD8+ CELLS # SPEC: 953 /UL
CD3+CD8+ CELLS NFR BLD: 43 %
CELLS.CD3-CD19+/CELLS IN BLOOD: 18 %
CREAT SPEC-SCNC: 180 MG/DL
CREAT/PROT UR: 0.1 RATIO
HBV CORE IGG+IGM SER QL: NONREACTIVE
HBV SURFACE AB SER QL: NONREACTIVE
HBV SURFACE AG SER QL: NONREACTIVE
HCV AB SER QL: NONREACTIVE
HCV S/CO RATIO: 0.12 S/CO
HIV1 RNA # SERPL NAA+PROBE: <20 COPIES/ML
PROT UR-MCNC: 16 MG/DLG/24H
VIRAL LOAD INTERP: DETECTED COPIES/ML
VIRAL LOAD LOG: <1.3 LOGCOPIES/ML

## 2022-08-08 ENCOUNTER — OUTPATIENT (OUTPATIENT)
Dept: OUTPATIENT SERVICES | Facility: HOSPITAL | Age: 42
LOS: 1 days | Discharge: HOME | End: 2022-08-08

## 2022-08-08 ENCOUNTER — RESULT REVIEW (OUTPATIENT)
Age: 42
End: 2022-08-08

## 2022-08-08 DIAGNOSIS — B20 HUMAN IMMUNODEFICIENCY VIRUS [HIV] DISEASE: ICD-10-CM

## 2022-08-08 DIAGNOSIS — R74.01 ELEVATION OF LEVELS OF LIVER TRANSAMINASE LEVELS: ICD-10-CM

## 2022-08-08 PROCEDURE — 76705 ECHO EXAM OF ABDOMEN: CPT | Mod: 26

## 2022-08-19 ENCOUNTER — EMERGENCY (EMERGENCY)
Facility: HOSPITAL | Age: 42
LOS: 0 days | Discharge: HOME | End: 2022-08-19
Attending: STUDENT IN AN ORGANIZED HEALTH CARE EDUCATION/TRAINING PROGRAM | Admitting: STUDENT IN AN ORGANIZED HEALTH CARE EDUCATION/TRAINING PROGRAM

## 2022-08-19 VITALS
RESPIRATION RATE: 18 BRPM | TEMPERATURE: 98 F | WEIGHT: 235.01 LBS | SYSTOLIC BLOOD PRESSURE: 137 MMHG | OXYGEN SATURATION: 97 % | DIASTOLIC BLOOD PRESSURE: 60 MMHG | HEART RATE: 73 BPM | HEIGHT: 62 IN

## 2022-08-19 DIAGNOSIS — J02.9 ACUTE PHARYNGITIS, UNSPECIFIED: ICD-10-CM

## 2022-08-19 DIAGNOSIS — R05.1 ACUTE COUGH: ICD-10-CM

## 2022-08-19 DIAGNOSIS — M79.10 MYALGIA, UNSPECIFIED SITE: ICD-10-CM

## 2022-08-19 DIAGNOSIS — R05.8 OTHER SPECIFIED COUGH: ICD-10-CM

## 2022-08-19 DIAGNOSIS — E11.65 TYPE 2 DIABETES MELLITUS WITH HYPERGLYCEMIA: ICD-10-CM

## 2022-08-19 DIAGNOSIS — Z21 ASYMPTOMATIC HUMAN IMMUNODEFICIENCY VIRUS [HIV] INFECTION STATUS: ICD-10-CM

## 2022-08-19 DIAGNOSIS — Z79.84 LONG TERM (CURRENT) USE OF ORAL HYPOGLYCEMIC DRUGS: ICD-10-CM

## 2022-08-19 PROCEDURE — 71046 X-RAY EXAM CHEST 2 VIEWS: CPT | Mod: 26

## 2022-08-19 PROCEDURE — 99284 EMERGENCY DEPT VISIT MOD MDM: CPT

## 2022-08-19 RX ORDER — IBUPROFEN 200 MG
1 TABLET ORAL
Qty: 21 | Refills: 0
Start: 2022-08-19 | End: 2022-08-25

## 2022-08-19 RX ORDER — IBUPROFEN 200 MG
800 TABLET ORAL ONCE
Refills: 0 | Status: COMPLETED | OUTPATIENT
Start: 2022-08-19 | End: 2022-08-19

## 2022-08-19 RX ORDER — SITAGLIPTIN 50 MG/1
1 TABLET, FILM COATED ORAL
Qty: 14 | Refills: 0
Start: 2022-08-19 | End: 2022-09-01

## 2022-08-19 RX ADMIN — Medication 800 MILLIGRAM(S): at 16:16

## 2022-08-19 NOTE — ED PROVIDER NOTE - OBJECTIVE STATEMENT
Pt is a 42y/o female with pmhx of HIV last CD4 count 10,00 viral load undetectable, DM here for eval of sore thorat, prod cough with clear sputum and body aches x 4 days. Pt denies SOB, , fever, chills, aBD PAIN

## 2022-08-19 NOTE — ED PROVIDER NOTE - NS ED MD DISPO DISCHARGE CCDA
Patient/Caregiver provided printed discharge information.
Breath sounds clear and equal bilaterally.

## 2022-08-19 NOTE — ED PROVIDER NOTE - PATIENT PORTAL LINK FT
You can access the FollowMyHealth Patient Portal offered by [facility] by registering at the following website: [email or web address]. By joining [employer]’s FollowMyHealth portal, you will also be able to view your health information using other applications (apps) compatible with our system.

## 2022-08-19 NOTE — ED PROVIDER NOTE - NS ED ROS FT
Eyes:  No visual changes, eye pain or discharge.  ENMT:  + sore throat No hearing changes, pain, discharge or infections. No neck pain or stiffness.  Cardiac:  No chest pain, SOB or edema  Respiratory: + cough  No  respiratory distress. No hemoptysis. No history of asthma or RAD.  GI:  No nausea, vomiting, diarrhea or abdominal pain.  MS:  No myalgia, muscle weakness, joint pain or back pain.  Neuro:  No headache or weakness.  No LOC.  Skin:  No skin rash.   Endocrine: + dm No history of thyroid disease   Except as documented in the HPI,  all other systems are negative.

## 2022-08-19 NOTE — ED PROVIDER NOTE - ATTENDING APP SHARED VISIT CONTRIBUTION OF CARE
40y/o female with pmhx of HIV last CD4 count 10,00 viral load undetectable, DM   pt presents for eval of multiple complaints including ST, cough w/ clear sputum, myalgias, sinus pressure. no fever/chills/cp/sob/ap.  pt tolerating PO but having some pain w/ swallowing. no difficulty breathing    vss  gen- NAD, aaox3  HENT- no lip/tongue/uvula/tonsillar swelling, no exhudates on tonsils, uvula midline, base of tongue normal, pt tolerating secretions. no drooling, no stridor.  no hot potato voice  card-rrr  lungs-ctab, no wheezing or rhonchi  abd-sntnd, no guarding or rebound  neuro- full str/sensation, cn ii-xii grossly intact, normal coordination     likely viral uri, will get fs, cxr  will provide supportive care, serial exam and ED observation period

## 2022-08-19 NOTE — ED PROVIDER NOTE - PHYSICAL EXAMINATION
VITAL SIGNS: I have reviewed nursing notes and confirm.  CONSTITUTIONAL: Well-developed; well-nourished; in no acute distress.   SKIN: skin exam is warm and dry, no acute rash.    HEAD: Normocephalic; atraumatic.  EYES:  conjunctiva and sclera clear.  ENT: speaking full sentences no sublingual edema, airway patent No nasal discharge; airway clear.  CARD: S1, S2 normal; no murmurs, gallops, or rubs. Regular rate and rhythm.   RESP: No wheezes, rales or rhonchi.  EXT: Normal ROM.  No clubbing, cyanosis or edema.   NEURO: Alert, oriented, grossly unremarkable

## 2022-08-19 NOTE — ED PROVIDER NOTE - CLINICAL SUMMARY MEDICAL DECISION MAKING FREE TEXT BOX
pt observed in ed, no resp distress, pt tolerating secretions.  cxr clear, fs mildly elevated  will refil dm meds, rec pcp f/u, return precautions. likely viral uri

## 2022-08-22 ENCOUNTER — EMERGENCY (EMERGENCY)
Facility: HOSPITAL | Age: 42
LOS: 0 days | Discharge: HOME | End: 2022-08-22
Attending: EMERGENCY MEDICINE | Admitting: EMERGENCY MEDICINE

## 2022-08-22 VITALS
DIASTOLIC BLOOD PRESSURE: 82 MMHG | OXYGEN SATURATION: 97 % | HEART RATE: 78 BPM | WEIGHT: 220.02 LBS | SYSTOLIC BLOOD PRESSURE: 141 MMHG | RESPIRATION RATE: 18 BRPM | TEMPERATURE: 97 F | HEIGHT: 62 IN

## 2022-08-22 DIAGNOSIS — Z79.84 LONG TERM (CURRENT) USE OF ORAL HYPOGLYCEMIC DRUGS: ICD-10-CM

## 2022-08-22 DIAGNOSIS — R05.8 OTHER SPECIFIED COUGH: ICD-10-CM

## 2022-08-22 DIAGNOSIS — J02.9 ACUTE PHARYNGITIS, UNSPECIFIED: ICD-10-CM

## 2022-08-22 DIAGNOSIS — R09.81 NASAL CONGESTION: ICD-10-CM

## 2022-08-22 DIAGNOSIS — Z21 ASYMPTOMATIC HUMAN IMMUNODEFICIENCY VIRUS [HIV] INFECTION STATUS: ICD-10-CM

## 2022-08-22 DIAGNOSIS — R59.0 LOCALIZED ENLARGED LYMPH NODES: ICD-10-CM

## 2022-08-22 DIAGNOSIS — E11.9 TYPE 2 DIABETES MELLITUS WITHOUT COMPLICATIONS: ICD-10-CM

## 2022-08-22 PROCEDURE — 99283 EMERGENCY DEPT VISIT LOW MDM: CPT

## 2022-08-22 RX ORDER — AMOXICILLIN 250 MG/5ML
1 SUSPENSION, RECONSTITUTED, ORAL (ML) ORAL
Qty: 20 | Refills: 0
Start: 2022-08-22 | End: 2022-08-31

## 2022-08-22 NOTE — ED PROVIDER NOTE - NS ED ROS FT
Constitutional:  No fevers or chills.  Eyes:  No visual changes, eye pain, or discharge.  ENT:  (+) sore throat. Congestion.  Neck:  No neck pain or stiffness.  Cardiac:  No CP or edema.  Resp:  (+) productive cough with green sputum  GI:  No nausea, vomiting, diarrhea, or abdominal pain.  :  No dysuria, frequency, or hematuria.  MSK:  No myalgias or joint pain/swelling.  Neuro:  No headache, dizziness, or weakness.  Skin:  No skin rash.

## 2022-08-22 NOTE — ED PROVIDER NOTE - PATIENT PORTAL LINK FT
You can access the FollowMyHealth Patient Portal offered by [facility] by registering at the following website: [email or web address]. By joining [employer]’s FollowMyHealth portal, you will also be able to view your health information using other applications (apps) compatible with our system.

## 2022-08-22 NOTE — ED PROVIDER NOTE - PHYSICAL EXAMINATION
PHYSICAL EXAM: I have reviewed current vital signs.  GENERAL: NAD, well-nourished; well-developed.  HEAD:  Normocephalic, atraumatic.  EYES: EOMI, PERRL, conjunctiva and sclera clear.  ENT: Trace erythema, no exudates.  NECK: Supple, no JVD.  CHEST/LUNG: Clear to auscultation bilaterally; no wheezes, rales, or rhonchi.  HEART: Regular rate and rhythm, normal S1 and S2; no murmurs, rubs, or gallops.  ABDOMEN: Soft, nontender, nondistended.  EXTREMITIES:  2+ peripheral pulses; no clubbing, cyanosis, or edema.  PSYCH: Cooperative, appropriate, normal mood and affect.  NEUROLOGY: A&O x 3.  SKIN: Warm and dry.

## 2022-08-22 NOTE — ED PROVIDER NOTE - PROGRESS NOTE DETAILS
AE: As this is the second visit for similar complaints after patient has not had relief with previous treatments, will prescribe antibiotics and discharge home.

## 2022-08-22 NOTE — ED PROVIDER NOTE - OBJECTIVE STATEMENT
40 yo F with PMH of HIV and DM presents to the ED complaining of sore throat, congestion, and sinus pressure for 3 days. Patient was evaluated in the ED at that time and given Motrin with no relief. Patient also tried Tylenol, DayQuil, and Contac at home with no relief. Patient has also developed a productive cough with green sputum. Patient denies fever, chills, headache, dizziness, chest pain, nausea, vomiting, and diarrhea.

## 2022-08-22 NOTE — ED PROVIDER NOTE - CARE PROVIDER_API CALL
LULI COTO  Internal Medicine  ADELA ROCKWELL, Phys,    Phone: [phone]  Fax: [phone]  Follow Up Time: 1-3 Days

## 2022-08-22 NOTE — ED PROVIDER NOTE - NSFOLLOWUPINSTRUCTIONS_ED_ALL_ED_FT
Faringitis    LO QUE NECESITA SABER:    ¿Qué es la faringitis?La faringitis o dolor de garganta es la inflamación de los tejidos y estructuras en shah faringe (garganta). La faringitis es generalmente causada por manjinder bacteria. También podría ser causada por un resfriado o el virus de la gripe. Otras causas incluyen el fumar, las alergias o el reflujo estomacal.    ¿Qué signos y síntomas pueden ocurrir con la faringitis?  •Dolor de garganta o dolor al tragar      •Fiebre, escalofríos y yoko corporales      •Ronquera o voz áspera      •Tos, flujo o congestión nasal, comezón en los ojos u ojos llorosos      •Dolor de bindu      •Malestar estomacal y pérdida de apetito      •Rigidez leve en el camila      •Glándulas inflamadas que se sienten joan bultos duros cuando se toca el camila      •Ampollas marisabel y álvaro llenas de pus en la parte de atrás de la garganta      ¿Cómo se diagnostica la faringitis?Informe a shah médico acerca de vandana síntomas. Posiblemente le revisará por dentro de la garganta y le palpará el camila. También es posible que deba hacerse los siguientes exámenes:   •Un cultivo de gargantapuede detectar el germen que está causándole el dolor de garganta. Un cultivo de garganta se realiza frotando un hisopo de algodón contra la parte posterior de shah garganta.      •Los análisis de sangrese podrían usar para mostrar si alguna otra condición médica está causando shah dolor de garganta.      ¿Cómo se trata la faringitis?La faringitis viral desaparecerá por sí chuck sin necesidad de tratamiento. Shah dolor de garganta empezará a mejorar dentro de 3 a 5 días en ambos casos de infecciones virales o bacteriales. Es posible que usted necesite alguno de los siguientes:   •Los antibióticostratan las infecciones bacterianas.      •AINEcomo el ibuprofeno, ayudan a disminuir la inflamación, el dolor y la fiebre. Los DI pueden causar sangrado estomacal o problemas renales en ciertas personas. Si usted juan daniel un medicamento anticoagulante, siempre pregúntele a shah médico si los DI son seguros para usted. Siempre letha la etiqueta de manasa medicamento y siga las instrucciones.      •Acetaminofénalivia el dolor y baja la fiebre. Está disponible sin receta médica. Pregunte la cantidad y la frecuencia con que debe tomarlos. Siga las indicaciones. El acetaminofén puede causar daño en el hígado cuando no se juan daniel de forma correcta.      ¿Cómo puedo controlar los síntomas?  •Enoc gárgaras de agua con sal.Mezcle ¼ de cucharadita de sal en un vaso con 8 onzas de agua tibia y enoc gárgaras. [place] podría ayudar a reducir la inflamación en shah garganta.      •[place] líquidos joan se le haya indicado.Es posible que usted necesite ingerir más líquidos de lo habitual. Los líquidos pueden ayudar a aliviar shah garganta y prevenir la deshidratación. Pregunte cuánto líquido debe evy cada día y cuáles líquidos son los más adecuados para usted.      •Use un humidificador de vapor fríopara ayudar a humedecer el aire en shah habitación y aliviar shah tos.      •Alivie shah gargantacon pastillas para la tos, hielo y alimentos blandos o helados de agua.      ¿Cómo puedo prevenir la propagación de la faringitis?Cúbrase la boca y nariz cuando tose o estornuda. No comparta alimentos o bebidas. Lávese las sina frecuentemente. Utilice agua y jabón. Si no tiene agua y jabón disponibles, entonces puede usar un alcohol para sina en gel.    Llame al 911 en carmen de presentar lo siguiente:  •Usted tiene dificultad para respirar o tragar porque shah garganta está inflamada o adolorida.          ¿Cuándo sintia buscar atención inmediata?  •Usted está babeando porque le duele demasiado tragar.      •Usted tiene fiebre por encima de 102°F (39°C) o le dura más de 3 días.      •Usted está confundido.      •Usted siente sabor a abisai en shah garganta.      ¿Cuándo sintia comunicarme con mi médico?  •Shah dolor de garganta empeora.      •Usted tiene un bulto adolorido en shah garganta que no se layne después de 5 días.      •Vandana síntomas no mejoran después de 5 días.      •Usted tiene preguntas o inquietudes acerca de shah condición o cuidado.      ACUERDOS SOBRE SHAH CUIDADO:    Usted tiene el derecho de ayudar a planear shah cuidado. Aprenda todo lo que pueda sobre shah condición y joan darle tratamiento. Discuta vandana opciones de tratamiento con vandana médicos para decidir el cuidado que usted desea recibir. Usted siempre tiene el derecho de rechazar el tratamiento.

## 2022-08-22 NOTE — ED PROVIDER NOTE - ATTENDING CONTRIBUTION TO CARE
41-year-old female history of HIV disease, DM presents with sore throat for the past 3 days, persistent, denies modifying factors, associated cough, nasal congestion and sinus pressure, denies fever, chills, facial swelling, dysphagia, odynophagia, hoarseness, difficulty breathing, lymphadenopathy  or other associated complaints at present.    Old chart reviewed.  I have reviewed and agree with the initial nursing note, except as documented in my note.    VSS, awake, alert, non-toxic appearing, lying comfortably in stretcher, in NAD, the pharynx is mildly erythematous and tonsils appear normal, no exudates, there is no peritonsillar swelling, the submandibular space is neither swollen nor painful, no airway compromise, able to swallow and there is no drooling, voice is normal, mouth, lips, gingiva are moist and without trauma, uvula is midline, no stridor, + anterior cervical lymphadenopathy, no skin rash or lesions, chest CTAB, no respiratory distress or retractions, no w/r/r, +S1/S2, RRR, no m/r/g, abdomen soft, NT, ND, +BS, no organomegaly appreciated, AO x 3, clear speech and steady gait.        42 yo F with PMH of HIV and DM presents to the ED complaining of sore throat, congestion, and sinus pressure for 3 days. Patient was evaluated in the ED at that time and given Motrin with no relief. Patient also tried Tylenol, DayQuil, and Contac at home with no relief. Patient has also developed a productive cough with green sputum. Patient denies fever, chills, headache, dizziness, chest pain, nausea, vomiting, and diarrhea.

## 2022-08-22 NOTE — ED ADULT NURSE NOTE - HISTORY OF COVID-19 VACCINATION
Patient came in for 2 Allergy injection. Patient had no reaction to last shots. I gave 0.5 ml of the red bottle subcutaneous without any issues.   Patient instructed to wait in building for at least 30 min. after injections  Patients comes into clinic today at the request of  for allergy injections     No reaction to last injection.     This service provided today was under the direct supervision of  , who was available if needed.     Carmen Paniagua RN      Vaccine status unknown

## 2022-08-22 NOTE — ED ADULT NURSE NOTE - NSIMPLEMENTINTERV_GEN_ALL_ED
Implemented All Universal Safety Interventions:  [place] to call system. Call bell, personal items and telephone within reach. Instruct patient to call for assistance. Room bathroom lighting operational. Non-slip footwear when patient is off stretcher. Physically safe environment: no spills, clutter or unnecessary equipment. Stretcher in lowest position, wheels locked, appropriate side rails in place.

## 2022-08-26 ENCOUNTER — APPOINTMENT (OUTPATIENT)
Dept: OBGYN | Facility: CLINIC | Age: 42
End: 2022-08-26

## 2022-08-26 ENCOUNTER — OUTPATIENT (OUTPATIENT)
Dept: OUTPATIENT SERVICES | Facility: HOSPITAL | Age: 42
LOS: 1 days | Discharge: HOME | End: 2022-08-26

## 2022-08-26 VITALS
DIASTOLIC BLOOD PRESSURE: 82 MMHG | BODY MASS INDEX: 36.41 KG/M2 | WEIGHT: 232 LBS | SYSTOLIC BLOOD PRESSURE: 108 MMHG | HEIGHT: 67 IN

## 2022-08-26 PROCEDURE — 90651 9VHPV VACCINE 2/3 DOSE IM: CPT

## 2022-08-26 PROCEDURE — 90471 IMMUNIZATION ADMIN: CPT

## 2022-08-26 PROCEDURE — 99212 OFFICE O/P EST SF 10 MIN: CPT | Mod: 25

## 2022-08-29 NOTE — ED ADULT NURSE NOTE - PMH
Family Medicine Daily Progress Note     Date:  8/29/2022    Attending:  Fransisco Bejarano DO     S:  Werner Osuna is a 65 year old male who was admitted on 7/27/2022     Patient seen and examined, no new questions today, no chest pains, no abdominal pains    REVIEW OF SYSTEMS:   Constitutional:  [x]? no fever  [x]? no chills  []? no weakness  []? no fatigue    Skin:  [x]? no rash  []? no bruising  []? no wound  []? no lesion  Head:  []? no head injury   []? no headache   []? no dizziness  Eyes:  [x]? no vision changes   []? wears glasses or contacts   []? no redness   []? no drainage  Ears:  []? no tinnitus   []? no earache   []? hearing aids   []? no hearing changes  Nose:  []? no stuffiness   []? no nosebleeds   []? no drainage  Throat:  []? no soreness   []? no dry mouth   []? no hoarseness  Neck:  []? no swollen glands   []? no pain   []? no stiffness  Respiratory:  []? no cough  []? no wheezing  [x]? no dyspnea   []? no hemoptysis   Cardiovascular:  [x]? no chest pain  []? no chest pressure  []? no palpitations  []? diaphoresis.   Gastrointestinal:  [x]? no nausea []? no vomiting  []? no diarrhea [x]?  No abdominal pain   []? no heartburn  Genitourinary:  []? no urgency  []? no frequency  []? no hematuria  []?no flank pain  Extremities:  [x]? no swelling  []? no joint pain  Neurologic:  []? no change in sensory  [x]?change in motor function  [x]? no headache   [x]? change in speech  Endocrine:  []? no heat or cold intolerance  []? no abnormal weight loss or gain   []? no excessive sweating  Hematological:  []? no bleeding  []? no bruising  []? no adenopathy  Psychiatric:  []? no change in affect  []? change in mentation  []? no sleep disturbance      O:  Vital 24 Hour Range Most Recent Value   Temperature Temp  Min: 97.5 °F (36.4 °C)  Max: 98.7 °F (37.1 °C) 98.7 °F (37.1 °C)   Pulse Pulse  Min: 76  Max: 89 76   Respiratory Resp  Min: 16  Max: 17 17   Blood Pressure BP  Min: 111/66  Max: 114/62 114/62   Pulse  Oximetry SpO2  Min: 96 %  Max: 96 %    O2 No data recorded      Vital Most Recent Value First Value   Weight 75.6 kg (166 lb 10.7 oz) Weight: 83.2 kg (183 lb 6.8 oz)   Height 6' 3\" (190.5 cm) Height: 6' 3\" (190.5 cm)     BP Readings from Last 3 Encounters:   08/28/22 114/62   01/10/22 138/88   12/13/21 138/78     Wt Readings from Last 3 Encounters:   08/25/22 75.6 kg (166 lb 10.7 oz)   01/10/22 80 kg (176 lb 5.9 oz)   12/13/21 80.7 kg (177 lb 14.6 oz)        I/O last 3 completed shifts:  In: 270 [P.O.:270]  Out: -      Scheduled Medications   Current Facility-Administered Medications   Medication Dose Route Frequency Provider Last Rate Last Admin   • digoxin (LANOXIN) tablet 125 mcg  125 mcg Oral Daily Fransisco Player, DO       • bacitracin ointment   Topical Daily Irene Birmingham NP       • amLODIPine (NORVASC) tablet 5 mg  5 mg Oral Daily Fransisco Player, DO   5 mg at 08/28/22 0904   • [Held by provider] aspirin chewable 81 mg  81 mg Oral Daily Fransisco Player, DO   81 mg at 08/25/22 0850   • atorvastatin (LIPITOR) tablet 40 mg  40 mg Oral Nightly Fransisco Player, DO   40 mg at 08/28/22 2134   • folic acid (FOLATE) tablet 1 mg  1 mg Oral Daily Rfansisco Player, DO   1 mg at 08/28/22 0904   • lisinopril (ZESTRIL) tablet 40 mg  40 mg Oral Daily Fransisco Player, DO   40 mg at 08/28/22 0904   • metoPROLOL tartrate (LOPRESSOR) tablet 100 mg  100 mg Oral 2 times per day Fransisco Player, DO   100 mg at 08/28/22 2134   • thiamine (VITAMIN B1) tablet 100 mg  100 mg Oral Daily Fransisco Player, DO   100 mg at 08/28/22 0904   • docusate sodium (COLACE) 50 MG/5ML liquid 200 mg  200 mg Oral Daily Fransisco Player, DO   200 mg at 08/27/22 0927   • pantoprazole (PROTONIX INJECT) injection 40 mg  40 mg Intravenous Daily Nicole Vivas, DO   40 mg at 08/28/22 0904   • [Held by provider] heparin (porcine) injection 5,000 Units  5,000 Units Subcutaneous 3 times per day Leah Stauffer MD   5,000 Units at 08/26/22 0528   • betamethasone  dipropionate (DIPROSONE) 0.05 % cream   Topical BID Leah Stauffer MD   Given at 08/28/22 2133   • [Held by provider] furosemide (LASIX) tablet 20 mg  20 mg Per NG tube Daily Devonpatti Ramos   20 mg at 08/01/22 0840   • sodium chloride (PF) 0.9 % injection 10 mL  10 mL Injection 2 times per day Leah Stauffer MD   10 mL at 08/28/22 2134   • Potassium Standard Replacement Protocol (Levels 3.5 and lower)   Does not apply See Admin Instructions Eryn Ricks MD       • Phosphorus Standard Replacement Protocol   Does not apply See Admin Instructions Eryn Ricks MD       • Magnesium Standard Replacement Protocol   Does not apply See Admin Instructions Eryn Ricks MD            PHYSICAL EXAM:   General: no distress  ENT:  Oral mucous membranes are moist, poor dentition  Cardiovascular: irregularly irregular +S1+S2.  Respiratory:  Normal respiratory effort.  Clear to auscultation anteriorly  Gastrointestinal:  Soft and non tender.  Normal bowel sounds.  No rebound, rigidity or guarding. bandaid over site where PEG tube was pulled.   Musculoskeletal:  No edema b/l LE.  Psychiatric: flattened affect    Most Recent Labs:  Recent Labs   Lab 08/29/22  0355 08/28/22  0346 08/27/22  0412 08/26/22  0403 08/25/22  0537   SODIUM 146* 146* 143   < > 143   POTASSIUM 4.1 4.5 3.9   < > 4.4   CHLORIDE 116* 113* 114*   < > 111*   CO2 26 29 28   < > 27   BUN 39* 36* 36*   < > 21*   CREATININE 0.96 0.96 0.83   < > 0.56*   GLUCOSE 104* 118* 115*   < > 100*   ALBUMIN  --  2.6*  --   --  2.7*   AST  --  25  --   --  42*   BILIRUBIN  --  0.7  --   --  0.8    < > = values in this interval not displayed.     Recent Labs   Lab 08/29/22  0355 08/28/22  1305 08/28/22  0346   WBC 7.5 9.4 10.0   HGB 9.2* 9.7* 9.3*   HCT 29.3* 30.8* 29.6*    257 221   MCV 96.1 97.8 96.4       Imaging   7/27/2022 CT Head  IMPRESSION:    1.  No acute intracranial abnormality.   2.  Chronic left thalamostriate lacunar infarcts and patchy  chronic small  vessel disease.    7/27/2022 CT angio head and neck  IMPRESSION:  CTA HEAD:    *  Occlusion of the basal artery and distal vertebral arteries with  nonopacified, likely occluded right PICA.  Extent of vertebral artery  thrombosis is not entirely certain.  Recommend angiographic assessment as  clinically warranted.  *  Preserved opacification of the basilar tip and bilateral PCAs likely  relate to collateral flow from the posterior communicating arteries.   CTA NECK:    *  No hemodynamically significant extracranial stenosis, occlusion or  dissection.  *  Multiple dental extractions with periapical lucencies involving multiple  right mandibular teeth as discussed. Suggest correlation with dental exam.  CT HEAD W CONTRAST:  *  Acute infarct of the right inferior cerebellum. Mass effect with partial  effacement of the fourth ventricle (posterior inferior cerebellar artery  territory).  Consider MRI for further assessment.  *  Chronic right maxillary sinusitis.    7/28/2022 MRI Brain wo contrast  IMPRESSION:    1.  Multifocal evolving infarcts in the bilateral cerebral hemispheres,  chaz and, to a lesser extent, the midbrain and bilateral occipital poles.  There are petechial blood product, predominantly in the inferior right  cerebellum, without overt hemorrhagic transformation. Continued follow-up  CT is recommended.  2.  Small evolving infarct in the left inferior frontal gyrus without mass  effect or hemorrhage.  3.  Partial effacement of fourth ventricle without evidence for herniation  or hydrocephalus.     7/29/2022 CT head wo contrast  IMPRESSION:  *  Evolving subacute infarcts present throughout the bilateral cerebellar  hemispheres, left occipital lobe, left frontal lobe, and left paramedian  chaz.  *  Mild posterior fossa mass effect with mild effacement of 4th ventricle  without evidence of herniation, hemorrhage, or obstructive hydrocephalus.  *  Small foci of hypodensity in the left  thalamus, suspect small evolving  thalamoperforator infarct.    7/30/2022 CT head wo contrast  IMPRESSION:  1.  Multifocal supratentorial and infratentorial evolving subacute  infarcts, worse within the vertebrobasilar territory and similar in extent  and configuration to prior.  2.  Similar mass effect contributing to effacement of the fourth ventricle  without evidence for supratentorial hydrocephalus.  3.  Unchanged petechial hemorrhage associated with infarcts in the  cerebellar hemispheres and left occipital lobe.    7/30/2022 CXR  IMPRESSION:  1.  No acute cardiopulmonary findings.   2.  Mild cardiac enlargement.  3.  Gaseous colonic distention, incompletely visualized and evaluated    7/31/2022 CT Head wo contrast  IMPRESSION:  1.  Evolving infarcts within the bilateral cerebellar hemispheres and chaz  with similar degree of posterior fossa mass effect including mild right  cerebellar tonsillar ectopia and fourth ventricular effacement. No evidence  for interval supratentorial hydrocephalus.  2.  Multifocal small supratentorial evolving infarcts without significant  mass effect or midline shift.  3.  Unchanged mild petechial hemorrhage associated with infarcts in the  cerebellum and left occipital lobe. No new or enlarging hemorrhage.    8/4/2022 CT Head   IMPRESSION:    1.  Evolving infarcts in the bilateral cerebral hemispheres and chaz with a  similar degree of cerebellar tonsillar ectopia and effacement of the fourth  ventricle. There is mildly increased prominence of the supratentorial  ventricular system raising concern for early hydrocephalus.   2.  Multifocal smaller supratentorial evolving infarcts as described above,  similar to prior.  3.  Unchanged mild petechial hemorrhage in the left occipital lobe and  cerebellum. No new or enlarging hemorrhage.    8/4/2022 CT Angio head and neck  IMPRESSION:  CTA neck:    1.  Patent extracranial vasculature including patent recanalized distal  right vertebral  artery. No evidence of acute extracranial vascular  occlusion or near-occlusion.  2.  Nonflow limiting atherosclerosis of the aortic arch, great vessel  origins and carotid bifurcations.  3.  Nasogastric tube curled within the pharynx.  CTA head:    1.  Patent intracranial vasculature including recanalization of the right  vertebral artery and basilar artery.  2.  Residual severe stenosis at the right PICA artery origin and mild  diffuse caliber attenuation of the basilar artery and bilateral superior  cerebellar arteries.   3.  No evidence of new intracranial large vessel occlusion.      8/5/2022 CT head wo contrast  IMPRESSION:  1.  Overall similar appearance of evolving infarcts within the posterior  fossa with associated fourth ventricular effacement, cerebellar tonsillar  herniation and leftward cerebellar vermis midline shift. Stable  supratentorial ventricular caliber.  2.  Multifocal small supratentorial evolving infarct without significant  mass effect or midline shift.  3.  Unchanged mild petechial hemorrhage associated with infarcts in the  cerebellum and left occipital lobe. No new or enlarging hemorrhage.    8/6/2022 CT Head wo contrast  IMPRESSION:  1.  Redemonstrated extensive cerebellar infarction and edema with tonsillar  ectopia and effacement of the fourth ventricle. The lateral and third  ventricles remain stable in size.  2.  Evolving infarcts also noted in the left occipital lobe as well as  possibly the right occipital lobe, chaz and thalami.    8/12/2022 CT head wo contrast  IMPRESSION:  1.  Continued evolution of infarcts involving the cerebellum and left  occipital lobe with development of petechial hemorrhage. There remains mild  mass effect with improved effacement of the fourth ventricle. No  herniation.  2.  Additional possible evolving infarcts involving the thalami are again  seen.     8/13/2022 CT Head wo contrast  IMPRESSION:    1.  Evolving infarcts in the cerebellum and left  occipital lobe with  slightly increased internal blood products. Mild local mass effect is  stable.  2.  Punctate evolving infarcts in the bilateral thalami, slightly more  conspicuous on the right.    8/15/2022 CT Head wo contrast  IMPRESSION:  Stable evolving infarcts in the cerebellum, chaz, thalami and left  occipital lobe, with associated hemorrhage in the cerebellum and left  occipital lobe.    8/25/2022 CT Head wo contrast  IMPRESSION:    1.  Evolving infarcts in the cerebellum and left occipital lobe with  slightly decreased edema and decreased internal blood products. No  significant mass effect.  2.  Slightly progressive hypodensity in the left frontal white matter,  likely also representing a small evolving infarct. No mass effect or  hemorrhage.  3.  Punctate infarcts in the bilateral thalami are unchanged.    8/25/2022 XR Teeth  FINDINGS / IMPRESSION:  1.  Multiple mandibular dental extractions, crown erosions and retained  root tip fragments.  Diffuse periodontal disease.  2.  Decoronation and periapical abscess involving #24.   3.  Maxillary edentulous.    Microbiology   Rapid SARS-CoV-2 by PCR [[id number]] Collected: 08/15/22 1135   Order Status: Completed Specimen: Swab from Nasal, Mid-turbinate Updated: 08/15/22 1256    Rapid SARS-COV-2 by PCR Not Detected       ASSESSMENT AND PLAN:   Werner Osuna is a 65 year old male who was admitted on 7/27/2022    Poor dentition  - dental consultation, Dr. Becker recommend full edentulation with alveoloplasty under general anesthesia     Chronic anemia  Blood loss anemia?  - previously on the iron supplement  - GI consultation, plan for colonoscopy Tuesday with prep for Monday with IR guided NGT placement as long as hemoglobin is stable over the weekend  - repeat CBC at 1pm to assure stability again today    Bilateral cerebellar hemipshere, joy and bilateral occipital lobe ischemic strokes  Basilar artery occlusion, bilateral PCA occlusion - s/p  Herpes    HIV (human immunodeficiency virus infection) thromectomy 7/27/2022  Cytotoxic edema causing effacement of the 4th ventricle  Brain compression, obstructive hydrocephalus  Right sided hemiplegia  Severe Dysarthria  - PT/OT  - Speech therapy  - transferred out of the neuro ICU 8/11/2022  - IPR re-evaluation not a candidate, plan for CHELSEY placement  - Neurosurgery was following for the obstructive changes on imaging, Dr. Bird, no specific surgical intervention at this time, signed off 8/7/2022  - basa daily - held 8/26/2022  - Heparin subq 5000 units q8 hours - held 8/26/2022  - PTA Eliquis has been held, neurology will repeat head CT 8/31/2022 and if stable will consider resumption of AC    Dysphagia  - GI on consult, improved swallowing and ability to eat, no need for PEG at this time, allow previous PEG site that patient pulled out to heal  - Nutritional services on consult  - speech therapy on consult    Hypernatremia   - recheck BMP/CMP 8/29/2022, might not be hydrating well enough?? We shall see, PEG will still be a consideration if he starts to demonstrates inability to maintain baseline nutrition with oral intake    Diabetic foot ulcer  Shearing of the buttock  - wound care following, recommends: 8/18/2022  Iodoflex and wound gel to the left plantar foot wound, cover with gauze and kerlix wrap, change 3x weekly and PRN  Nutrashield to dorsal toes   Nutrashield to buttocks TID and PRN    NICM (nonischemic cardiomyopathy) (CMS/HCC)  7/28/2022 ECHO:  Normal left ventricular systolic function. LVEF 54%.  Hyperdynamic LV apical walls with small apical pouch visualized with Definity contrast.  Mildly increased RV size with mildly reduced systolic function.  Right ventricular systolic pressure; 43 mmHg (estimated RAP 10).  Moderate tricuspid valve regurgitation.  Dilated sinuses of Valsalva (42 mm) and ascending aorta (40 mm).  No pericardial effusion.  No recent echocardiogram available for comparison. LVEF has improved compared to study from 2019 (30% to  54%).  =================================================================================  - follows with EP as an outpatient  - holding the lasix 20mg daily   - continues the metopolol 100mg BID  - Lisinopril 40mg daily  - amlodipine 5mg daily   - prn hydralazine and labetalol IV are accessible     Rheumatoid arthritis involving multiple sites, unspecified whether rheumatoid factor present (CMS/HCC)  - does not use medication to control  - previously followed by Rheumatology but no longer     Chronic systolic (congestive) heart failure (CMS/HCC)  7/28/2022 ECHO:  Normal left ventricular systolic function. LVEF 54%.  Hyperdynamic LV apical walls with small apical pouch visualized with Definity contrast.  Mildly increased RV size with mildly reduced systolic function.  Right ventricular systolic pressure; 43 mmHg (estimated RAP 10).  Moderate tricuspid valve regurgitation.  Dilated sinuses of Valsalva (42 mm) and ascending aorta (40 mm).  No pericardial effusion.  No recent echocardiogram available for comparison. LVEF has improved compared to study from 2019 (30% to 54%).  =================================================================================  - continues the metopolol 100mg BID  - continues the atorvastatin 40mg daily  - continues the lisinopril 40mg daily  - holding the lasix 20mg daily     Alcohol dependence with unspecified alcohol-induced disorder (CMS/HCC)  States no longer drinks since the major trauma in December 2021      Persistent atrial fibrillation (CMS/HCC)  - follows with EP as an outpatient  - monitor on tele  - continues the metopolol 100mg BID  - cont digoxin 250mcg daily  - holding the eliquis, neurology will repeat head CT 8/31/2022 and if stable will consider resumption of AC     Primary hypertension  Continues the lisinopril 40mg daily  - continues the metopolol 100mg BID  - Holding the lasix 20mg daily  - amlodipine 5mg daily 8/13  - hydralazine and labetalol IV prn as well      Patient Care Team:  Fransisco Bejarano DO as PCP - General (Family Practice)  Aliza Carlton, RN as Care Transitions Nurse (Registered Nurse)    Dispo: transition to [facility] but first work up with GI for the anemia and dark stools and teeth extraction    Fransisco Bejarano DO    8/29/2022

## 2022-09-27 ENCOUNTER — APPOINTMENT (OUTPATIENT)
Dept: OPHTHALMOLOGY | Facility: CLINIC | Age: 42
End: 2022-09-27

## 2022-09-27 ENCOUNTER — OUTPATIENT (OUTPATIENT)
Dept: OUTPATIENT SERVICES | Facility: HOSPITAL | Age: 42
LOS: 1 days | Discharge: HOME | End: 2022-09-27

## 2022-09-27 PROCEDURE — 92014 COMPRE OPH EXAM EST PT 1/>: CPT

## 2022-09-28 ENCOUNTER — NON-APPOINTMENT (OUTPATIENT)
Age: 42
End: 2022-09-28

## 2022-09-30 DIAGNOSIS — H04.129 DRY EYE SYNDROME OF UNSPECIFIED LACRIMAL GLAND: ICD-10-CM

## 2022-09-30 DIAGNOSIS — H35.369 DRUSEN (DEGENERATIVE) OF MACULA, UNSPECIFIED EYE: ICD-10-CM

## 2022-09-30 DIAGNOSIS — E11.9 TYPE 2 DIABETES MELLITUS WITHOUT COMPLICATIONS: ICD-10-CM

## 2022-10-11 ENCOUNTER — OUTPATIENT (OUTPATIENT)
Dept: OUTPATIENT SERVICES | Facility: HOSPITAL | Age: 42
LOS: 1 days | Discharge: HOME | End: 2022-10-11

## 2022-10-11 ENCOUNTER — APPOINTMENT (OUTPATIENT)
Dept: INFECTIOUS DISEASE | Facility: CLINIC | Age: 42
End: 2022-10-11

## 2022-10-11 DIAGNOSIS — B20 HUMAN IMMUNODEFICIENCY VIRUS [HIV] DISEASE: ICD-10-CM

## 2022-10-11 PROCEDURE — ZZZZZ: CPT

## 2022-10-11 NOTE — REASON FOR VISIT
[Home] : at home, [unfilled] , at the time of the visit. [Medical Office: ([facility])___] : at the medical office located in  [Verbal consent obtained from patient] : the patient, [unfilled]

## 2022-10-11 NOTE — ASSESSMENT
[Treatment Education] : treatment education [FreeTextEntry1] : #AIDS, now well controlled\par - dx 2/2019 with PCP PNA and vaginal HSV outbreak- likely via sexual contact\par - at time of dx CD4 70; 5% HIV-1 RNA Quantitative, Viral Load: [id number] (02.12.19 @ 00:45)\par Toxo IgG neg, CMV IgG +, RPR neg\par - outpatient labs CD4 537;  2/2022\par - continue biktarvy\par - check labs \par \par #Hx HSV Herpes\par - Continue Valtrex 1g daily for prophylaxis\par \par #Transaminitis 4/2022, 5/2022\par - could be related to fatty liver\par - only new med januvia\par - trend, if continue to uptrend- RUQ US\par \par #DM A1c 8.2\par - Cont januvia- increased to 100mg\par - Follows with Endo- cannot get appt until 11/2022, will try to get earlier\par - patient to follow with podiatry and opthalmology appointments\par \par # HLD \par - Cont atorvastatin 40 qd started \par \par #HCM \par - need vaccine/ pneumonia records\par - HPV vaccine w/ GYN\par - COVID 2 x pfizer recommended booster \par - Flu UTD\par - 12/2021 COVID+\par - 4/6/22 pap neg, +BV, treated\par \par I have personally reviewed all the available charts, laboratory data, radiology and consultation notes\par Telephone encounter: 12 min spent counseling patient and discussing treatment plan  Body Location Override (Optional - Billing Will Still Be Based On Selected Body Map Location If Applicable): left dorsal hand Detail Level: Detailed Depth Of Biopsy: dermis Was A Bandage Applied: Yes Size Of Lesion In Cm: 0.5 X Size Of Lesion In Cm: 0 Biopsy Type: H and E Biopsy Method: Personna blade Anesthesia Type: 1% lidocaine with epinephrine Hemostasis: Electrocautery Wound Care: Bacitracin Dressing: bandage Destruction After The Procedure: No Type Of Destruction Used: Curettage Curettage Text: The wound bed was treated with curettage after the biopsy was performed. Cryotherapy Text: The wound bed was treated with cryotherapy after the biopsy was performed. Electrodesiccation Text: The wound bed was treated with electrodesiccation after the biopsy was performed. Electrodesiccation And Curettage Text: The wound bed was treated with electrodesiccation and curettage after the biopsy was performed. Silver Nitrate Text: The wound bed was treated with silver nitrate after the biopsy was performed. Lab: -691 Path Notes (To The Dermatopathologist): Size: 0.5cm R/O: BCC vs SCC Consent: Written consent was obtained and risks were reviewed including but not limited to scarring, infection, bleeding, scabbing, incomplete removal, nerve damage and allergy to anesthesia. Post-Care Instructions: I reviewed with the patient in detail post-care instructions. Patient is to keep the biopsy site dry overnight, and then apply bacitracin twice daily until healed. Patient may apply hydrogen peroxide soaks to remove any crusting. Notification Instructions: Patient will be notified of biopsy results. However, patient instructed to call the office if not contacted within 2 weeks. Billing Type: [employer] Information: Selecting Yes will display possible errors in your note based on the variables you have selected. This validation is only offered as a suggestion for you. PLEASE NOTE THAT THE VALIDATION TEXT WILL BE REMOVED WHEN YOU FINALIZE YOUR NOTE. IF YOU WANT TO FAX A PRELIMINARY NOTE YOU WILL NEED TO TOGGLE THIS TO 'NO' IF YOU DO NOT WANT IT IN YOUR FAXED NOTE. Body Location Override (Optional - Billing Will Still Be Based On Selected Body Map Location If Applicable): right mid forearm Billing Type: Third-Party Bill Body Location Override (Optional - Billing Will Still Be Based On Selected Body Map Location If Applicable): right frontal parietal scalp

## 2022-10-12 ENCOUNTER — OUTPATIENT (OUTPATIENT)
Dept: OUTPATIENT SERVICES | Facility: HOSPITAL | Age: 42
LOS: 1 days | Discharge: HOME | End: 2022-10-12

## 2022-10-12 ENCOUNTER — NON-APPOINTMENT (OUTPATIENT)
Age: 42
End: 2022-10-12

## 2022-10-12 ENCOUNTER — APPOINTMENT (OUTPATIENT)
Dept: NUTRITION | Facility: CLINIC | Age: 42
End: 2022-10-12

## 2022-10-12 ENCOUNTER — APPOINTMENT (OUTPATIENT)
Dept: ENDOCRINOLOGY | Facility: CLINIC | Age: 42
End: 2022-10-12

## 2022-10-12 VITALS
WEIGHT: 228 LBS | HEART RATE: 66 BPM | DIASTOLIC BLOOD PRESSURE: 83 MMHG | BODY MASS INDEX: 35.71 KG/M2 | SYSTOLIC BLOOD PRESSURE: 123 MMHG

## 2022-10-12 RX ORDER — BLOOD SUGAR DIAGNOSTIC
STRIP MISCELLANEOUS 3 TIMES DAILY
Qty: 1 | Refills: 2 | Status: ACTIVE | COMMUNITY
Start: 2022-10-12 | End: 1900-01-01

## 2022-10-12 RX ORDER — LANCETS 28 GAUGE
EACH MISCELLANEOUS
Qty: 100 | Refills: 2 | Status: ACTIVE | COMMUNITY
Start: 2022-10-12 | End: 1900-01-01

## 2022-10-12 RX ORDER — BLOOD-GLUCOSE METER
W/DEVICE KIT MISCELLANEOUS
Qty: 1 | Refills: 0 | Status: ACTIVE | COMMUNITY
Start: 2022-10-12 | End: 1900-01-01

## 2022-10-12 NOTE — HISTORY OF PRESENT ILLNESS
[FreeTextEntry1] : 42 yo F with PMHx HIV, DM, PCOS, obesity presented to clinic for DM referral. Does not report any new complaints. PATIENT IS ON JASON CONTRACEPTION, and has amenorrhea, she says she gained weight with anti HIV drugs.

## 2022-10-12 NOTE — ASSESSMENT
[FreeTextEntry1] : 40 yo F with PMHx HIV, DM, PCOS, obesity presented to clinic for DM referral.\par \par #DM\par - last a1c 05/22 - 8.2%\par - takes januvia 100mg daily\par - will add ozempic 0.25 TO 1 MG. WEEKLY

## 2022-10-19 DIAGNOSIS — E11.65 TYPE 2 DIABETES MELLITUS WITH HYPERGLYCEMIA: ICD-10-CM

## 2022-11-03 RX ORDER — SITAGLIPTIN 100 MG/1
100 TABLET, FILM COATED ORAL DAILY
Qty: 30 | Refills: 1 | Status: DISCONTINUED | COMMUNITY
Start: 2022-04-11 | End: 2022-11-03

## 2022-11-08 ENCOUNTER — APPOINTMENT (OUTPATIENT)
Dept: INFECTIOUS DISEASE | Facility: CLINIC | Age: 42
End: 2022-11-08

## 2022-11-28 ENCOUNTER — NON-APPOINTMENT (OUTPATIENT)
Age: 42
End: 2022-11-28

## 2022-11-28 ENCOUNTER — APPOINTMENT (OUTPATIENT)
Dept: NUTRITION | Facility: CLINIC | Age: 42
End: 2022-11-28

## 2022-11-28 ENCOUNTER — OUTPATIENT (OUTPATIENT)
Dept: OUTPATIENT SERVICES | Facility: HOSPITAL | Age: 42
LOS: 1 days | Discharge: HOME | End: 2022-11-28

## 2022-12-01 ENCOUNTER — EMERGENCY (EMERGENCY)
Facility: HOSPITAL | Age: 42
LOS: 0 days | Discharge: HOME | End: 2022-12-01
Attending: STUDENT IN AN ORGANIZED HEALTH CARE EDUCATION/TRAINING PROGRAM | Admitting: STUDENT IN AN ORGANIZED HEALTH CARE EDUCATION/TRAINING PROGRAM

## 2022-12-01 VITALS
WEIGHT: 225.09 LBS | OXYGEN SATURATION: 97 % | SYSTOLIC BLOOD PRESSURE: 129 MMHG | DIASTOLIC BLOOD PRESSURE: 59 MMHG | TEMPERATURE: 100 F | RESPIRATION RATE: 16 BRPM | HEART RATE: 102 BPM

## 2022-12-01 DIAGNOSIS — M79.10 MYALGIA, UNSPECIFIED SITE: ICD-10-CM

## 2022-12-01 DIAGNOSIS — Z21 ASYMPTOMATIC HUMAN IMMUNODEFICIENCY VIRUS [HIV] INFECTION STATUS: ICD-10-CM

## 2022-12-01 DIAGNOSIS — B34.9 VIRAL INFECTION, UNSPECIFIED: ICD-10-CM

## 2022-12-01 DIAGNOSIS — J02.9 ACUTE PHARYNGITIS, UNSPECIFIED: ICD-10-CM

## 2022-12-01 DIAGNOSIS — Z20.822 CONTACT WITH AND (SUSPECTED) EXPOSURE TO COVID-19: ICD-10-CM

## 2022-12-01 DIAGNOSIS — Z20.828 CONTACT WITH AND (SUSPECTED) EXPOSURE TO OTHER VIRAL COMMUNICABLE DISEASES: ICD-10-CM

## 2022-12-01 DIAGNOSIS — E11.9 TYPE 2 DIABETES MELLITUS WITHOUT COMPLICATIONS: ICD-10-CM

## 2022-12-01 DIAGNOSIS — R05.1 ACUTE COUGH: ICD-10-CM

## 2022-12-01 DIAGNOSIS — Z79.84 LONG TERM (CURRENT) USE OF ORAL HYPOGLYCEMIC DRUGS: ICD-10-CM

## 2022-12-01 LAB
FLUAV AG NPH QL: DETECTED
FLUBV AG NPH QL: SIGNIFICANT CHANGE UP
RSV RNA NPH QL NAA+NON-PROBE: SIGNIFICANT CHANGE UP
SARS-COV-2 RNA SPEC QL NAA+PROBE: SIGNIFICANT CHANGE UP

## 2022-12-01 PROCEDURE — 71045 X-RAY EXAM CHEST 1 VIEW: CPT | Mod: 26

## 2022-12-01 PROCEDURE — 99284 EMERGENCY DEPT VISIT MOD MDM: CPT

## 2022-12-01 RX ORDER — ONDANSETRON 8 MG/1
4 TABLET, FILM COATED ORAL ONCE
Refills: 0 | Status: COMPLETED | OUTPATIENT
Start: 2022-12-01 | End: 2022-12-01

## 2022-12-01 RX ORDER — DEXAMETHASONE 0.5 MG/5ML
10 ELIXIR ORAL ONCE
Refills: 0 | Status: COMPLETED | OUTPATIENT
Start: 2022-12-01 | End: 2022-12-01

## 2022-12-01 RX ORDER — IBUPROFEN 200 MG
600 TABLET ORAL ONCE
Refills: 0 | Status: COMPLETED | OUTPATIENT
Start: 2022-12-01 | End: 2022-12-01

## 2022-12-01 RX ADMIN — Medication 600 MILLIGRAM(S): at 14:34

## 2022-12-01 RX ADMIN — Medication 600 MILLIGRAM(S): at 15:54

## 2022-12-01 RX ADMIN — Medication 10 MILLIGRAM(S): at 14:39

## 2022-12-01 RX ADMIN — ONDANSETRON 4 MILLIGRAM(S): 8 TABLET, FILM COATED ORAL at 14:34

## 2022-12-01 NOTE — ED ADULT NURSE NOTE - NSIMPLEMENTINTERV_GEN_ALL_ED
Implemented All Universal Safety Interventions:  [place] to call system. Call bell, personal items and telephone within reach. Instruct patient to call for assistance. Room bathroom lighting operational. Non-slip footwear when patient is off stretcher. Physically safe environment: no spills, clutter or unnecessary equipment. Stretcher in lowest position, wheels locked, appropriate side rails in place.

## 2022-12-01 NOTE — ED PROVIDER NOTE - PATIENT PORTAL LINK FT
You can access the FollowMyHealth Patient Portal offered by [facility] by registering at the following website: [email or web address]. By joining [facility]’s FollowMyHealth portal, you will also be able to view your health information using other applications (apps) compatible with our system.

## 2022-12-01 NOTE — ED PROVIDER NOTE - NS ED ROS FT
GEN:  +fever, +chills, no generalized weakness  NEURO:  +headache, no dizziness  ENT: +sore throat, no runny nose  CV:  no chest pain, no palpitations  RESP:  no sob, +cough  GI:  +nausea, no vomiting, no abdominal pain, no diarrhea  :  no dysuria, no urinary frequency, no hematuria  MSK:  no joint pain, no edema  SKIN:  no rash, no bruising

## 2022-12-01 NOTE — ED PROVIDER NOTE - OBJECTIVE STATEMENT
42 yo female, PMHx of HIV and DM, presents with myalgias, cough, and sore throat x4 days, not alleviated by TheraFlu or DayQuil, no aggravating factors, associated with mild nausea and chills but is able to adequately tolerate PO. Denies vomiting, abdominal pain, diarrhea, chest pain, shortness of breath. Of note, her daughter recently tested Flu A+.

## 2022-12-01 NOTE — ED PROVIDER NOTE - CARE PLAN
Principal Discharge DX:	Viral syndrome   1 Principal Discharge DX:	Viral syndrome  Assessment and plan of treatment:	viral syndrome  flu/covid, cxr, antipyretics  day 4 of symptoms.

## 2022-12-01 NOTE — ED PROVIDER NOTE - CLINICAL SUMMARY MEDICAL DECISION MAKING FREE TEXT BOX
Patient with 4 days of URI symptoms, contact with +flu. well appearing on exam. Vitals reviewed. Flu sent, cxr reviewed, no focal pneumonia. Out of window for tamiflu. Stable for discharge.

## 2022-12-01 NOTE — ED PROVIDER NOTE - PHYSICAL EXAMINATION
CONSTITUTIONAL: Well-developed; well-nourished; in no acute distress.   SKIN: warm, dry  HEAD: Normocephalic  EYES: no conjunctival injection  ENT: No nasal discharge; airway clear.  NECK: Supple; non tender.  CARD: S1, S2 normal; nRegular rate and rhythm.   RESP: No wheezes, rales or rhonchi.  ABD: soft ntnd  EXT: Normal ROM.  No clubbing, cyanosis or edema.   NEURO: Alert, oriented, grossly unremarkable.  PSYCH: Cooperative, appropriate.

## 2022-12-01 NOTE — ED PROVIDER NOTE - ATTENDING CONTRIBUTION TO CARE
42 yo F w/ hx of DM, HIV undetectable viral load p/w flu-like symptoms including cough, rhinorrhea, sore throat, body aches x 4 days, fever x 2 days, daughter with influenza.     vss, nontoxic, well appearing, pink conj, anicteric, MMM- erythematous posterior oropharynx, no exudates, neck supple, CTAB, RRR, equal radial pulses bilat, abd soft/nt/nd, no cva tend. no calves tend, no edema, no fnd. no rashes.

## 2022-12-02 ENCOUNTER — NON-APPOINTMENT (OUTPATIENT)
Age: 42
End: 2022-12-02

## 2022-12-06 ENCOUNTER — APPOINTMENT (OUTPATIENT)
Dept: PODIATRY | Facility: CLINIC | Age: 42
End: 2022-12-06

## 2022-12-06 ENCOUNTER — OUTPATIENT (OUTPATIENT)
Dept: OUTPATIENT SERVICES | Facility: HOSPITAL | Age: 42
LOS: 1 days | Discharge: HOME | End: 2022-12-06

## 2022-12-06 VITALS
SYSTOLIC BLOOD PRESSURE: 101 MMHG | WEIGHT: 230 LBS | BODY MASS INDEX: 36.1 KG/M2 | HEIGHT: 67 IN | TEMPERATURE: 98.7 F | OXYGEN SATURATION: 98 % | HEART RATE: 54 BPM | DIASTOLIC BLOOD PRESSURE: 69 MMHG

## 2022-12-06 PROCEDURE — 99203 OFFICE O/P NEW LOW 30 MIN: CPT | Mod: GC

## 2022-12-06 RX ORDER — VALACYCLOVIR 1 G/1
1 TABLET, FILM COATED ORAL
Qty: 90 | Refills: 3 | Status: DISCONTINUED | COMMUNITY
Start: 2022-04-26 | End: 2022-12-06

## 2022-12-06 NOTE — HISTORY OF PRESENT ILLNESS
[FreeTextEntry1] : 41 F presents for diabetic foot check up states she has been diabetic for one year and was referred here by her nutritionist. A1c 8.2% 5/2022

## 2022-12-06 NOTE — PHYSICAL EXAM
[2+] : left foot dorsalis pedis 2+ [Normal Foot/Ankle] : Both lower extremities were exposed and visualized. Standing exam demonstrates normal foot posture and alignment. Hindfoot exam shows no hindfoot valgus or varus [Skin Color & Pigmentation] : normal skin color and pigmentation [Sensation] : the sensory exam was normal to light touch and pinprick [General Appearance - Alert] : alert [General Appearance - In No Acute Distress] : in no acute distress [Oriented To Time, Place, And Person] : oriented to person, place, and time [Impaired Insight] : insight and judgment were intact [Vibration Dec.] : normal vibratory sensation at the level of the toes [Diminished Throughout Right Foot] : normal sensation with monofilament testing throughout right foot [Diminished Throughout Left Foot] : normal sensation with monofilament testing throughout left foot

## 2022-12-06 NOTE — END OF VISIT
[] : Resident [FreeTextEntry3] : Modified ADA Diabetic Foot Risk Classification\par A1c reviewed \par -Loss of protective sensation: no\par -Presence of foot deformity:    no \par -presence of pre-ulcerative lesion:  no\par   -hemorrhage into callus:  no\par -atrophy of heel or metatarsal fat pads:  no\par \par -Diabetic neurovascular foot assessment  performed. \par -Discussed with patient diabetic foot hygiene.Patient instructed to regularly check the bottom of the feet\par -Patient given a diabetic foot education sheet\par -Return 6 month\par \par My notes supersedes the above resident documentation in case of discrepancy. Correction for their notes is in my notes. \par \par \par

## 2023-01-17 ENCOUNTER — OUTPATIENT (OUTPATIENT)
Dept: OUTPATIENT SERVICES | Facility: HOSPITAL | Age: 43
LOS: 1 days | Discharge: HOME | End: 2023-01-17

## 2023-01-17 ENCOUNTER — APPOINTMENT (OUTPATIENT)
Dept: INFECTIOUS DISEASE | Facility: CLINIC | Age: 43
End: 2023-01-17
Payer: MEDICAID

## 2023-01-17 VITALS
DIASTOLIC BLOOD PRESSURE: 73 MMHG | WEIGHT: 228 LBS | SYSTOLIC BLOOD PRESSURE: 114 MMHG | TEMPERATURE: 97.8 F | HEIGHT: 67 IN | OXYGEN SATURATION: 100 % | RESPIRATION RATE: 14 BRPM | HEART RATE: 62 BPM | BODY MASS INDEX: 35.79 KG/M2

## 2023-01-17 DIAGNOSIS — B20 HUMAN IMMUNODEFICIENCY VIRUS [HIV] DISEASE: ICD-10-CM

## 2023-01-17 DIAGNOSIS — R74.01 ELEVATION OF LEVELS OF LIVER TRANSAMINASE LEVELS: ICD-10-CM

## 2023-01-17 PROCEDURE — 99214 OFFICE O/P EST MOD 30 MIN: CPT

## 2023-01-17 RX ORDER — PSYLLIUM SEED
28.3 PACKET (EA) ORAL
Qty: 1 | Refills: 0 | Status: ACTIVE | COMMUNITY
Start: 2023-01-17 | End: 1900-01-01

## 2023-01-17 NOTE — ASSESSMENT
[HIV Education] : HIV Education [FreeTextEntry1] : #AIDS, now well controlled\par - dx 2/2019 with PCP PNA and vaginal HSV outbreak- likely via sexual contact\par - at time of dx CD4 70; 5% HIV-1 RNA Quantitative, Viral Load: [id number] (02.12.19 @ 00:45)\par Toxo IgG neg, CMV IgG +, RPR neg\par - outpatient labs CD4 537;  2/2022\par - continue biktarvy\par - VL 20 \par \par #Hx HSV Herpes\par - Continue Valtrex 1g daily for prophylaxis\par \par #Transaminitis 4/2022, 5/2022\par - could be related to fatty liver\par - only new med januvia\par - social ETOH\par - trend, if continue to uptrend- RUQ US\par \par #DM A1c 8.2\par - Cont januvia  100mg\par - Saw Endo- has f/u \par - Had SE with ozempic \par - Had SE with metformin\par - patient to follow with podiatry and opthalmology appointments\par \par # HLD \par - Cont atorvastatin 40 qd started \par \par #HCM \par - need vaccine/ pneumonia records\par - HPV vaccine w/ GYN\par - COVID 2 x pfizer recommended booster \par - Flu UTD\par - PCV20\par - 12/2021 COVID+\par - 4/6/22 pap neg, +BV, treated\par \par I have personally reviewed all the available charts, laboratory data, radiology and consultation notes\par 31 min spent counseling patients [Treatment Education] : treatment education

## 2023-01-20 LAB
ALBUMIN SERPL ELPH-MCNC: 4.3 G/DL
ALP BLD-CCNC: 143 U/L
ALT SERPL-CCNC: 34 U/L
ANION GAP SERPL CALC-SCNC: 10 MMOL/L
AST SERPL-CCNC: 22 U/L
BASOPHILS # BLD AUTO: 0.03 K/UL
BASOPHILS NFR BLD AUTO: 0.4 %
BILIRUB SERPL-MCNC: 0.5 MG/DL
BUN SERPL-MCNC: 15 MG/DL
CALCIUM SERPL-MCNC: 9.6 MG/DL
CD16+CD56+ CELLS # BLD: 255 /UL
CD16+CD56+ CELLS NFR BLD: 11 %
CD19 CELLS NFR BLD: 524 /UL
CD3 CELLS # BLD: 1583 /UL
CD3 CELLS NFR BLD: 67 %
CD3+CD4+ CELLS # BLD: 725 /UL
CD3+CD4+ CELLS NFR BLD: 30 %
CD3+CD4+ CELLS/CD3+CD8+ CLL SPEC: 0.85 RATIO
CD3+CD8+ CELLS # SPEC: 854 /UL
CD3+CD8+ CELLS NFR BLD: 36 %
CELLS.CD3-CD19+/CELLS IN BLOOD: 22 %
CHLORIDE SERPL-SCNC: 103 MMOL/L
CHOLEST SERPL-MCNC: 96 MG/DL
CO2 SERPL-SCNC: 23 MMOL/L
CREAT SERPL-MCNC: 0.9 MG/DL
CREAT SPEC-SCNC: 44 MG/DL
CREAT/PROT UR: <0.1 RATIO
EGFR: 81 ML/MIN/1.73M2
EOSINOPHIL # BLD AUTO: 0.12 K/UL
EOSINOPHIL NFR BLD AUTO: 1.6 %
ESTIMATED AVERAGE GLUCOSE: 217 MG/DL
GGT SERPL-CCNC: 83 U/L
GLUCOSE SERPL-MCNC: 252 MG/DL
HBA1C MFR BLD HPLC: 9.2 %
HCT VFR BLD CALC: 45 %
HDLC SERPL-MCNC: 38 MG/DL
HGB BLD-MCNC: 14.9 G/DL
HIV1 RNA # SERPL NAA+PROBE: <20 COPIES/ML
IMM GRANULOCYTES NFR BLD AUTO: 0.4 %
LDLC SERPL CALC-MCNC: 28 MG/DL
LYMPHOCYTES # BLD AUTO: 2.07 K/UL
LYMPHOCYTES NFR BLD AUTO: 28.4 %
MAN DIFF?: NORMAL
MCHC RBC-ENTMCNC: 32.7 PG
MCHC RBC-ENTMCNC: 33.1 G/DL
MCV RBC AUTO: 98.9 FL
MONOCYTES # BLD AUTO: 0.58 K/UL
MONOCYTES NFR BLD AUTO: 7.9 %
NEUTROPHILS # BLD AUTO: 4.47 K/UL
NEUTROPHILS NFR BLD AUTO: 61.3 %
NONHDLC SERPL-MCNC: 58 MG/DL
PLATELET # BLD AUTO: 308 K/UL
POTASSIUM SERPL-SCNC: 3.9 MMOL/L
PROT SERPL-MCNC: 7.5 G/DL
PROT UR-MCNC: <5 MG/DLG/24H
RBC # BLD: 4.55 M/UL
RBC # FLD: 12.8 %
SODIUM SERPL-SCNC: 136 MMOL/L
TRIGL SERPL-MCNC: 152 MG/DL
VIRAL LOAD INTERP: DETECTED COPIES/ML
VIRAL LOAD LOG: <1.3 LOGCOPIES/ML
WBC # FLD AUTO: 7.3 K/UL

## 2023-01-27 ENCOUNTER — OUTPATIENT (OUTPATIENT)
Dept: OUTPATIENT SERVICES | Facility: HOSPITAL | Age: 43
LOS: 1 days | Discharge: HOME | End: 2023-01-27

## 2023-01-27 ENCOUNTER — APPOINTMENT (OUTPATIENT)
Dept: NUTRITION | Facility: CLINIC | Age: 43
End: 2023-01-27

## 2023-01-27 RX ORDER — SEMAGLUTIDE 1.34 MG/ML
2 INJECTION, SOLUTION SUBCUTANEOUS
Qty: 1 | Refills: 5 | Status: DISCONTINUED | COMMUNITY
Start: 2022-10-12 | End: 2023-01-27

## 2023-02-03 ENCOUNTER — NON-APPOINTMENT (OUTPATIENT)
Age: 43
End: 2023-02-03

## 2023-02-08 ENCOUNTER — OUTPATIENT (OUTPATIENT)
Dept: OUTPATIENT SERVICES | Facility: HOSPITAL | Age: 43
LOS: 1 days | End: 2023-02-08
Payer: MEDICAID

## 2023-02-08 ENCOUNTER — APPOINTMENT (OUTPATIENT)
Dept: ENDOCRINOLOGY | Facility: CLINIC | Age: 43
End: 2023-02-08

## 2023-02-08 VITALS
SYSTOLIC BLOOD PRESSURE: 130 MMHG | HEIGHT: 67 IN | HEART RATE: 66 BPM | DIASTOLIC BLOOD PRESSURE: 84 MMHG | WEIGHT: 225 LBS | BODY MASS INDEX: 35.31 KG/M2

## 2023-02-08 DIAGNOSIS — Z00.00 ENCOUNTER FOR GENERAL ADULT MEDICAL EXAMINATION WITHOUT ABNORMAL FINDINGS: ICD-10-CM

## 2023-02-08 PROCEDURE — 99214 OFFICE O/P EST MOD 30 MIN: CPT

## 2023-02-08 NOTE — ASSESSMENT
[FreeTextEntry1] : 42 yo F with PMHx HIV, DM, PCOS, obesity presented to clinic for DM follow up\par \par #DM type II - uncontrolled \par Patient did not tolerate metformin and ozempic. Januvia was ineffective.\par - last a1c 05/22 - 9.2% in Jan 2022\par - cw pioglitazone. Start trulicity \par - regularly follows with podiatry and opht \par - f/u in 3m\par \par \par #obesity\par - counselled on weight loss and exercise\par \par #DL\par - well controlled with atorvastatin 40\par \par #HIV: follows with Dr Cota\par #PCOS and contraception: follows with [facility]

## 2023-02-08 NOTE — HISTORY OF PRESENT ILLNESS
[FreeTextEntry1] : 41 yo F with PMHx HIV, DM type II, PCOS, DL, obesity, presented to clinic for DM follow up. Does not report any new complaints. Patient is on Mirena for contraception and has amenorrhea.\par \par chronically elevated ALP, no abdominal pain. no other complaints. \par Jan 2023 A1c 9.2 . lipid levels normal.\par \par : [id number]

## 2023-02-09 ENCOUNTER — NON-APPOINTMENT (OUTPATIENT)
Age: 43
End: 2023-02-09

## 2023-02-10 ENCOUNTER — NON-APPOINTMENT (OUTPATIENT)
Age: 43
End: 2023-02-10

## 2023-02-13 DIAGNOSIS — E66.9 OBESITY, UNSPECIFIED: ICD-10-CM

## 2023-02-13 DIAGNOSIS — E11.65 TYPE 2 DIABETES MELLITUS WITH HYPERGLYCEMIA: ICD-10-CM

## 2023-02-24 ENCOUNTER — OUTPATIENT (OUTPATIENT)
Dept: OUTPATIENT SERVICES | Facility: HOSPITAL | Age: 43
LOS: 1 days | End: 2023-02-24
Payer: MEDICAID

## 2023-02-24 ENCOUNTER — APPOINTMENT (OUTPATIENT)
Dept: OBGYN | Facility: CLINIC | Age: 43
End: 2023-02-24
Payer: MEDICAID

## 2023-02-24 ENCOUNTER — APPOINTMENT (OUTPATIENT)
Dept: OBGYN | Facility: CLINIC | Age: 43
End: 2023-02-24

## 2023-02-24 VITALS
SYSTOLIC BLOOD PRESSURE: 118 MMHG | DIASTOLIC BLOOD PRESSURE: 67 MMHG | HEIGHT: 67 IN | WEIGHT: 232 LBS | BODY MASS INDEX: 36.41 KG/M2

## 2023-02-24 DIAGNOSIS — Z30.42 ENCOUNTER FOR SURVEILLANCE OF INJECTABLE CONTRACEPTIVE: ICD-10-CM

## 2023-02-24 PROCEDURE — 99212 OFFICE O/P EST SF 10 MIN: CPT

## 2023-02-24 PROCEDURE — ZZZZZ: CPT

## 2023-02-24 PROCEDURE — 90471 IMMUNIZATION ADMIN: CPT

## 2023-02-24 PROCEDURE — 99213 OFFICE O/P EST LOW 20 MIN: CPT

## 2023-02-28 DIAGNOSIS — Z23 ENCOUNTER FOR IMMUNIZATION: ICD-10-CM

## 2023-03-02 ENCOUNTER — APPOINTMENT (OUTPATIENT)
Dept: INTERNAL MEDICINE | Facility: CLINIC | Age: 43
End: 2023-03-02
Payer: MEDICAID

## 2023-03-02 ENCOUNTER — APPOINTMENT (OUTPATIENT)
Dept: INTERNAL MEDICINE | Facility: CLINIC | Age: 43
End: 2023-03-02

## 2023-03-02 ENCOUNTER — OUTPATIENT (OUTPATIENT)
Dept: OUTPATIENT SERVICES | Facility: HOSPITAL | Age: 43
LOS: 1 days | End: 2023-03-02
Payer: MEDICAID

## 2023-03-02 VITALS
HEART RATE: 65 BPM | HEIGHT: 67 IN | SYSTOLIC BLOOD PRESSURE: 143 MMHG | OXYGEN SATURATION: 96 % | WEIGHT: 231.6 LBS | DIASTOLIC BLOOD PRESSURE: 69 MMHG | TEMPERATURE: 98.7 F | BODY MASS INDEX: 36.35 KG/M2

## 2023-03-02 DIAGNOSIS — Z00.00 ENCOUNTER FOR GENERAL ADULT MEDICAL EXAMINATION W/OUT ABNORMAL FINDINGS: ICD-10-CM

## 2023-03-02 DIAGNOSIS — Z00.00 ENCOUNTER FOR GENERAL ADULT MEDICAL EXAMINATION WITHOUT ABNORMAL FINDINGS: ICD-10-CM

## 2023-03-02 PROCEDURE — 99214 OFFICE O/P EST MOD 30 MIN: CPT

## 2023-03-02 PROCEDURE — 99214 OFFICE O/P EST MOD 30 MIN: CPT | Mod: GC

## 2023-03-02 NOTE — PHYSICAL EXAM
[No Acute Distress] : no acute distress [Well Nourished] : well nourished [Well Developed] : well developed [Well-Appearing] : well-appearing [Normal Sclera/Conjunctiva] : normal sclera/conjunctiva [PERRL] : pupils equal round and reactive to light [EOMI] : extraocular movements intact [Normal Outer Ear/Nose] : the outer ears and nose were normal in appearance [Normal Oropharynx] : the oropharynx was normal [No JVD] : no jugular venous distention [No Lymphadenopathy] : no lymphadenopathy [Supple] : supple [Thyroid Normal, No Nodules] : the thyroid was normal and there were no nodules present [No Respiratory Distress] : no respiratory distress  [No Accessory Muscle Use] : no accessory muscle use [Clear to Auscultation] : lungs were clear to auscultation bilaterally [Normal Rate] : normal rate  [Regular Rhythm] : with a regular rhythm [Normal S1, S2] : normal S1 and S2 [No Murmur] : no murmur heard [No Carotid Bruits] : no carotid bruits 1 person assist/pt required increased physical assistance to help perform transfer/to safely lower to a sitting position' verbal cues re proper sequence + proper hand placement in prep to perform transfer; verbal cues re reinforcement of posterior hip precautions/verbal cues [No Abdominal Bruit] : a ~M bruit was not heard ~T in the abdomen [No Varicosities] : no varicosities [Pedal Pulses Present] : the pedal pulses are present [No Edema] : there was no peripheral edema [No Palpable Aorta] : no palpable aorta [No Extremity Clubbing/Cyanosis] : no extremity clubbing/cyanosis [Soft] : abdomen soft [Non Tender] : non-tender [Non-distended] : non-distended [No Masses] : no abdominal mass palpated [No HSM] : no HSM [Normal Bowel Sounds] : normal bowel sounds [Normal Posterior Cervical Nodes] : no posterior cervical lymphadenopathy [Normal Anterior Cervical Nodes] : no anterior cervical lymphadenopathy [No CVA Tenderness] : no CVA  tenderness [No Spinal Tenderness] : no spinal tenderness [No Joint Swelling] : no joint swelling [Grossly Normal Strength/Tone] : grossly normal strength/tone [No Rash] : no rash [Coordination Grossly Intact] : coordination grossly intact [No Focal Deficits] : no focal deficits [Normal Gait] : normal gait [Deep Tendon Reflexes (DTR)] : deep tendon reflexes were 2+ and symmetric [Normal Affect] : the affect was normal [Normal Insight/Judgement] : insight and judgment were intact

## 2023-03-02 NOTE — HISTORY OF PRESENT ILLNESS
[de-identified] : \par Patient is a 42 year old female with HIV on Biktarvy, PCOS, Obesity, DM type 2  A1c 9.2% presenting for follow up. Patient was recently seen by endocrinology and was started on pioglitazone and trulicity. Daily FSG noting sugar 100-115. The patient is compliant with all medications. She is pending mammogram in May. Referred to podiatry and Ophthalmology. She has no acute complaints to follow up. \par \par \par

## 2023-03-02 NOTE — PLAN
[FreeTextEntry1] : Ms Rayshawn Aden is a 42 WHITNEY w a PMH of HIV, obesity and DM.\par \par #DM\par - A1c =8.4 > 9.2% \par - unable to tolerate metformin\par - Seen by endocrinology Feb 2023 > c/w pioglitazone and trulicity \par - FSG ~112 \par - podiatry and ophthalmology referral given \par \par #HIV\par - Pateint follows w Dr Cota\par - dx 2/2019 with PCP PNA and vaginal HSV outbreak- likely via sexual contact at time of dx CD4 70; 5% HIV-1 RNA Quantitative, Viral Load: [id number] (02.12.19 @ 00:45)\par - Toxo IgG neg, CMV IgG +, RPR neg\par - outpatient labs CD4 700, detected viral load \par - continue biktarvy\par \par # HLD \par - atorvastatin 40 qd started \par \par #Hx indeterminate quantiferon/PPD\par -PPD negative per Pt \par \par #Wellness\par - Pap- following with GYN\par - Mammogram- scheduled in May 2023\par - immunizations - to bring in records for next visit \par - recent COVID dx ; will wait 4mo for 3rd shot per ID \par rto in 6 months and prn. \par

## 2023-03-06 DIAGNOSIS — B20 HUMAN IMMUNODEFICIENCY VIRUS [HIV] DISEASE: ICD-10-CM

## 2023-03-06 DIAGNOSIS — Z00.00 ENCOUNTER FOR GENERAL ADULT MEDICAL EXAMINATION WITHOUT ABNORMAL FINDINGS: ICD-10-CM

## 2023-03-06 DIAGNOSIS — E78.5 HYPERLIPIDEMIA, UNSPECIFIED: ICD-10-CM

## 2023-03-06 DIAGNOSIS — E11.9 TYPE 2 DIABETES MELLITUS WITHOUT COMPLICATIONS: ICD-10-CM

## 2023-03-13 ENCOUNTER — APPOINTMENT (OUTPATIENT)
Dept: NUTRITION | Facility: CLINIC | Age: 43
End: 2023-03-13

## 2023-04-06 ENCOUNTER — APPOINTMENT (OUTPATIENT)
Dept: OPHTHALMOLOGY | Facility: CLINIC | Age: 43
End: 2023-04-06

## 2023-04-06 ENCOUNTER — APPOINTMENT (OUTPATIENT)
Dept: OPHTHALMOLOGY | Facility: CLINIC | Age: 43
End: 2023-04-06
Payer: MEDICAID

## 2023-04-06 ENCOUNTER — OUTPATIENT (OUTPATIENT)
Dept: OUTPATIENT SERVICES | Facility: HOSPITAL | Age: 43
LOS: 1 days | End: 2023-04-06
Payer: MEDICAID

## 2023-04-06 DIAGNOSIS — H53.8 OTHER VISUAL DISTURBANCES: ICD-10-CM

## 2023-04-06 PROCEDURE — 92012 INTRM OPH EXAM EST PATIENT: CPT

## 2023-04-07 ENCOUNTER — APPOINTMENT (OUTPATIENT)
Dept: NUTRITION | Facility: CLINIC | Age: 43
End: 2023-04-07

## 2023-04-07 ENCOUNTER — NON-APPOINTMENT (OUTPATIENT)
Age: 43
End: 2023-04-07

## 2023-04-07 ENCOUNTER — OUTPATIENT (OUTPATIENT)
Dept: OUTPATIENT SERVICES | Facility: HOSPITAL | Age: 43
LOS: 1 days | End: 2023-04-07
Payer: MEDICAID

## 2023-04-07 DIAGNOSIS — Z00.00 ENCOUNTER FOR GENERAL ADULT MEDICAL EXAMINATION WITHOUT ABNORMAL FINDINGS: ICD-10-CM

## 2023-04-07 PROCEDURE — G0108: CPT

## 2023-04-10 DIAGNOSIS — E11.65 TYPE 2 DIABETES MELLITUS WITH HYPERGLYCEMIA: ICD-10-CM

## 2023-04-14 ENCOUNTER — OUTPATIENT (OUTPATIENT)
Dept: OUTPATIENT SERVICES | Facility: HOSPITAL | Age: 43
LOS: 1 days | End: 2023-04-14
Payer: MEDICAID

## 2023-04-14 ENCOUNTER — APPOINTMENT (OUTPATIENT)
Dept: OBGYN | Facility: CLINIC | Age: 43
End: 2023-04-14
Payer: MEDICAID

## 2023-04-14 ENCOUNTER — RESULT CHARGE (OUTPATIENT)
Age: 43
End: 2023-04-14

## 2023-04-14 VITALS
BODY MASS INDEX: 35.79 KG/M2 | SYSTOLIC BLOOD PRESSURE: 102 MMHG | DIASTOLIC BLOOD PRESSURE: 80 MMHG | WEIGHT: 228 LBS | HEIGHT: 67 IN

## 2023-04-14 DIAGNOSIS — Z30.432 ENCOUNTER FOR REMOVAL OF INTRAUTERINE CONTRACEPTIVE DEVICE: ICD-10-CM

## 2023-04-14 DIAGNOSIS — H04.129 DRY EYE SYNDROME OF UNSPECIFIED LACRIMAL GLAND: ICD-10-CM

## 2023-04-14 DIAGNOSIS — H35.369 DRUSEN (DEGENERATIVE) OF MACULA, UNSPECIFIED EYE: ICD-10-CM

## 2023-04-14 DIAGNOSIS — E11.9 TYPE 2 DIABETES MELLITUS WITHOUT COMPLICATIONS: ICD-10-CM

## 2023-04-14 PROCEDURE — 81025 URINE PREGNANCY TEST: CPT

## 2023-04-14 PROCEDURE — 58301 REMOVE INTRAUTERINE DEVICE: CPT

## 2023-04-14 PROCEDURE — 58300 INSERT INTRAUTERINE DEVICE: CPT

## 2023-04-14 PROCEDURE — 99214 OFFICE O/P EST MOD 30 MIN: CPT

## 2023-04-14 NOTE — DISCHARGE NOTE ADULT - INFLUENZA IMMUNIZATION DATE (APPROXIMATE):
[facility]   PM&R clinic note        Interval history:     Nadira Perez presents to clinic today for follow up reg her rehab needs.   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  Recommendations included:  1. Work-up:  1. Labs- B6, B12, protein immunofixation/elecrophoresis  2. Repeat EMG  2. Therapy/equipment/braces:  1. Start physical therapy as planned  2. Orthotics referral for AFO for right foot drop  3. Medications:  1. Continue gabapentin at current dose.  4. Follow up: 3 months.    Oncology History:  -Diagnosis: 6/13/2007. 53 years old. Presented with left-sided breast discomfort which extended into the axilla. Clinical stage IIIC (T4d N3 M0), infiltrating ductal carcinoma, ER/IA positive, HER2-oneida negative. She had skin and areolar complex thickening consistent with inflammatory breast cancer.   -Neoadjuvant chemotherapy: She received FEC (Fluorouracil, Epirubicin, Cyclosphamide) for 3 cycles through 08/24/2007. This was followed by Taxotere for 3 cycles completed 10/26/2007. Estimated cumulative epirubicin dosing was 300 mg/m2 (900 mg/m2 is cumulative lifetime dose).   -Surgery: 11/20/2007 left-sided mastectomy with axillary lymph node dissection. 1 cm residual carcinoma with DCIS. 13/33 lymph nodes were positive, largest was 0.6 cm with extracapsular extension.   -Radiation: She received radiation to the left chest wall at a dose of 6440 cGy, to the left supraclavicular fossa at a dose of 5040 cGy, and to the left internal mammary chain at a dose of 5080 cGy.  Last dose of radiation was administered on 03/07/2008.       -Endocrine therapy: Arimidex 11//2007-11/2012, Tamoxifen 11/2012-11/2017, Arimidex 11/2017-11/2021. 14 years of endocrine therapy.   -Genetic testing: no  -Other information: s/p right sided prophylactic mastectomy 11/20/2007. Zometa every 6 months from 11/2017-11/2021 (4  years).   - Saw Ashley Perry  in Survivorship clinic on 11/15/22.  She fell and broke her 2nd right metatarsal, right fibula, and 4th finger, and thumb. She has chronic low back pain and had back surgery last year. She had a CT of her lumbar spine 5/2022 negative for metastatic disease. Peripheral neuropathy precedes cancer diagnosis, wanted referral to PM&R for evaluation of neuropathy. Seeing Neurology for dizziness, Brain MRI in 2020 negative, continues with lymphedema, wants a referral to PT.      Symptoms,  Ismael was seen for a return visit. She has been struggling with her left SI symptoms and is using a cane for shorter distances and the walker for longer distances.  She had her last avenity injection with Dr. Clark last week.   She continues with her lymphedema management for left arm. She has been doing her usually massage/stretch routine and continues wrapping per her own routine. She wraps two times per week and wraps at night but keeps it on during the day at times. She also has a sleeve which helps  She is using TENS, acupuncture for low back symptoms and legs.   She got her right AFO for the foot drop, and feels like it is helping her ambulation significantly.  She is still on gabapentin 1200 mg TID, and has been on this dose for a while.  She feels that it may be helping, she has not tried to wean down.      Therapies/HEP,  She continues with lymphedema therapy outpatient, and will be discharging soon.  She continues with her manual drainage techniques and daily stretches.      Functionally,   No changes in function.      Social history is unchanged.          Medications:  Current Outpatient Medications   Medication Sig Dispense Refill     acetaminophen (TYLENOL) 500 MG tablet Take 500-1,000 mg by mouth every 6 hours as needed for mild pain       amLODIPine (NORVASC) 10 MG tablet Take 1 tablet (10 mg) by mouth daily 90 tablet 3     Ascorbic Acid (VITAMIN C) 500 MG CHEW Take 1 tablet by mouth daily        atorvastatin (LIPITOR) 80 MG tablet Take 1 tablet (80 mg) by mouth daily 90 tablet 3     Cholecalciferol (VITAMIN D3) 50 MCG (2000 UT) CAPS TAKE 3 CAPSULES (6,000 UNITS) BY MOUTH DAILY. 270 capsule 3     CRANBERRY EXTRACT PO Take 650 mg by mouth daily ~10 am  Takes 1       diclofenac (VOLTAREN) 1 % topical gel APPLY 4 GRAMS TO KNEES OR 2 GRAMS TO HANDS FOUR TIMES DAILY USING ENCLOSED DOSING CARD. 100 g 3     gabapentin (NEURONTIN) 300 MG capsule Take 4 capsules (1,200 mg) by mouth 3 times daily 1080 capsule 3     HYDROcodone-acetaminophen (NORCO) 5-325 MG tablet Take 1 tab for breakthrough pain 1-2 times a week. 30 tablet 0     levothyroxine (SYNTHROID/LEVOTHROID) 112 MCG tablet TAKE 1/2 TABLET BY MOUTH DAILY 45 tablet 3     lisinopril (ZESTRIL) 40 MG tablet Take 1 tablet (40 mg) by mouth daily 90 tablet 3     magnesium 250 MG tablet Take 1 tablet by mouth daily       melatonin 5 MG tablet Take 10 mg by mouth At Bedtime        metoprolol succinate ER (TOPROL XL) 50 MG 24 hr tablet Take 1 tablet (50 mg) by mouth daily 90 tablet 3     Multiple Vitamin (MULTI-VITAMIN) per tablet Take 1 tablet by mouth daily       ondansetron (ZOFRAN-ODT) 4 MG ODT tab Take 1 tablet (4 mg) by mouth every 12 hours as needed for nausea 180 tablet 3     spironolactone (ALDACTONE) 25 MG tablet Take 2 tablets (50 mg) by mouth daily 180 tablet 3     Vaginal Lubricant (REPHRESH) GEL Place 2 g vaginally every 3 days 2 g 3     hydrOXYzine (VISTARIL) 25 MG capsule Take 1 capsule (25 mg) by mouth At Bedtime (Patient not taking: Reported on 4/14/2023) 90 capsule 3     ibuprofen (ADVIL/MOTRIN) 200 MG tablet Take 400 mg by mouth 3 times daily as needed for pain (Patient not taking: Reported on 4/14/2023)       methocarbamol (ROBAXIN) 500 MG tablet Take 1 tablet (500 mg) by mouth 3 times daily (Patient not taking: Reported on 4/14/2023) 40 tablet 4     naloxone (NARCAN) 4 MG/0.1ML nasal spray Spray 1 spray (4 mg) into one nostril alternating  nostrils once as needed for opioid reversal every 2-3 minutes until assistance arrives (Patient not taking: Reported on 4/14/2023) 0.2 mL 0     senna-docusate (SENOKOT-S/PERICOLACE) 8.6-50 MG tablet Take 2 tablets by mouth 2 times daily (Patient not taking: Reported on 4/14/2023) 30 tablet 0              Physical Exam:   /79   Pulse 80   Temp 98.8  F (37.1  C) (Oral)   Resp 16   Wt 85.3 kg (188 lb)   SpO2 97%   BMI 29.44 kg/m      Gen: NAD, pleasant and cooperative, sitting in wheelchair  HEENT: Normocephalic, atraumatic, extra-ocular movements appear intact  Pulm: non-labored breathing in room air  Ext: mild edema in left leg surrounding fracture site, no tenderness in calves  Neuro/MSK:   Orientation: Oriented to person, place, time, situation. Exhibits good insight into her condition and ongoing management/symptoms.  Motor:  4+/5 with bilateral hip flexion, 5/5 with bilateral knee extension, 5/5 with left ankle dorsiflexion, EHL, plantar flexion.   Right ankle not tested with AFO today.    Labs/Imaging:  Lab Results   Component Value Date    WBC 7.8 10/27/2022    HGB 11.9 10/27/2022    HCT 37.3 10/27/2022    MCV 90 10/27/2022     10/27/2022     Lab Results   Component Value Date     01/11/2023    POTASSIUM 4.3 01/11/2023    CHLORIDE 105 01/11/2023    CO2 23 01/11/2023     (H) 01/11/2023     Lab Results   Component Value Date    GFRESTIMATED 77 04/12/2023    GFRESTBLACK >90 05/07/2021    GFRESTBLACK >90 05/07/2021     Lab Results   Component Value Date    AST 22 10/27/2022    ALT 16 10/27/2022    ALKPHOS 96 10/27/2022    BILITOTAL 0.2 10/27/2022    BILICONJ 0.0 11/08/2012     Lab Results   Component Value Date    INR 0.85 (L) 08/20/2010     Lab Results   Component Value Date    BUN 20.7 01/11/2023    CR 0.82 04/12/2023              Assessment/Plan   Nadira Perez presents to clinic today for follow up reg her rehab needs.   She has h/o left stage IIIC inflammatory breast cancer  (ER/MT+ve, HER2-ve). Was last seen in clinic on 1/13/23.  Multiple rehabilitation considerations were discussed with Ismael at today's visit.  Overall, she is doing much better with her ambulation with her right AFO on, and feels like it is now fitting well and she has gotten used to wearing it when up and ambulating.  She should continue to wear this at all times for transfers and ambulation.  On exam today, her left ankle exhibits good strength but we will continue to monitor this as she has some perceived weakness on that side  In the setting of her neuropathy.  Her left upper extremity lymphedema has been under good control, and she continues to work with lymphedema therapy and has a plan which she has already implemented for her home lymphedema management regimen which she will continue even after discharge from therapy.  She has been interested for some time in exploring RFA as an option for her lumbar symptoms, and would like a referral to pain management to have a discussion about this.  We discussed that her mild lumbar radiculopathy symptoms coexist but are not related to her weakness as a result of polyneuropathy.  A referral was placed to pain management today.  We will plan a 4-month return visit.  Ismael is in agreement with the above plan.        1. Work-up: Reviewed results of EMG and lab work-up from last visit.  2. Therapy/equipment/braces:  1. Continue  wearing right AFO for right foot drop to help with safety when transferring and ambulating.  2. Continue home exercise program including home lymphedema management regimen.  3. Continue working with lymphedema therapy.  4. Continue using cane and walker for assist with transfers and ambulation.  3. Medications:  1. Continue gabapentin at current dosing.  4. Interventions:  1. Referral placed to pain management team as patient would like to consider RFA for her lumbar symptoms.    5. Follow up: 4 months.      Carmen Centeno MD  Physical Medicine &  Rehabilitation      50 minutes spent on the date of the encounter doing chart review, history and exam, documentation and further activities as noted above.           01-Mar-2019

## 2023-04-16 LAB
HCG UR QL: NEGATIVE
QUALITY CONTROL: YES

## 2023-04-17 DIAGNOSIS — Z30.433 ENCOUNTER FOR REMOVAL AND REINSERTION OF INTRAUTERINE CONTRACEPTIVE DEVICE: ICD-10-CM

## 2023-04-21 ENCOUNTER — EMERGENCY (EMERGENCY)
Facility: HOSPITAL | Age: 43
LOS: 0 days | Discharge: HOME | End: 2023-04-21
Attending: EMERGENCY MEDICINE
Payer: MEDICAID

## 2023-04-21 VITALS
SYSTOLIC BLOOD PRESSURE: 114 MMHG | HEART RATE: 72 BPM | TEMPERATURE: 98 F | RESPIRATION RATE: 18 BRPM | OXYGEN SATURATION: 98 % | DIASTOLIC BLOOD PRESSURE: 61 MMHG

## 2023-04-21 DIAGNOSIS — J02.9 ACUTE PHARYNGITIS, UNSPECIFIED: ICD-10-CM

## 2023-04-21 DIAGNOSIS — J34.89 OTHER SPECIFIED DISORDERS OF NOSE AND NASAL SINUSES: ICD-10-CM

## 2023-04-21 DIAGNOSIS — E11.9 TYPE 2 DIABETES MELLITUS WITHOUT COMPLICATIONS: ICD-10-CM

## 2023-04-21 DIAGNOSIS — B20 HUMAN IMMUNODEFICIENCY VIRUS [HIV] DISEASE: ICD-10-CM

## 2023-04-21 DIAGNOSIS — Z79.84 LONG TERM (CURRENT) USE OF ORAL HYPOGLYCEMIC DRUGS: ICD-10-CM

## 2023-04-21 PROCEDURE — 99284 EMERGENCY DEPT VISIT MOD MDM: CPT

## 2023-04-21 PROCEDURE — 99283 EMERGENCY DEPT VISIT LOW MDM: CPT

## 2023-04-21 RX ORDER — DEXAMETHASONE 0.5 MG/5ML
10 ELIXIR ORAL ONCE
Refills: 0 | Status: COMPLETED | OUTPATIENT
Start: 2023-04-21 | End: 2023-04-21

## 2023-04-21 RX ORDER — IBUPROFEN 200 MG
600 TABLET ORAL ONCE
Refills: 0 | Status: COMPLETED | OUTPATIENT
Start: 2023-04-21 | End: 2023-04-21

## 2023-04-21 RX ADMIN — Medication 600 MILLIGRAM(S): at 18:04

## 2023-04-21 RX ADMIN — Medication 10 MILLIGRAM(S): at 18:03

## 2023-04-21 NOTE — ED PROVIDER NOTE - CLINICAL SUMMARY MEDICAL DECISION MAKING FREE TEXT BOX
42-year-old female presents emergency department for rhinorrhea and sore throat concerning for viral illness.  Patient does not have any tonsilar exudate or fever making bacterial infection unlikely.  Patient given Decadron and ibuprofen and discharged home with strict return precautions.

## 2023-04-21 NOTE — ED PROVIDER NOTE - PHYSICAL EXAMINATION
Const: NAD  Eyes: PERRL, no conjunctival injection  HENT:  Neck supple without meningismus, Midline nonswollen uvula no stridor no drooling   CV: RRR, Warm, well-perfused extremities  RESP: CTA B/L, no tachypnea   MSK: No gross deformities appreciated  Skin: Warm, dry. No rashes  Neuro: Alert, CNs II-XII grossly intact. Sensation and motor function of extremities grossly intact.  Psych: Appropriate mood and affect.

## 2023-04-21 NOTE — ED PROVIDER NOTE - OBJECTIVE STATEMENT
42-year-old male with past medical history of HIV on HAART, diabetes presents emergency department for 1 week of sore throat and rhinorrhea.  Patient is not taking anything for pain.  No fever no chills no change in voice no difficulty swallowing.

## 2023-04-21 NOTE — ED PROVIDER NOTE - PATIENT PORTAL LINK FT
You can access the FollowMyHealth Patient Portal offered by [facility] by registering at the following website: [email or web address]. By joining [facility]’s FollowMyHealth portal, you will also be able to view your health information using other applications (apps) compatible with our system.

## 2023-04-24 ENCOUNTER — APPOINTMENT (OUTPATIENT)
Dept: OPHTHALMOLOGY | Facility: CLINIC | Age: 43
End: 2023-04-24
Payer: MEDICAID

## 2023-04-24 ENCOUNTER — OUTPATIENT (OUTPATIENT)
Dept: OUTPATIENT SERVICES | Facility: HOSPITAL | Age: 43
LOS: 1 days | End: 2023-04-24
Payer: MEDICAID

## 2023-04-24 DIAGNOSIS — H53.30 UNSPECIFIED DISORDER OF BINOCULAR VISION: ICD-10-CM

## 2023-04-24 PROCEDURE — 99213 OFFICE O/P EST LOW 20 MIN: CPT

## 2023-04-28 DIAGNOSIS — H04.129 DRY EYE SYNDROME OF UNSPECIFIED LACRIMAL GLAND: ICD-10-CM

## 2023-04-28 DIAGNOSIS — H35.369 DRUSEN (DEGENERATIVE) OF MACULA, UNSPECIFIED EYE: ICD-10-CM

## 2023-04-28 DIAGNOSIS — E11.9 TYPE 2 DIABETES MELLITUS WITHOUT COMPLICATIONS: ICD-10-CM

## 2023-05-03 ENCOUNTER — OUTPATIENT (OUTPATIENT)
Dept: OUTPATIENT SERVICES | Facility: HOSPITAL | Age: 43
LOS: 1 days | End: 2023-05-03
Payer: MEDICAID

## 2023-05-03 ENCOUNTER — APPOINTMENT (OUTPATIENT)
Dept: ENDOCRINOLOGY | Facility: CLINIC | Age: 43
End: 2023-05-03

## 2023-05-03 VITALS
WEIGHT: 235 LBS | HEART RATE: 63 BPM | DIASTOLIC BLOOD PRESSURE: 80 MMHG | SYSTOLIC BLOOD PRESSURE: 123 MMHG | HEIGHT: 67 IN | BODY MASS INDEX: 36.88 KG/M2

## 2023-05-03 DIAGNOSIS — Z00.00 ENCOUNTER FOR GENERAL ADULT MEDICAL EXAMINATION WITHOUT ABNORMAL FINDINGS: ICD-10-CM

## 2023-05-03 PROCEDURE — 99214 OFFICE O/P EST MOD 30 MIN: CPT

## 2023-05-03 NOTE — ASSESSMENT
[FreeTextEntry1] : 43 y/o F PMHx DM2, PCO, obesity and HIV presenting to clinic for follow up. \par \par # DM type II\par - uncontrolled \par - Last a1c 6.7% (down from 9.2 in 1/2023)\par - on trulicity 1.5 mg weekly and pioglitazone 30 mg daily  \par - Patient did not tolerate metformin and ozempic. Januvia was ineffective.\par - regularly follows with podiatry (appt on 5/30) and ophthalmology (seen 4/2023)\par - start Mounjaro 5 mg weekly once trulicity is done \par - f/u in 3m\par \par # obesity\par # HLD\par - last LDL 71 in 3/2023\par - was on lipitor 40 mg daily, stopped\par - counselled on weight loss and exercise\par - has nutritionist appt \par \par #HIV: follows with Dr Cota\par #PCOS and contraception: follows with OBGYN. \par

## 2023-05-12 DIAGNOSIS — E11.9 TYPE 2 DIABETES MELLITUS WITHOUT COMPLICATIONS: ICD-10-CM

## 2023-05-12 DIAGNOSIS — E66.9 OBESITY, UNSPECIFIED: ICD-10-CM

## 2023-05-16 ENCOUNTER — APPOINTMENT (OUTPATIENT)
Dept: INFECTIOUS DISEASE | Facility: CLINIC | Age: 43
End: 2023-05-16

## 2023-05-16 ENCOUNTER — APPOINTMENT (OUTPATIENT)
Dept: INFECTIOUS DISEASE | Facility: CLINIC | Age: 43
End: 2023-05-16
Payer: MEDICAID

## 2023-05-16 ENCOUNTER — OUTPATIENT (OUTPATIENT)
Dept: OUTPATIENT SERVICES | Facility: HOSPITAL | Age: 43
LOS: 1 days | End: 2023-05-16
Payer: MEDICAID

## 2023-05-16 VITALS
WEIGHT: 232 LBS | RESPIRATION RATE: 16 BRPM | HEART RATE: 82 BPM | HEIGHT: 67 IN | OXYGEN SATURATION: 100 % | SYSTOLIC BLOOD PRESSURE: 98 MMHG | BODY MASS INDEX: 36.41 KG/M2 | TEMPERATURE: 98.5 F | DIASTOLIC BLOOD PRESSURE: 66 MMHG

## 2023-05-16 DIAGNOSIS — Z23 ENCOUNTER FOR IMMUNIZATION: ICD-10-CM

## 2023-05-16 DIAGNOSIS — E78.5 HYPERLIPIDEMIA, UNSPECIFIED: ICD-10-CM

## 2023-05-16 DIAGNOSIS — E66.9 OBESITY, UNSPECIFIED: ICD-10-CM

## 2023-05-16 DIAGNOSIS — E11.9 TYPE 2 DIABETES MELLITUS WITHOUT COMPLICATIONS: ICD-10-CM

## 2023-05-16 DIAGNOSIS — B20 HUMAN IMMUNODEFICIENCY VIRUS [HIV] DISEASE: ICD-10-CM

## 2023-05-16 PROCEDURE — 90472 IMMUNIZATION ADMIN EACH ADD: CPT

## 2023-05-16 PROCEDURE — 90471 IMMUNIZATION ADMIN: CPT

## 2023-05-16 PROCEDURE — 99214 OFFICE O/P EST MOD 30 MIN: CPT

## 2023-05-16 PROCEDURE — 90746 HEPB VACCINE 3 DOSE ADULT IM: CPT

## 2023-05-16 PROCEDURE — 90715 TDAP VACCINE 7 YRS/> IM: CPT

## 2023-05-16 NOTE — ASSESSMENT
[FreeTextEntry1] : #AIDS, now well controlled\par - dx 2/2019 with PCP PNA and vaginal HSV outbreak- likely via sexual contact\par - at time of dx CD4 70; 5% HIV-1 RNA Quantitative, Viral Load: [id number] (02.12.19 @ 00:45)\par Toxo IgG neg, CMV IgG +, RPR neg\par - outpatient labs CD4 537;  2/2022\par - continue biktarvy\par - VL UD\par - In a program with [facility]-- needs VL checked every 3 mo \par \par #Hx HSV Herpes\par - Continue Valtrex 1g daily for prophylaxis\par \par #Transaminitis 4/2022, 5/2022-- RESOLVED \par - could be related to fatty liver\par - social ETOH\par - trend\par \par #DM \par - Follows with Endo \par - Had SE with ozempic \par - Had SE with metformin\par - patient to follow with podiatry and opthalmology appointments\par \par # HLD \par - Cont atorvastatin 40 qd started \par \par #HCM \par - HPV vaccine w/ GYN\par - COVID 2 x pfizer recommended booster \par - Flu UTD\par - PCV20 UTD \par - 12/2021 COVID+\par - 4/6/22 pap neg, +BV, treated\par - Mammo to discuss with PCP \par - Hep B non-immune, start series today \par - Declines meningitis\par - Tetanus today \par \par I have personally reviewed all the available charts, laboratory data, radiology and consultation notes\par 31 min spent counseling patients. [HIV Education] : HIV Education

## 2023-05-17 LAB
ALBUMIN SERPL ELPH-MCNC: 4.1 G/DL
ALP BLD-CCNC: 96 U/L
ALT SERPL-CCNC: 16 U/L
ANION GAP SERPL CALC-SCNC: 11 MMOL/L
AST SERPL-CCNC: 21 U/L
BASOPHILS # BLD AUTO: 0.03 K/UL
BASOPHILS NFR BLD AUTO: 0.5 %
BILIRUB SERPL-MCNC: 0.3 MG/DL
BUN SERPL-MCNC: 18 MG/DL
CALCIUM SERPL-MCNC: 9.1 MG/DL
CHLORIDE SERPL-SCNC: 104 MMOL/L
CO2 SERPL-SCNC: 22 MMOL/L
CREAT SERPL-MCNC: 0.9 MG/DL
EGFR: 81 ML/MIN/1.73M2
EOSINOPHIL # BLD AUTO: 0.2 K/UL
EOSINOPHIL NFR BLD AUTO: 3.1 %
GLUCOSE SERPL-MCNC: 84 MG/DL
HCT VFR BLD CALC: 40.7 %
HGB BLD-MCNC: 13.4 G/DL
IMM GRANULOCYTES NFR BLD AUTO: 0.3 %
LYMPHOCYTES # BLD AUTO: 1.98 K/UL
LYMPHOCYTES NFR BLD AUTO: 30.8 %
MAN DIFF?: NORMAL
MCHC RBC-ENTMCNC: 32.7 PG
MCHC RBC-ENTMCNC: 32.9 G/DL
MCV RBC AUTO: 99.3 FL
MONOCYTES # BLD AUTO: 0.54 K/UL
MONOCYTES NFR BLD AUTO: 8.4 %
NEUTROPHILS # BLD AUTO: 3.66 K/UL
NEUTROPHILS NFR BLD AUTO: 56.9 %
PLATELET # BLD AUTO: 265 K/UL
POTASSIUM SERPL-SCNC: 4.1 MMOL/L
PROT SERPL-MCNC: 6.7 G/DL
RBC # BLD: 4.1 M/UL
RBC # FLD: 13 %
SODIUM SERPL-SCNC: 137 MMOL/L
WBC # FLD AUTO: 6.43 K/UL

## 2023-05-18 LAB
CD16+CD56+ CELLS # BLD: 200 /UL
CD16+CD56+ CELLS NFR BLD: 10 %
CD19 CELLS NFR BLD: 379 /UL
CD3 CELLS # BLD: 1309 /UL
CD3 CELLS NFR BLD: 69 %
CD3+CD4+ CELLS # BLD: 578 /UL
CD3+CD4+ CELLS NFR BLD: 30 %
CD3+CD4+ CELLS/CD3+CD8+ CLL SPEC: 0.78 RATIO
CD3+CD8+ CELLS # SPEC: 744 /UL
CD3+CD8+ CELLS NFR BLD: 39 %
CELLS.CD3-CD19+/CELLS IN BLOOD: 20 %

## 2023-05-19 ENCOUNTER — APPOINTMENT (OUTPATIENT)
Dept: OBGYN | Facility: CLINIC | Age: 43
End: 2023-05-19
Payer: MEDICAID

## 2023-05-19 ENCOUNTER — OUTPATIENT (OUTPATIENT)
Dept: OUTPATIENT SERVICES | Facility: HOSPITAL | Age: 43
LOS: 1 days | End: 2023-05-19
Payer: MEDICAID

## 2023-05-19 VITALS
BODY MASS INDEX: 36.89 KG/M2 | HEIGHT: 67 IN | SYSTOLIC BLOOD PRESSURE: 100 MMHG | WEIGHT: 235.06 LBS | DIASTOLIC BLOOD PRESSURE: 60 MMHG

## 2023-05-19 DIAGNOSIS — Z30.431 ENCOUNTER FOR ROUTINE CHECKING OF INTRAUTERINE CONTRACEPTIVE DEVICE: ICD-10-CM

## 2023-05-19 LAB
HIV1 RNA # SERPL NAA+PROBE: NOT DETECTED COPIES/ML
VIRAL LOAD INTERP: NOT DETECTED COPIES/ML
VIRAL LOAD LOG: NOT DETECTED LOGCOPIES/ML

## 2023-05-19 PROCEDURE — 99213 OFFICE O/P EST LOW 20 MIN: CPT

## 2023-05-24 DIAGNOSIS — Z30.431 ENCOUNTER FOR ROUTINE CHECKING OF INTRAUTERINE CONTRACEPTIVE DEVICE: ICD-10-CM

## 2023-05-30 ENCOUNTER — APPOINTMENT (OUTPATIENT)
Dept: PODIATRY | Facility: CLINIC | Age: 43
End: 2023-05-30

## 2023-05-30 ENCOUNTER — OUTPATIENT (OUTPATIENT)
Dept: OUTPATIENT SERVICES | Facility: HOSPITAL | Age: 43
LOS: 1 days | End: 2023-05-30
Payer: MEDICAID

## 2023-05-30 ENCOUNTER — APPOINTMENT (OUTPATIENT)
Dept: PODIATRY | Facility: CLINIC | Age: 43
End: 2023-05-30
Payer: MEDICAID

## 2023-05-30 ENCOUNTER — NON-APPOINTMENT (OUTPATIENT)
Age: 43
End: 2023-05-30

## 2023-05-30 DIAGNOSIS — Z00.00 ENCOUNTER FOR GENERAL ADULT MEDICAL EXAMINATION WITHOUT ABNORMAL FINDINGS: ICD-10-CM

## 2023-05-30 PROCEDURE — 99213 OFFICE O/P EST LOW 20 MIN: CPT | Mod: GC

## 2023-05-30 PROCEDURE — 99213 OFFICE O/P EST LOW 20 MIN: CPT

## 2023-05-30 NOTE — END OF VISIT
[] : Resident [FreeTextEntry3] : Modified ADA Diabetic Foot Risk Classification\par A1c reviewed \par -Loss of protective sensation: no\par -Presence of foot deformity:    no \par -presence of pre-ulcerative lesion:  no\par   -hemorrhage into callus:  no\par -atrophy of heel or metatarsal fat pads:  no\par \par -Diabetic neurovascular foot assessment  performed. \par -Discussed with patient diabetic foot hygiene.Patient instructed to regularly check the bottom of the feet\par -rx clotrimazole\par -Return 6 month\par \par My notes supersedes the above resident documentation in case of discrepancy. Correction for their notes is in my notes. \par \par \par

## 2023-05-30 NOTE — PHYSICAL EXAM
[General Appearance - Alert] : alert [General Appearance - In No Acute Distress] : in no acute distress [2+] : left foot dorsalis pedis 2+ [Normal Foot/Ankle] : Both lower extremities were exposed and visualized. Standing exam demonstrates normal foot posture and alignment. Hindfoot exam shows no hindfoot valgus or varus [Skin Color & Pigmentation] : normal skin color and pigmentation [Sensation] : the sensory exam was normal to light touch and pinprick [Oriented To Time, Place, And Person] : oriented to person, place, and time [Impaired Insight] : insight and judgment were intact [FreeTextEntry1] : Mild moccasin sign on BL feet plantar aspect [Vibration Dec.] : normal vibratory sensation at the level of the toes [Diminished Throughout Right Foot] : normal sensation with monofilament testing throughout right foot [Diminished Throughout Left Foot] : normal sensation with monofilament testing throughout left foot

## 2023-05-30 NOTE — HISTORY OF PRESENT ILLNESS
[FreeTextEntry1] : 42 F presents for diabetic foot check up states she has been diabetic for one year and was referred here by her nutritionist. A1c 8.2% 5/2022\par \par 5/30/23 Came back in 6 months for diabetic foot check up; recent A1c is 6.7% 4/2023, complaints about itchyness of BL plantar feet

## 2023-05-30 NOTE — ASSESSMENT
[FreeTextEntry1] : Assessment: Diabetic foot check up, tinea pedis\par \par Plan:\par Pt seen and eval; \par Rx Clotromazole; \par F/u 3 weeks for tinea pedis f/u; \par 6 months f/u w/ routine diabetic check up;

## 2023-05-31 ENCOUNTER — NON-APPOINTMENT (OUTPATIENT)
Age: 43
End: 2023-05-31

## 2023-06-01 DIAGNOSIS — E11.65 TYPE 2 DIABETES MELLITUS WITH HYPERGLYCEMIA: ICD-10-CM

## 2023-06-01 DIAGNOSIS — B35.3 TINEA PEDIS: ICD-10-CM

## 2023-06-15 RX ORDER — CLOTRIMAZOLE 10 MG/G
1 CREAM TOPICAL TWICE DAILY
Qty: 1 | Refills: 3 | Status: ACTIVE | COMMUNITY
Start: 2023-05-30

## 2023-06-21 ENCOUNTER — APPOINTMENT (OUTPATIENT)
Dept: SURGERY | Facility: CLINIC | Age: 43
End: 2023-06-21
Payer: MEDICAID

## 2023-06-21 VITALS
DIASTOLIC BLOOD PRESSURE: 64 MMHG | HEART RATE: 68 BPM | HEIGHT: 65 IN | WEIGHT: 233 LBS | SYSTOLIC BLOOD PRESSURE: 102 MMHG | OXYGEN SATURATION: 97 % | BODY MASS INDEX: 38.82 KG/M2 | TEMPERATURE: 96.8 F

## 2023-06-21 PROCEDURE — 99205 OFFICE O/P NEW HI 60 MIN: CPT

## 2023-06-21 RX ORDER — KETOCONAZOLE 20 MG/G
2 CREAM TOPICAL TWICE DAILY
Qty: 1 | Refills: 0 | Status: ACTIVE | COMMUNITY
Start: 2023-06-21 | End: 1900-01-01

## 2023-06-22 NOTE — ASSESSMENT
[FreeTextEntry1] : 42-year-old female with class II obesity, diabetes, HIV interested in discussing surgical weight loss options

## 2023-06-22 NOTE — PLAN
[FreeTextEntry1] : A long discussion was had with the patient via a  and it appeared that she was mostly interested in cosmetic surgery or an abdominoplasty.  After explaining what bariatric surgery was to the patient she was interested but did not seem to have a full grasp of the difference between the 2.  It appears that she really wants skin removal from her abdomen which we would not perform.  After discussing the whole process with her we decided that we would move forward with obtaining her clearances and the chance that she may move forward with bariatric surgery, however she will need additional appointments to re-discuss the procedure and its risks if she does decide to move forward with bariatric surgery.\par \par   She will require the following pre-operative evaluations and testing:\par \par Lab work\par Psychological evaluation\par Diet history\par Infectious Dz evaluation \par Gastroenterology evaluation for upper endoscopy\par PMD clearance\par Nutritional evaluation\par Attendance at preoperative support groups\par We had a discussion about potential post-operative complications, including but not limited to: bleeding, infection, leak, stricture, blood clots, GERD, problems with anesthesia.  The patient understands and wishes to proceed.\par \par \par

## 2023-06-22 NOTE — PHYSICAL EXAM
[Normal] : affect appropriate [de-identified] : No distress [de-identified] : Nonlabored breathing [de-identified] : S1, S2 [de-identified] : Obese, soft, nontender

## 2023-06-22 NOTE — HISTORY OF PRESENT ILLNESS
[de-identified] : Ms. BELEN APPIAH is a pleasant 42 year year old female who has been struggling with Her weight for many years.    Ms. BELEN APPIAH has tried countless diet and exercise programs, but has been unable to lose sufficient weight and keep it off.  She is here today to discuss surgical options for weight loss.\par She has a medical history of HIV and diabetes on multiple medications.  She has had no abdominal surgeries.  She is a non-smoker.\par She does not have a great support system and her only help at home is her HIV .  She lives alone.\par \par \par

## 2023-06-27 ENCOUNTER — OUTPATIENT (OUTPATIENT)
Dept: OUTPATIENT SERVICES | Facility: HOSPITAL | Age: 43
LOS: 1 days | End: 2023-06-27
Payer: MEDICAID

## 2023-06-27 ENCOUNTER — APPOINTMENT (OUTPATIENT)
Dept: PODIATRY | Facility: CLINIC | Age: 43
End: 2023-06-27
Payer: MEDICAID

## 2023-06-27 DIAGNOSIS — Z00.00 ENCOUNTER FOR GENERAL ADULT MEDICAL EXAMINATION WITHOUT ABNORMAL FINDINGS: ICD-10-CM

## 2023-06-27 DIAGNOSIS — B35.3 TINEA PEDIS: ICD-10-CM

## 2023-06-27 PROCEDURE — 99213 OFFICE O/P EST LOW 20 MIN: CPT

## 2023-06-27 RX ORDER — TERBINAFINE HYDROCHLORIDE 250 MG/1
250 TABLET ORAL DAILY
Qty: 14 | Refills: 0 | Status: ACTIVE | COMMUNITY
Start: 2023-06-27 | End: 1900-01-01

## 2023-06-28 PROBLEM — B35.3 TINEA PEDIS OF BOTH FEET: Status: ACTIVE | Noted: 2023-05-30

## 2023-06-28 NOTE — HISTORY OF PRESENT ILLNESS
[FreeTextEntry1] : 42 F presents for diabetic foot check up states she has been diabetic for one year and was referred here by her nutritionist. A1c 8.2% 5/2022\par \par 5/30/23 Came back in 6 months for diabetic foot check up; recent A1c is 6.7% 4/2023, complaints about itchyness of BL plantar feet\par 6/28 returns for management of tinea pedis. notes some improvement with topical cream but still reports itching and scaling

## 2023-06-28 NOTE — ASSESSMENT
[FreeTextEntry1] : Assessment: Diabetic foot check up, tinea pedis\par \par Plan:\par continue Clotromazole; \par Rx terbinafine 250mg qd\par return 6 weeks if no resolution

## 2023-06-29 ENCOUNTER — APPOINTMENT (OUTPATIENT)
Dept: SURGERY | Facility: CLINIC | Age: 43
End: 2023-06-29
Payer: MEDICAID

## 2023-06-29 VITALS — HEIGHT: 65 IN

## 2023-06-29 DIAGNOSIS — B35.3 TINEA PEDIS: ICD-10-CM

## 2023-06-29 PROCEDURE — 97802 MEDICAL NUTRITION INDIV IN: CPT | Mod: 95

## 2023-07-14 ENCOUNTER — OUTPATIENT (OUTPATIENT)
Dept: OUTPATIENT SERVICES | Facility: HOSPITAL | Age: 43
LOS: 1 days | End: 2023-07-14
Payer: MEDICAID

## 2023-07-14 ENCOUNTER — APPOINTMENT (OUTPATIENT)
Dept: NUTRITION | Facility: CLINIC | Age: 43
End: 2023-07-14

## 2023-07-14 DIAGNOSIS — Z00.00 ENCOUNTER FOR GENERAL ADULT MEDICAL EXAMINATION WITHOUT ABNORMAL FINDINGS: ICD-10-CM

## 2023-07-14 PROCEDURE — G0108: CPT

## 2023-07-17 ENCOUNTER — APPOINTMENT (OUTPATIENT)
Dept: SURGERY | Facility: CLINIC | Age: 43
End: 2023-07-17

## 2023-07-17 DIAGNOSIS — E11.65 TYPE 2 DIABETES MELLITUS WITH HYPERGLYCEMIA: ICD-10-CM

## 2023-07-19 ENCOUNTER — NON-APPOINTMENT (OUTPATIENT)
Age: 43
End: 2023-07-19

## 2023-08-01 ENCOUNTER — RX RENEWAL (OUTPATIENT)
Age: 43
End: 2023-08-01

## 2023-08-15 ENCOUNTER — APPOINTMENT (OUTPATIENT)
Dept: INFECTIOUS DISEASE | Facility: CLINIC | Age: 43
End: 2023-08-15
Payer: MEDICAID

## 2023-08-15 ENCOUNTER — OUTPATIENT (OUTPATIENT)
Dept: OUTPATIENT SERVICES | Facility: HOSPITAL | Age: 43
LOS: 1 days | End: 2023-08-15
Payer: MEDICAID

## 2023-08-15 VITALS
HEIGHT: 65 IN | HEART RATE: 64 BPM | SYSTOLIC BLOOD PRESSURE: 110 MMHG | WEIGHT: 233 LBS | BODY MASS INDEX: 38.82 KG/M2 | DIASTOLIC BLOOD PRESSURE: 79 MMHG | OXYGEN SATURATION: 99 % | TEMPERATURE: 97.6 F | RESPIRATION RATE: 14 BRPM

## 2023-08-15 DIAGNOSIS — E11.9 TYPE 2 DIABETES MELLITUS WITHOUT COMPLICATIONS: ICD-10-CM

## 2023-08-15 DIAGNOSIS — E78.5 HYPERLIPIDEMIA, UNSPECIFIED: ICD-10-CM

## 2023-08-15 DIAGNOSIS — E66.9 OBESITY, UNSPECIFIED: ICD-10-CM

## 2023-08-15 DIAGNOSIS — Z23 ENCOUNTER FOR IMMUNIZATION: ICD-10-CM

## 2023-08-15 DIAGNOSIS — B20 HUMAN IMMUNODEFICIENCY VIRUS [HIV] DISEASE: ICD-10-CM

## 2023-08-15 PROCEDURE — 90471 IMMUNIZATION ADMIN: CPT

## 2023-08-15 PROCEDURE — 90746 HEPB VACCINE 3 DOSE ADULT IM: CPT

## 2023-08-15 PROCEDURE — 99214 OFFICE O/P EST MOD 30 MIN: CPT

## 2023-08-16 LAB
ALBUMIN SERPL ELPH-MCNC: 4.2 G/DL
ALP BLD-CCNC: 89 U/L
ALT SERPL-CCNC: 20 U/L
ANION GAP SERPL CALC-SCNC: 9 MMOL/L
AST SERPL-CCNC: 21 U/L
BILIRUB SERPL-MCNC: 0.3 MG/DL
BUN SERPL-MCNC: 25 MG/DL
CALCIUM SERPL-MCNC: 9.3 MG/DL
CD16+CD56+ CELLS # BLD: 158 /UL
CD16+CD56+ CELLS NFR BLD: 8 %
CD19 CELLS NFR BLD: 354 /UL
CD3 CELLS # BLD: 1362 /UL
CD3 CELLS NFR BLD: 72 %
CD3+CD4+ CELLS # BLD: 619 /UL
CD3+CD4+ CELLS NFR BLD: 33 %
CD3+CD4+ CELLS/CD3+CD8+ CLL SPEC: 0.82 RATIO
CD3+CD8+ CELLS # SPEC: 757 /UL
CD3+CD8+ CELLS NFR BLD: 40 %
CELLS.CD3-CD19+/CELLS IN BLOOD: 19 %
CHLORIDE SERPL-SCNC: 109 MMOL/L
CHOLEST SERPL-MCNC: 157 MG/DL
CO2 SERPL-SCNC: 24 MMOL/L
CREAT SERPL-MCNC: 1 MG/DL
EGFR: 71 ML/MIN/1.73M2
ESTIMATED AVERAGE GLUCOSE: 111 MG/DL
GLUCOSE SERPL-MCNC: 100 MG/DL
HBA1C MFR BLD HPLC: 5.5 %
HDLC SERPL-MCNC: 40 MG/DL
HIV1 RNA # SERPL NAA+PROBE: NOT DETECTED COPIES/ML
LDLC SERPL CALC-MCNC: 76 MG/DL
NONHDLC SERPL-MCNC: 117 MG/DL
POTASSIUM SERPL-SCNC: 4.9 MMOL/L
PROT SERPL-MCNC: 7.1 G/DL
SODIUM SERPL-SCNC: 142 MMOL/L
TRIGL SERPL-MCNC: 207 MG/DL
VIRAL LOAD INTERP: NOT DETECTED
VIRAL LOAD LOG: NOT DETECTED LOGCOPIES/ML

## 2023-08-16 NOTE — ASSESSMENT
[HIV Education] : HIV Education [FreeTextEntry1] : #AIDS, now well controlled - dx 2/2019 with PCP PNA and vaginal HSV outbreak- likely via sexual contact - at time of dx CD4 70; 5% HIV-1 RNA Quantitative, Viral Load: [id number] (02.12.19 @ 00:45) Toxo IgG neg, CMV IgG +, RPR neg - outpatient labs CD4 537;  2/2022 - continue biktarvy - VL UD - In a program with [facility]-- needs VL checked every 3 mo  #R pannus pain- healed furuncle. NTD  #Hx HSV Herpes - Continue Valtrex 1g daily for prophylaxis  #Transaminitis 4/2022, 5/2022-- RESOLVED - could be related to fatty liver - social ETOH - trend  #DM - Follows with Endo - Had SE with ozempic- abd pain, vomiting  - Had SE with metformin - patient to follow with podiatry and opthalmology appointments   # HLD - Cont atorvastatin 40 qd started   #HCM - HPV vaccine w/ GYN - COVID 2 x pfizer recommended booster - Flu UTD - PCV20 UTD - 12/2021 COVID+ - 4/6/22 pap neg, +BV, treated - Mammo to discuss with PCP - Hep B non-immune, 2nd shot today  - Declines meningitis - Tetanus UTD   I have personally reviewed all the available charts, laboratory data, radiology and consultation notes 31 min spent counseling patients.

## 2023-08-16 NOTE — PHYSICAL EXAM
[General Appearance - Alert] : alert [General Appearance - In No Acute Distress] : in no acute distress [Sclera] : the sclera and conjunctiva were normal [PERRL With Normal Accommodation] : pupils were equal in size, round, reactive to light [Extraocular Movements] : extraocular movements were intact [Outer Ear] : the ears and nose were normal in appearance [Oropharynx] : the oropharynx was normal with no thrush [Neck Appearance] : the appearance of the neck was normal [Neck Cervical Mass (___cm)] : no neck mass was observed [Jugular Venous Distention Increased] : there was no jugular-venous distention [Thyroid Diffuse Enlargement] : the thyroid was not enlarged [Auscultation Breath Sounds / Voice Sounds] : lungs were clear to auscultation bilaterally [Heart Rate And Rhythm] : heart rate was normal and rhythm regular [Heart Sounds] : normal S1 and S2 [Heart Sounds Gallop] : no gallops [Murmurs] : no murmurs [Heart Sounds Pericardial Friction Rub] : no pericardial rub [Edema] : there was no peripheral edema [Full Pulse] : the pedal pulses are present [Abdomen Soft] : soft [Bowel Sounds] : normal bowel sounds [Abdomen Tenderness] : non-tender [Abdomen Mass (___ Cm)] : no abdominal mass palpated [Costovertebral Angle Tenderness] : no CVA tenderness [Musculoskeletal - Swelling] : no joint swelling [Nail Clubbing] : no clubbing  or cyanosis of the fingernails [Motor Tone] : muscle strength and tone were normal [Skin Color & Pigmentation] : normal skin color and pigmentation [] : no rash [Deep Tendon Reflexes (DTR)] : deep tendon reflexes were 2+ and symmetric [Sensation] : the sensory exam was normal to light touch and pinprick [No Focal Deficits] : no focal deficits [Oriented To Time, Place, And Person] : oriented to person, place, and time [Affect] : the affect was normal [FreeTextEntry1] : healed skin lesions R pannus

## 2023-08-17 NOTE — ED ADULT NURSE NOTE - NSFALLRSKINDICATORS_ED_ALL_ED
Detail Level: Detailed Quality 130: Documentation Of Current Medications In The Medical Record: Current Medications Documented Quality 110: Preventive Care And Screening: Influenza Immunization: Influenza immunization was not ordered or administered, reason not given Quality 431: Preventive Care And Screening: Unhealthy Alcohol Use - Screening: Patient not identified as an unhealthy alcohol user when screened for unhealthy alcohol use using a systematic screening method Quality 226: Preventive Care And Screening: Tobacco Use: Screening And Cessation Intervention: Tobacco Screening not Performed no

## 2023-08-24 ENCOUNTER — APPOINTMENT (OUTPATIENT)
Dept: INTERNAL MEDICINE | Facility: CLINIC | Age: 43
End: 2023-08-24

## 2023-08-25 ENCOUNTER — NON-APPOINTMENT (OUTPATIENT)
Age: 43
End: 2023-08-25

## 2023-08-30 ENCOUNTER — APPOINTMENT (OUTPATIENT)
Dept: SURGERY | Facility: CLINIC | Age: 43
End: 2023-08-30
Payer: MEDICAID

## 2023-08-30 VITALS
BODY MASS INDEX: 38.82 KG/M2 | HEIGHT: 65 IN | HEART RATE: 69 BPM | OXYGEN SATURATION: 96 % | DIASTOLIC BLOOD PRESSURE: 78 MMHG | WEIGHT: 233 LBS | TEMPERATURE: 97 F | SYSTOLIC BLOOD PRESSURE: 122 MMHG

## 2023-08-30 PROCEDURE — 99214 OFFICE O/P EST MOD 30 MIN: CPT

## 2023-09-01 NOTE — PHYSICAL EXAM
[Normal] : affect appropriate [de-identified] : No distress [de-identified] : S1, S2 [de-identified] : Nonlabored breathing [de-identified] : Obese

## 2023-09-01 NOTE — HISTORY OF PRESENT ILLNESS
[de-identified] : Ms. BELEN APPIAH is a pleasant 42 year year old female who has been struggling with Her weight for many years.    Ms. BELEN APPIAH has tried countless diet and exercise programs, but has been unable to lose sufficient weight and keep it off.  She is here today to discuss surgical options for weight loss. She has a medical history of HIV and diabetes on multiple medications.  She has had no abdominal surgeries.  She is a non-smoker. She does not have a great support system and her only help at home is her HIV .  She lives alone.  Since her first visit she has been seeing [facility] for nutrional visits. She is the same weight as her initial visit. She expressed to [facility] that she no longer was interested in surgical weight loss. She is currently taking Monjaro prescribed by her endocrinologist but this has not helped her lose weight and she is interested in "different medication." 
Yes

## 2023-09-01 NOTE — PLAN
[FreeTextEntry1] : As she is already on Monjaro though her endocrinologist- she should continue to follow with them to adjust the dose or trial different meds. She is allergic to ozempic. She should continue to see [facility] if interested in our medical weight loss program.  She would like to be scheduled with our new medical weight loss physician for additional advice when she starts later this fall.

## 2023-09-01 NOTE — ASSESSMENT
[FreeTextEntry1] : 42-year-old female with class II obesity, diabetes, HIV interested in medical weight loss opinions

## 2023-09-08 ENCOUNTER — APPOINTMENT (OUTPATIENT)
Dept: NUTRITION | Facility: CLINIC | Age: 43
End: 2023-09-08

## 2023-09-12 ENCOUNTER — NON-APPOINTMENT (OUTPATIENT)
Age: 43
End: 2023-09-12

## 2023-09-13 LAB
ALBUMIN SERPL ELPH-MCNC: 4 G/DL
ALP BLD-CCNC: 92 U/L
ALT SERPL-CCNC: 24 U/L
ANION GAP SERPL CALC-SCNC: 10 MMOL/L
AST SERPL-CCNC: 17 U/L
BASOPHILS # BLD AUTO: 0.03 K/UL
BASOPHILS NFR BLD AUTO: 0.4 %
BILIRUB SERPL-MCNC: 0.2 MG/DL
BUN SERPL-MCNC: 16 MG/DL
CALCIUM SERPL-MCNC: 8.8 MG/DL
CHLORIDE SERPL-SCNC: 110 MMOL/L
CHOLEST SERPL-MCNC: 132 MG/DL
CO2 SERPL-SCNC: 22 MMOL/L
CREAT SERPL-MCNC: 1 MG/DL
EGFR: 71 ML/MIN/1.73M2
EOSINOPHIL # BLD AUTO: 0.17 K/UL
EOSINOPHIL NFR BLD AUTO: 2.2 %
ESTIMATED AVERAGE GLUCOSE: 146 MG/DL
GLUCOSE SERPL-MCNC: 109 MG/DL
HBA1C MFR BLD HPLC: 6.7 %
HCT VFR BLD CALC: 44.4 %
HDLC SERPL-MCNC: 33 MG/DL
HGB BLD-MCNC: 14.4 G/DL
IMM GRANULOCYTES NFR BLD AUTO: 0.5 %
LDLC SERPL CALC-MCNC: 71 MG/DL
LYMPHOCYTES # BLD AUTO: 2.54 K/UL
LYMPHOCYTES NFR BLD AUTO: 32.5 %
MAN DIFF?: NORMAL
MCHC RBC-ENTMCNC: 32.1 PG
MCHC RBC-ENTMCNC: 32.4 G/DL
MCV RBC AUTO: 99.1 FL
MONOCYTES # BLD AUTO: 0.65 K/UL
MONOCYTES NFR BLD AUTO: 8.3 %
NEUTROPHILS # BLD AUTO: 4.38 K/UL
NEUTROPHILS NFR BLD AUTO: 56.1 %
NONHDLC SERPL-MCNC: 99 MG/DL
PLATELET # BLD AUTO: 283 K/UL
POTASSIUM SERPL-SCNC: 5.5 MMOL/L
PROT SERPL-MCNC: 6.7 G/DL
RBC # BLD: 4.48 M/UL
RBC # FLD: 13.1 %
SODIUM SERPL-SCNC: 142 MMOL/L
TRIGL SERPL-MCNC: 140 MG/DL
TSH SERPL-ACNC: 1.54 UIU/ML
WBC # FLD AUTO: 7.81 K/UL

## 2023-09-15 ENCOUNTER — OUTPATIENT (OUTPATIENT)
Dept: OUTPATIENT SERVICES | Facility: HOSPITAL | Age: 43
LOS: 1 days | End: 2023-09-15
Payer: MEDICAID

## 2023-09-15 ENCOUNTER — APPOINTMENT (OUTPATIENT)
Dept: OBGYN | Facility: CLINIC | Age: 43
End: 2023-09-15
Payer: MEDICAID

## 2023-09-15 VITALS
WEIGHT: 230.25 LBS | DIASTOLIC BLOOD PRESSURE: 74 MMHG | HEIGHT: 65 IN | BODY MASS INDEX: 38.36 KG/M2 | SYSTOLIC BLOOD PRESSURE: 108 MMHG

## 2023-09-15 DIAGNOSIS — Z01.419 ENCOUNTER FOR GYNECOLOGICAL EXAMINATION (GENERAL) (ROUTINE) W/OUT ABNORMAL FINDINGS: ICD-10-CM

## 2023-09-15 DIAGNOSIS — Z01.419 ENCOUNTER FOR GYNECOLOGICAL EXAMINATION (GENERAL) (ROUTINE) WITHOUT ABNORMAL FINDINGS: ICD-10-CM

## 2023-09-15 PROCEDURE — 87624 HPV HI-RISK TYP POOLED RSLT: CPT

## 2023-09-15 PROCEDURE — 88142 CYTOPATH C/V THIN LAYER: CPT

## 2023-09-15 PROCEDURE — 99396 PREV VISIT EST AGE 40-64: CPT

## 2023-09-15 RX ORDER — MEDROXYPROGESTERONE ACETATE 10 MG/1
10 TABLET ORAL
Qty: 10 | Refills: 0 | Status: ACTIVE | COMMUNITY
Start: 2023-09-15 | End: 1900-01-01

## 2023-09-18 ENCOUNTER — OUTPATIENT (OUTPATIENT)
Dept: OUTPATIENT SERVICES | Facility: HOSPITAL | Age: 43
LOS: 1 days | End: 2023-09-18

## 2023-09-18 DIAGNOSIS — Z01.419 ENCOUNTER FOR GYNECOLOGICAL EXAMINATION (GENERAL) (ROUTINE) WITHOUT ABNORMAL FINDINGS: ICD-10-CM

## 2023-09-19 DIAGNOSIS — Z01.419 ENCOUNTER FOR GYNECOLOGICAL EXAMINATION (GENERAL) (ROUTINE) WITHOUT ABNORMAL FINDINGS: ICD-10-CM

## 2023-09-20 DIAGNOSIS — Z01.419 ENCOUNTER FOR GYNECOLOGICAL EXAMINATION (GENERAL) (ROUTINE) WITHOUT ABNORMAL FINDINGS: ICD-10-CM

## 2023-09-20 LAB
HCG UR QL: NEGATIVE
HPV HIGH+LOW RISK DNA PNL CVX: NOT DETECTED
QUALITY CONTROL: YES

## 2023-09-23 LAB — CYTOLOGY CVX/VAG DOC THIN PREP: ABNORMAL

## 2023-09-29 ENCOUNTER — APPOINTMENT (OUTPATIENT)
Dept: INTERNAL MEDICINE | Facility: CLINIC | Age: 43
End: 2023-09-29
Payer: MEDICAID

## 2023-09-29 ENCOUNTER — OUTPATIENT (OUTPATIENT)
Dept: OUTPATIENT SERVICES | Facility: HOSPITAL | Age: 43
LOS: 1 days | End: 2023-09-29
Payer: MEDICAID

## 2023-09-29 VITALS
TEMPERATURE: 97.3 F | HEIGHT: 65 IN | BODY MASS INDEX: 37.82 KG/M2 | DIASTOLIC BLOOD PRESSURE: 69 MMHG | WEIGHT: 227 LBS | OXYGEN SATURATION: 97 % | HEART RATE: 82 BPM | SYSTOLIC BLOOD PRESSURE: 106 MMHG

## 2023-09-29 DIAGNOSIS — Z23 ENCOUNTER FOR IMMUNIZATION: ICD-10-CM

## 2023-09-29 DIAGNOSIS — Z00.00 ENCOUNTER FOR GENERAL ADULT MEDICAL EXAMINATION WITHOUT ABNORMAL FINDINGS: ICD-10-CM

## 2023-09-29 PROCEDURE — ZZZZZ: CPT

## 2023-09-29 PROCEDURE — 90471 IMMUNIZATION ADMIN: CPT | Mod: 25

## 2023-09-29 PROCEDURE — 99213 OFFICE O/P EST LOW 20 MIN: CPT

## 2023-10-03 DIAGNOSIS — Z23 ENCOUNTER FOR IMMUNIZATION: ICD-10-CM

## 2023-10-06 ENCOUNTER — OUTPATIENT (OUTPATIENT)
Dept: OUTPATIENT SERVICES | Facility: HOSPITAL | Age: 43
LOS: 1 days | End: 2023-10-06
Payer: MEDICAID

## 2023-10-06 DIAGNOSIS — Z01.419 ENCOUNTER FOR GYNECOLOGICAL EXAMINATION (GENERAL) (ROUTINE) WITHOUT ABNORMAL FINDINGS: ICD-10-CM

## 2023-10-06 PROCEDURE — 82670 ASSAY OF TOTAL ESTRADIOL: CPT

## 2023-10-06 PROCEDURE — 36415 COLL VENOUS BLD VENIPUNCTURE: CPT

## 2023-10-06 PROCEDURE — 84403 ASSAY OF TOTAL TESTOSTERONE: CPT

## 2023-10-06 PROCEDURE — 83001 ASSAY OF GONADOTROPIN (FSH): CPT

## 2023-10-06 PROCEDURE — 83520 IMMUNOASSAY QUANT NOS NONAB: CPT

## 2023-10-06 PROCEDURE — 82627 DEHYDROEPIANDROSTERONE: CPT

## 2023-10-06 PROCEDURE — 84443 ASSAY THYROID STIM HORMONE: CPT

## 2023-10-06 PROCEDURE — 84146 ASSAY OF PROLACTIN: CPT

## 2023-10-13 ENCOUNTER — OUTPATIENT (OUTPATIENT)
Dept: OUTPATIENT SERVICES | Facility: HOSPITAL | Age: 43
LOS: 1 days | End: 2023-10-13
Payer: MEDICAID

## 2023-10-13 ENCOUNTER — APPOINTMENT (OUTPATIENT)
Dept: OBGYN | Facility: CLINIC | Age: 43
End: 2023-10-13
Payer: MEDICAID

## 2023-10-13 VITALS
DIASTOLIC BLOOD PRESSURE: 74 MMHG | WEIGHT: 227.19 LBS | BODY MASS INDEX: 37.81 KG/M2 | SYSTOLIC BLOOD PRESSURE: 110 MMHG

## 2023-10-13 DIAGNOSIS — Z71.3 DIETARY COUNSELING AND SURVEILLANCE: ICD-10-CM

## 2023-10-13 PROCEDURE — 99213 OFFICE O/P EST LOW 20 MIN: CPT

## 2023-10-13 RX ORDER — VALACYCLOVIR 1 G/1
1 TABLET, FILM COATED ORAL
Qty: 90 | Refills: 0 | Status: ACTIVE | COMMUNITY
Start: 2022-04-15 | End: 1900-01-01

## 2023-10-18 LAB
ANTI-MUELLERIAN HORMONE: 0.03 NG/ML
DHEA-SULFATE, SERUM: 32 UG/DL
ESTRADIOL SERPL-MCNC: 30 PG/ML
FSH SERPL-MCNC: 21.2 IU/L
PROLACTIN SERPL-MCNC: 9.1 NG/ML
TESTOST SERPL-MCNC: 16.3 NG/DL
TSH SERPL-ACNC: 1.24 UIU/ML

## 2023-10-19 DIAGNOSIS — N91.1 SECONDARY AMENORRHEA: ICD-10-CM

## 2023-10-30 DIAGNOSIS — Z01.419 ENCOUNTER FOR GYNECOLOGICAL EXAMINATION (GENERAL) (ROUTINE) WITHOUT ABNORMAL FINDINGS: ICD-10-CM

## 2023-11-07 ENCOUNTER — APPOINTMENT (OUTPATIENT)
Dept: INFECTIOUS DISEASE | Facility: CLINIC | Age: 43
End: 2023-11-07

## 2023-11-07 ENCOUNTER — LABORATORY RESULT (OUTPATIENT)
Age: 43
End: 2023-11-07

## 2023-11-07 ENCOUNTER — OUTPATIENT (OUTPATIENT)
Dept: OUTPATIENT SERVICES | Facility: HOSPITAL | Age: 43
LOS: 1 days | End: 2023-11-07
Payer: MEDICAID

## 2023-11-07 VITALS
RESPIRATION RATE: 14 BRPM | BODY MASS INDEX: 37.82 KG/M2 | WEIGHT: 227 LBS | TEMPERATURE: 98.7 F | DIASTOLIC BLOOD PRESSURE: 69 MMHG | HEIGHT: 65 IN | SYSTOLIC BLOOD PRESSURE: 103 MMHG | HEART RATE: 90 BPM | OXYGEN SATURATION: 98 %

## 2023-11-07 DIAGNOSIS — J12.9 VIRAL PNEUMONIA, UNSPECIFIED: ICD-10-CM

## 2023-11-07 DIAGNOSIS — E11.9 TYPE 2 DIABETES MELLITUS WITHOUT COMPLICATIONS: ICD-10-CM

## 2023-11-07 DIAGNOSIS — E66.9 OBESITY, UNSPECIFIED: ICD-10-CM

## 2023-11-07 DIAGNOSIS — B20 HUMAN IMMUNODEFICIENCY VIRUS [HIV] DISEASE: ICD-10-CM

## 2023-11-07 PROCEDURE — 99214 OFFICE O/P EST MOD 30 MIN: CPT

## 2023-11-08 ENCOUNTER — APPOINTMENT (OUTPATIENT)
Dept: ENDOCRINOLOGY | Facility: CLINIC | Age: 43
End: 2023-11-08

## 2023-11-08 ENCOUNTER — OUTPATIENT (OUTPATIENT)
Dept: OUTPATIENT SERVICES | Facility: HOSPITAL | Age: 43
LOS: 1 days | End: 2023-11-08
Payer: MEDICAID

## 2023-11-08 ENCOUNTER — EMERGENCY (EMERGENCY)
Facility: HOSPITAL | Age: 43
LOS: 0 days | Discharge: HOME | End: 2023-11-08
Attending: EMERGENCY MEDICINE
Payer: MEDICAID

## 2023-11-08 VITALS
WEIGHT: 225.09 LBS | SYSTOLIC BLOOD PRESSURE: 121 MMHG | DIASTOLIC BLOOD PRESSURE: 58 MMHG | OXYGEN SATURATION: 96 % | HEART RATE: 71 BPM | RESPIRATION RATE: 17 BRPM | TEMPERATURE: 98 F

## 2023-11-08 VITALS
WEIGHT: 225 LBS | BODY MASS INDEX: 37.44 KG/M2 | HEART RATE: 106 BPM | SYSTOLIC BLOOD PRESSURE: 109 MMHG | DIASTOLIC BLOOD PRESSURE: 73 MMHG

## 2023-11-08 DIAGNOSIS — R05.9 COUGH, UNSPECIFIED: ICD-10-CM

## 2023-11-08 DIAGNOSIS — Z00.00 ENCOUNTER FOR GENERAL ADULT MEDICAL EXAMINATION WITHOUT ABNORMAL FINDINGS: ICD-10-CM

## 2023-11-08 DIAGNOSIS — J06.9 ACUTE UPPER RESPIRATORY INFECTION, UNSPECIFIED: ICD-10-CM

## 2023-11-08 DIAGNOSIS — E11.9 TYPE 2 DIABETES MELLITUS WITHOUT COMPLICATIONS: ICD-10-CM

## 2023-11-08 DIAGNOSIS — B20 HUMAN IMMUNODEFICIENCY VIRUS [HIV] DISEASE: ICD-10-CM

## 2023-11-08 DIAGNOSIS — Z79.84 LONG TERM (CURRENT) USE OF ORAL HYPOGLYCEMIC DRUGS: ICD-10-CM

## 2023-11-08 DIAGNOSIS — B97.89 OTHER VIRAL AGENTS AS THE CAUSE OF DISEASES CLASSIFIED ELSEWHERE: ICD-10-CM

## 2023-11-08 DIAGNOSIS — Z79.899 OTHER LONG TERM (CURRENT) DRUG THERAPY: ICD-10-CM

## 2023-11-08 DIAGNOSIS — R06.02 SHORTNESS OF BREATH: ICD-10-CM

## 2023-11-08 DIAGNOSIS — J02.9 ACUTE PHARYNGITIS, UNSPECIFIED: ICD-10-CM

## 2023-11-08 PROCEDURE — 99283 EMERGENCY DEPT VISIT LOW MDM: CPT | Mod: 25

## 2023-11-08 PROCEDURE — 94640 AIRWAY INHALATION TREATMENT: CPT

## 2023-11-08 PROCEDURE — 99284 EMERGENCY DEPT VISIT MOD MDM: CPT

## 2023-11-08 PROCEDURE — 99214 OFFICE O/P EST MOD 30 MIN: CPT

## 2023-11-08 PROCEDURE — 71045 X-RAY EXAM CHEST 1 VIEW: CPT

## 2023-11-08 PROCEDURE — 71045 X-RAY EXAM CHEST 1 VIEW: CPT | Mod: 26

## 2023-11-08 RX ORDER — IPRATROPIUM/ALBUTEROL SULFATE 18-103MCG
3 AEROSOL WITH ADAPTER (GRAM) INHALATION ONCE
Refills: 0 | Status: COMPLETED | OUTPATIENT
Start: 2023-11-08 | End: 2023-11-08

## 2023-11-08 RX ORDER — DEXAMETHASONE 0.5 MG/5ML
10 ELIXIR ORAL ONCE
Refills: 0 | Status: COMPLETED | OUTPATIENT
Start: 2023-11-08 | End: 2023-11-08

## 2023-11-08 RX ORDER — ACETAMINOPHEN 500 MG
650 TABLET ORAL ONCE
Refills: 0 | Status: COMPLETED | OUTPATIENT
Start: 2023-11-08 | End: 2023-11-08

## 2023-11-08 RX ORDER — DULAGLUTIDE 1.5 MG/.5ML
1.5 INJECTION, SOLUTION SUBCUTANEOUS
Qty: 1 | Refills: 2 | Status: DISCONTINUED | COMMUNITY
Start: 2023-02-02 | End: 2023-11-08

## 2023-11-08 RX ORDER — AZITHROMYCIN 250 MG/1
250 TABLET, FILM COATED ORAL
Qty: 1 | Refills: 0 | Status: ACTIVE | COMMUNITY
Start: 2023-11-07 | End: 1900-01-01

## 2023-11-08 RX ORDER — PIOGLITAZONE HYDROCHLORIDE 30 MG/1
30 TABLET ORAL
Qty: 90 | Refills: 2 | Status: ACTIVE | COMMUNITY
Start: 2023-01-27 | End: 1900-01-01

## 2023-11-08 RX ORDER — ALBUTEROL 90 UG/1
2 AEROSOL, METERED ORAL
Qty: 1 | Refills: 0
Start: 2023-11-08

## 2023-11-08 RX ORDER — BICTEGRAVIR SODIUM, EMTRICITABINE, AND TENOFOVIR ALAFENAMIDE FUMARATE 30; 120; 15 MG/1; MG/1; MG/1
1 TABLET ORAL ONCE
Refills: 0 | Status: COMPLETED | OUTPATIENT
Start: 2023-11-08 | End: 2023-11-08

## 2023-11-08 RX ADMIN — Medication 3 MILLILITER(S): at 12:12

## 2023-11-08 RX ADMIN — Medication 650 MILLIGRAM(S): at 12:13

## 2023-11-08 RX ADMIN — Medication 10 MILLIGRAM(S): at 12:12

## 2023-11-08 RX ADMIN — Medication 3 MILLILITER(S): at 12:13

## 2023-11-08 RX ADMIN — BICTEGRAVIR SODIUM, EMTRICITABINE, AND TENOFOVIR ALAFENAMIDE FUMARATE 1 TABLET(S): 30; 120; 15 TABLET ORAL at 14:15

## 2023-11-08 NOTE — ED PROVIDER NOTE - NSFOLLOWUPCLINICS_GEN_ALL_ED_FT
[facility]  Medicine  [address], [place]   Phone: [phone]  Fax:

## 2023-11-08 NOTE — ED PROVIDER NOTE - PHYSICAL EXAMINATION
VITAL SIGNS: I have reviewed nursing notes and confirm.  CONSTITUTIONAL: Patient is in no acute distress.  SKIN: Skin exam is warm and dry, no acute rash.  HEAD: Normocephalic; atraumatic.  EYES: EOM intact; conjunctiva and sclera clear.  ENT: No nasal discharge. Oropharynx non erythematous, no exudate. Uvula midline. Tolerating secretions, clear speech.  CARD: S1, S2 normal; Regular rate and rhythm.  RESP: Clear to auscultation bilaterally. +Faint bilateral expiratory wheezes. No rales or rhonchi.  ABD: Soft; non-distended; non-tender.   EXT: Normal ROM. No edema.   NEURO: Alert, oriented. Grossly unremarkable. No focal deficits.  PSYCH: Cooperative, appropriate.

## 2023-11-08 NOTE — ED PROVIDER NOTE - NSFOLLOWUPINSTRUCTIONS_ED_ALL_ED_FT
Follow up with your primary care doctor in 2-3 days.  Take cough medication as prescribed. Use inhaler as needed for chest tightness.  Take tylenol or ibuprofen as needed for sore throat. Return to the hospital if any symptoms change or worsen.        Infección de las vías respiratorias superiores en adultos    Upper Respiratory Infection, Adult      Manjinder infección de las vías respiratorias superiores (IVRS) afecta la nariz, la garganta y las vías respiratorias superiores que llegan a los pulmones. El tipo más común de IVRS suele conocerse joan el resfrío común. Las IVRS generalmente mejoran solas, sin tratamiento médico.      ¿Cuáles son las causas?    La causa de las IVRS es un germen (virus). Puede contraer estos gérmenes de las siguientes maneras:  •Al aspirar las gotitas que manjinder persona infectada elimina al toser o estornudar.      •Al tocar algo que tiene el germen (está contaminado) y luego tocarse la boca, la nariz o los ojos.        ¿Qué incrementa el riesgo?    Es más propenso a contraer manjinder IVRS si:  •Es muy pequeño o de edad muy avanzada.      •Tiene contacto cercano con otros, joan en el trabajo, la escuela o un centro de atención médica.      •Fuma.      •Tiene manjinder enfermedad cardíaca o pulmonar a carrington plazo (crónica).      •Tiene debilitado el sistema encargado de combatir las enfermedades (sistema inmunitario).      •Tiene asma o alergias nasales.      •Tiene mucho estrés.      •Tiene un déficit nutricional.        ¿Cuáles son los signos o síntomas?    •Secreción nasal o nariz tapada (congestión).      •Tos.      •Estornudos.      •Dolor de garganta.      •Dolor de bindu.      •Sensación de cansancio (fatiga).      •Fiebre.      •No querer comer tanto joan lo hace habitualmente.      •Dolor en la frente, detrás de los ojos y por encima de los pómulos (dolor sinusal).      •Whitney musculares.      •Enrojecimiento o irritación de los ojos.      •Presión en los oídos o la steve.        ¿Cómo se trata?    Las IVRS generalmente mejoran por sí solas en un período de entre 7 y 10 días. Los medicamentos no curan las IVRS, rhiannon el médico puede recomendarle ciertos medicamentos para ayudar a aliviar los síntomas, joan por ejemplo:  •Medicamentos para la tos de venta miki.      •Medicamentos para reducir la tos (antitusivos). La tos es un tipo de defensa contra las infecciones que ayuda a despejar la nariz, la garganta, la tráquea y los pulmones (el sistema respiratorio). [place] estos medicamentos solamente joan se lo haya indicado el médico.      •Medicamentos para bajar la fiebre.        Siga estas instrucciones en shah casa:    Actividad     •Descanse todo lo que sea necesario.    •Si tiene fiebre, permanezca en shah casa, sin ir al trabajo o a la escuela, hasta que ya no tenga fiebre, o hasta que el médico le indique que puede regresar al trabajo o a la escuela.  •Debe permanecer en shah casa hasta que ya no pueda propagar (contagiar) la infección.      •Es posible que el médico le indique que use manjinder mascarilla para tener menos riesgo de propagar la infección.        Para aliviar los síntomas     •Enjuáguese la boca frecuentemente con manjinder mezcla de agua con sal. Para preparar agua con sal, disuelva de ½ a 1 cucharadita (de 3 a 6 g) de sal en 1 taza (237 ml) de agua tibia.      •Use un humidificador de aire frío para agregar humedad al aire. [place] puede ayudarlo a que respire mejor.        Comida y bebida   Three cups showing dark yellow, yellow, and pale yellow pee.   •Ene suficiente líquido para mantener la orina de color amarillo pálido.      •[place] sopas y caldos transparentes.        Instrucciones generales   A sign showing that a person should not smoke.   •Use los medicamentos de venta miki y los recetados solamente joan se lo haya indicado el médico.      • No fume ni consuma ningún producto que contenga nicotina o tabaco. Si necesita ayuda para dejar de fumar, consulte al médico.      •Evite estar cerca de personas que fuman (evite el humo ambiental de tabaco).      •Manténgase al día con todas las vacunas (inmunizaciones) y aplíquese la vacuna contra la gripe todos los años.      •Concurra a todas las visitas de seguimiento.        Cómo evitar contagiar la infección a otros   Washing hands with soap and water.   •Lávese las sina con agua y jabón dhara al menos 20 segundos. Use un desinfectante para sina si no dispone de agua y jabón.      •Evite tocarse la boca, la steve, los ojos o la nariz.      •Tosa o estornude en un pañuelo de papel o sobre shah manga o codo. No tosa o estornude al aire ni se cubra la boca o la nariz con la mano.        Comuníquese con un médico si:    •Siente que empeora o que no mejora.    •Tiene alguno de estos síntomas:  •Fiebre o escalofríos.      •Mucosidad color marrón o raoul en la nariz.      •Líquido amarillento o amarronado (secreción)que le sale de la nariz.      •Dolor en la steve, especialmente al inclinarse hacia adelante.      •Ganglios del camila inflamados.      •Dolor al tragar.      •Zonas marisabel en la parte de atrás de la garganta.          Solicite ayuda de inmediato si:    •La falta de aire empeora.    •Los siguientes síntomas son muy intensos o constantes:  •Dolor de bindu.      •Dolor de oído.      •Dolor en la frente, detrás de los ojos y por encima de los pómulos (dolor sinusal).      •Dolor de pecho.      •Tiene manjinder enfermedad pulmonar prolongada (crónica) junto con cualquiera de estos síntomas:  •Emitir sonidos de silbidos agudos al respirar, más a menudo al exhalar (sibilancias).      •Tos prolongada (más de 14 días).      •Tos con abisai.      •Cambio en la mucosidad habitual.        •Tiene rigidez en el camila.    •Tiene cambios en:  •La visión.      •La audición.      •El razonamiento.      •El estado de ánimo.        Estos síntomas pueden indicar manjinder emergencia. Solicite ayuda de inmediato. Llame al 911.   • No espere a aris si los síntomas desaparecen.       • No conduzca por randy propios medios hasta el hospital.         Resumen    •Manjinder infección de las vías respiratorias superiores (IVRS) es causada por un germen (virus). El tipo más común de IVRS suele conocerse joan el resfrío común.      •Manjinder IVRS suele mejorar en el transcurso de 7 a 10 días.      •Use los medicamentos de venta miki y los recetados solamente joan se lo haya indicado el médico.      Esta información no tiene joan fin reemplazar el consejo del médico. Asegúrese de hacerle al médico cualquier pregunta que tenga.      Document Revised: 08/15/2022 Document Reviewed: 08/15/2022    Elsevier Patient Education © 2022 [employer]

## 2023-11-08 NOTE — ED PROVIDER NOTE - CLINICAL SUMMARY MEDICAL DECISION MAKING FREE TEXT BOX
evaluated for ccough and sore throat, cxr without infitrate, given nebs and steroids with improvement in symptoms.

## 2023-11-08 NOTE — ED ADULT TRIAGE NOTE - ACCOMPANIED BY
Pre-Operative H & P     CC:  Preoperative exam to assess for increased cardiopulmonary risk while undergoing surgery and anesthesia.    Date of Encounter: 7/5/2023  Primary Care Physician:  Jerry Toledo     Reason for visit:   Encounter Diagnoses   Name Primary?     Pre-op evaluation Yes     Corneal scar and opacity      Bullous keratopathy of right eye        HPI  Ravi Stanley is a 63 year old male who presents for pre-operative H & P in preparation for  Procedure Information     Case: [id number] Date/Time: 07/11/23 0800    Procedure: KERATOPLASTY, PENETRATING - RIGHT EYE (Right: Eye)    Anesthesia type: General    Diagnosis:       Corneal scar and opacity [H17.9]      Bullous keratopathy of right eye [H18.11]    Pre-op diagnosis:       Corneal scar and opacity [H17.9]      Bullous keratopathy of right eye [H18.11]    Location: [place] [address] / [facility]    Providers: Domingo Roche MD          Patient is being evaluated for comorbid conditions of hypertension, hyperlipidemia, coronary artery disease status post stent placement x2, type 2 diabetes, erectile dysfunction, latent TB.    He was evaluated by Dr. Roche on 7/3/2023 for corneal scar and opacity as well as bullous keratopathy of the right eye. He is now scheduled for surgery as above.    History is obtained from the patient and chart review    Hx of abnormal bleeding or anti-platelet use: ASA 81 mg      Past Medical History  Past Medical History:   Diagnosis Date     CAD (coronary artery disease)      Diabetes mellitus (H)     2007     HLD (hyperlipidemia)      HTN (hypertension)      Nonsenile cataract      Primary open angle glaucoma      TB lung, latent      Tear film insufficiency, unspecified      Trichiasis of eyelid without entropion        Past Surgical History  Past Surgical History:   Procedure Laterality Date     COLONOSCOPY  2-18-13    Normal, repeat Colonoscopy in 5-10 yrs     EXCHANGE  INTRAOCULAR LENS IMPLANT Left 2/5/2019    Procedure: Intraocular Lens Explantation;  Surgeon: Domingo Roche MD;  [facility]     EYE SURGERY      DALK OS 7/14/2015     GLAUCOMA SURGERY Left 2012    [facility]     GLAUCOMA SURGERY Left 3/15/2016    DIODE     GLAUCOMA SURGERY Left 03/21/2017     IMPLANT VALVE EYE Left 3/22/2023    Procedure: INSERTION, BAERVELDT IMPLANT, EYE  LEFT;  Surgeon: Cisco Woodall MD;  Location: [facility] OR     IMPLANT VALVE EYE Right 4/12/2023    Procedure: INSERTION, BAERVELDT MPLANT, RIGHT EYE;  Surgeon: Cisco Woodall MD;  Location: [facility] OR     KERATOPLASTY DESCEMETS STRIPPING ENDOTHELIAL (DSEK) Left 10/5/2021    Procedure: DESCEMET'S STRIPPING ENDOTHELIAL KERATOPLASTY (DSEK) - left eye;  Surgeon: Domingo Roche MD;  [facility]     KERATOPLASTY PENETRATING Left 9/1/2015    Procedure: KERATOPLASTY PENETRATING;  Surgeon: Domingo Roche MD;  Location: [facility]     KERATOPLASTY PENETRATING Left 2/5/2019    Procedure: Left Eye Penetrating Keratoplasty;  Surgeon: Domingo Roche MD;  [facility]     KERATOPLASTY PENETRATING Left 10/12/2021    Procedure: KERATOPLASTY, PENETRATING LEFT;  Surgeon: Domingo Roche MD;  Location: [facility]     KERATOTOMY ARCUATE WITH FEMTOSECOND LASER/IMAGING FOR ATIOL Left 3/1/2016    Procedure: KERATOTOMY ARCUATE WITH FEMTOSECOND LASER/IMAGING FOR ATIOL;  Surgeon: Domingo Roche MD;  Location: [facility]     LASER SURGERY OF EYE  11/4/2014    DIODE LE     PHACOEMULSIFICATION CLEAR CORNEA WITH STANDARD INTRAOCULAR LENS IMPLANT Left 3/1/2016    Procedure: PHACOEMULSIFICATION CLEAR CORNEA WITH STANDARD INTRAOCULAR LENS IMPLANT;  Surgeon: Domingo Roche MD;  Location: [facility]     TX ANESTH,CORNEAL TRANSPLANT Left      RECONSTRUCT ANTERIOR CHAMBER Left 2/5/2019    Procedure: Pupiloplasty;  Surgeon: Domingo Roche MD;  Location:  OR     SCLERAL FIXATION INTRAOCULAR LENS IMPLANT  2/5/2019    Procedure: Scleral Fixation  Intraocular Lens Implant;  Surgeon: Domingo Rohce MD;  [facility]     VITRECTOMY ANTERIOR Left 2/5/2019    Procedure: Anterior Vitrectomy;  Surgeon: Domingo Roche MD;  Location:  OR     [facility] ANESTH,CORNEAL TRANSPLANT Left 03/21/2017       Prior to Admission Medications  Current Outpatient Medications   Medication Sig Dispense Refill     artificial saliva (BIOTENE DRY MOUTHWASH) LIQD liquid Swish and spit 15 mLs in mouth 4 times daily 237 mL 3     aspirin 81 MG tablet Take 1 tablet by mouth every morning       atorvastatin (LIPITOR) 40 MG tablet Take 1 tablet (40 mg) by mouth daily (Patient taking differently: Take 40 mg by mouth every morning) 90 tablet 3     brimonidine (ALPHAGAN) 0.2 % ophthalmic solution Place 1 drop into both eyes 3 times daily 20 mL 4     carboxymethylcellulose (REFRESH PLUS) 0.5 % SOLN ophthalmic solution Place 1 drop Into the left eye 4 times daily 30 each 11     Carboxymethylcellulose Sod PF (LUBRICATING PLUS EYE DROPS) 0.5 % SOLN ophthalmic solution Place 1 drop Into the left eye 4 times daily 70 each 3     cholecalciferol (VITAMIN D) 1000 UNIT tablet Take 1 tablet by mouth 2 times daily. 100 tablet 11     dorzolamide-timolol (COSOPT) 2-0.5 % ophthalmic solution Place 1 drop into both eyes 2 times daily 10 mL 4     fluticasone (FLONASE) 50 MCG/ACT spray Spray 1-2 sprays into both nostrils daily (Patient taking differently: Spray 1-2 sprays into both nostrils as needed) 1 Bottle 11     glipiZIDE (GLUCOTROL) 10 MG tablet Take 1 tablet (10 mg) by mouth 2 times daily (before meals) Due for office visit 60 tablet 1     JARDIANCE 25 MG TABS tablet Take 25 mg by mouth every morning       latanoprost (XALATAN) 0.005 % ophthalmic solution Place 2 drops into both eyes At Bedtime Hold second drop if first drop effective (reaches eye) 2.5 mL 11     lisinopril (PRINIVIL/ZESTRIL) 5 MG tablet Take 1 tablet (5 mg) by mouth daily (Patient taking differently: Take 5 mg by mouth every  morning) 90 tablet 1     metFORMIN (GLUCOPHAGE) 1000 MG tablet Take 1 tablet (1,000 mg) by mouth 2 times daily (with meals) 180 tablet 1     mirabegron (MYRBETRIQ) 50 MG 24 hr tablet Take 1 tablet (50 mg) by mouth daily (Patient taking differently: Take 50 mg by mouth every morning) 90 tablet 3     Mouthwashes (MOUTHWASH/GARGLE) LIQD Swish and swallow 10 ml daily before bedtime. 1 Bottle 99     ofloxacin (OCUFLOX) 0.3 % ophthalmic solution Place 1 drop Into the left eye 4 times daily 5 mL 3     prednisoLONE acetate (PRED FORTE) 1 % ophthalmic suspension As of 05/02/2023: use 1 drop 4 times a day in the right eye and use 1 drop 2 times a day in the left eye. Follow your ophthalmologist's recommendations for dose adjustment. 15 mL 5     sitagliptin (JANUVIA) 100 MG tablet Take 1 tablet (100 mg) by mouth daily (Patient taking differently: Take 100 mg by mouth every morning) 90 tablet 1     sodium chloride () 5 % ophthalmic ointment Place 1 Application (0.5 g) into the right eye At Bedtime Please inform patient if cheaper over the counter. 3.5 g 4     tacrolimus (PROTOPIC) 0.03 % external ointment Apply topically At Bedtime Apply left eye at bed time. 30 g 4     tadalafil (CIALIS) 20 MG tablet Take one tab every other day as needed for sex 90 tablet 0     blood glucose (NO BRAND SPECIFIED) lancets standard Use to test blood sugar 1 times daily or as directed. 1 Box 11     blood glucose monitoring (NO BRAND SPECIFIED) test strip Use to test blood sugars 2 x a day 100 strip 11     continuous blood glucose monitoring (FREESTYLE EBONI) sensor For use with Freestyle Eboni Flash  for continuous monitioring of blood glucose levels. Replace sensor every 10 days. 3 each 11     order for DME Equipment being ordered: bp monitor 1 each 0     order for DME Equipment being ordered: home blood pressure device 1 Units 0     predniSONE (DELTASONE) 10 MG tablet Starting 3 days before surgery (Saturday, July 8), take 30 mg  (3 pills) each AM. On the day of surgery, take the full dose with your first meal after surgery. Then continue 30 mg (3 pills) each AM until the next visit with Dr. Jennings or your other Doctors give you other instructions. (Patient not taking: Reported on 7/5/2023) 60 tablet 0     RYBELSUS 14 MG tablet Take 14 mg by mouth daily         Allergies  No Known Allergies    Social History  Social History     Socioeconomic History     Marital status:      Spouse name: Not on file     Number of children: Not on file     Years of education: Not on file     Highest education level: Not on file   Occupational History     Not on file   Tobacco Use     Smoking status: Former     Types: Cigarettes     Quit date: 10/10/2012     Years since quitting: 10.7     Smokeless tobacco: Never   Substance and Sexual Activity     Alcohol use: No     Drug use: No     Sexual activity: Yes     Partners: Female   Other Topics Concern     Parent/sibling w/ CABG, MI or angioplasty before 65F 55M? No   Social History Narrative     Not on file     Social Determinants of Health     Financial Resource Strain: Not on file   Food Insecurity: Not on file   Transportation Needs: Not on file   Physical Activity: Not on file   Stress: Not on file   Social Connections: Not on file   Intimate Partner Violence: Not on file   Housing Stability: Not on file       Family History  Family History   Problem Relation Age of Onset     Diabetes Mother      Glaucoma No family hx of      Macular Degeneration No family hx of      Hypertension No family hx of      Anesthesia Reaction No family hx of      Venous thrombosis No family hx of        Review of Systems  The complete review of systems is negative other than noted in the HPI or here.   Anesthesia Evaluation   Pt has had prior anesthetic.     No history of anesthetic complications       ROS/MED HX  ENT/Pulmonary:     (+) MAK risk factors, hypertension, tobacco use, Past use,  (-) asthma and recent URI    Neurologic:  - neg neurologic ROS     Cardiovascular:     (+) Dyslipidemia hypertension--CAD --stent-2017. 2 Drug Eluting Stent. Previous cardiac testing   Echo: Date: Results:    Stress Test: Date: 2017 Results:  Impression:  1. Abnormal myocardial SPECT study.  2. Large, reversible perfusion defect in the inferolateral myocardial  segments likely consistent with ischemia in the left circumflex  territory.  3. Normal left ventricular systolic function with a left ventricular  ejection fraction of 72%.  4. Suboptimal functional capacity for age.  5. No prior studies available for comparison.  6. Results communicated to the ordering provider via Epic message and  text message.  ECG Reviewed: Date: 10/6/2020 Results:  Narrative  Performed by [facility]  Sinus rhythm with 1st degree A-V block   Nonspecific T wave abnormality   Abnormal ECG   When compared with ECG of 23-OCT-2018 15:37,   No significant change was found     Cath:  Date: 2017 Results:  1.  Two-vessel CAD RCA and LCA  2.  Successful balloon and drug-eluting stents to the proximal LCx and OM2  3.  Closure of the RFA arteriotomy with an Angio-Seal device (-) angina, MOORE, irregular heartbeat/palpitations and angina   METS/Exercise Tolerance: >4 METS Comment: Running on treadmill 20 minutes daily   Hematologic:  - neg hematologic  ROS     Musculoskeletal:  - neg musculoskeletal ROS     GI/Hepatic:  - neg GI/hepatic ROS     Renal/Genitourinary: Comment: ED   (-) nephrolithiasis   Endo:     (+) type II DM, Last HgA1c: 7.7, date: 6/15/2023, Not using insulin, - not using insulin pump. not previously admitted for DM/DKA. Chronic steroid usage for Other. Date most recently used: corneal scarring, bullous keratopathy.  (-) thyroid disease   Psychiatric/Substance Use:  - neg psychiatric ROS     Infectious Disease: Comment: Latent TB   (-) Recent Fever   Malignancy:  - neg malignancy ROS     Other:  - neg other ROS          /75 (BP Location: Right arm, Patient  "Position: Sitting, Cuff Size: Adult Regular)   Pulse 82   Temp 98.2  F (36.8  C) (Oral)   Resp 16   Ht 1.753 m (5' 9\")   Wt 85.4 kg (188 lb 3.2 oz)   SpO2 99%   BMI 27.79 kg/m      Physical Exam   Constitutional: Pleasant, well-appearing male, no apparent distress, and appears stated age.  Eyes:  Pupils equal and reactive to light, extra ocular muscles intact, sclera opaque.  HENT: Normocephalic and atraumatic, oral pharynx with moist mucus membranes, good dentition. No goiter appreciated.   Respiratory: Clear to auscultation bilaterally, no crackles or wheezing.  Cardiovascular: Regular rate and rhythm, normal S1 and S2, and no murmur noted.  Carotids +2, no bruits. No edema. Palpable pulses to radial  DP and PT arteries.   GI: Normal bowel sounds, soft, non-distended, non-tender, no masses palpated, no hepatosplenomegaly.  Lymph/Hematologic: No cervical lymphadenopathy and no supraclavicular lymphadenopathy.  Genitourinary:  Deferred  Skin: Warm and dry.  No rashes on exposed skin   Musculoskeletal: Full ROM of neck. There is no redness, warmth, or swelling of the visible joints. Gross motor strength is normal.    Neurologic: Awake, alert, oriented to name, place and time. Cranial nerves II-XII are grossly intact. Gait is normal.   Neuropsychiatric: Calm, cooperative. Normal affect.       Prior Labs/Diagnostic Studies   All labs and imaging personally reviewed - [facility]    EKG/ stress test - if available please see in ROS above   No results found.    The patient's records and results personally reviewed by this provider.     Outside records reviewed from: Care Everywhere    LAB/DIAGNOSTIC STUDIES TODAY:  None    Assessment  Ravi Stanley is a 63 year old male seen as a PAC referral for risk assessment and optimization for anesthesia.    Plan/Recommendations  Pt will be optimized for the proposed procedure.  See below for details on the assessment, risk, and " "preoperative recommendations    NEUROLOGY  - No history of TIA, CVA or seizure    -Post Op delirium risk factors:  No risk identified    HEENT  - Corneal scar/bullous keratopathy; surgery as scheduled above.    - Patient instructed by surgical team to start prednisone prior to surgery. A copy of these instructions was provided in written form for the patient today.  - No current airway concerns.  Will need to be reassessed day of surgery.  Mallampati: I  TM: > 3    CARDIAC  - No history of Afib  - CAD s/p VELVET x 2 in 2017, medically managed with ASA 81 mg, lipitor, lisinopril. Physically active, running on a treadmill for 20 minutes daily without cardiac symptoms.  - HTN/HLD: well controlled on current medication regimen    - METS (Metabolic Equivalents)  Patient performs 4 or more METS exercise without symptoms            Total Score: 0      RCRI-Low risk: Class 2 0.9% complication rate            Total Score: 1    RCRI: CAD        PULMONARY    MAK Medium Risk            Total Score: 3    MAK: Hypertension    MAK: Over 50 ys old    MAK: Male      - Denies asthma or inhaler use  - Tobacco History    History   Smoking Status     Former     Types: Cigarettes     Quit date: 10/10/2012   Smokeless Tobacco     Never       GI    PONV Low Risk  Total Score: 1           1 AN PONV: Patient is not a current smoker        /RENAL  - Baseline Creatinine  WNL    ENDOCRINE    - BMI: Estimated body mass index is 27.79 kg/m  as calculated from the following:    Height as of this encounter: 1.753 m (5' 9\").    Weight as of this encounter: 85.4 kg (188 lb 3.2 oz).  Overweight (BMI 25.0-29.9)  - Diabetes  Hemoglobin A1C (%)   Date Value   02/20/2018 7.9 (H)     Diabetes Mellitus, Type 2, non-insulin dependent.  Hold Jardiance 3 days prior to surgery. Patient is also prescribed Rybelsus, he states that his last dose was on 6/26. He did  his prescription for 14 mg tablets, but states he has not started it yet. Recommend close " monitoring of the patient's blood glucose levels throughout the perioperative period.    HEME  VTE Low Risk 0.5%            Total Score: 3    VTE: Greater than 59 yrs old    VTE: Male      - Platelet disfunction secondary to Aspirin (Val, many others). Hold DOS.    Different anesthesia methods/types have been discussed with the patient, but they are aware that the final plan will be decided by the assigned anesthesia provider on the date of service.    The patient is optimized for their procedure. AVS with information on surgery time/arrival time, meds and NPO status given by nursing staff. No further diagnostic testing indicated.      On the day of service:     Prep time: 20 minutes  Visit time: 15 minutes  Documentation time: 20 minutes  ------------------------------------------  Total time: 55 minutes      Nicole Guerrero PA-C  Preoperative Assessment Center  [facility]  Phone: [phone]  Fax: [phone]   Self

## 2023-11-08 NOTE — ED PROVIDER NOTE - OBJECTIVE STATEMENT
43 yo F with PMH of HIV on Biktarvy (undetectable per pt), and DM on Mounjaro presents to the ER with c.o sore throat, myalgias, productive cough and shortness of breath x 6 days. Cough is with green sputum. Reports shortness of breath is constant. States at night she hears whistling in her lungs. Has no history of asthma. Endorses sick contact of a relative who also has a cough. Pt is concerned for pneumonia, requesting CXR. Denies fever, chills, rhinorrhea, NVD, known COVID exposure or other complaints.

## 2023-11-08 NOTE — ED PROVIDER NOTE - ATTENDING APP SHARED VISIT CONTRIBUTION OF CARE
cough, congestion, sore throat for 3 days, no vomiting or diarrhea. no difficulty swallowing. tried OTC meds without improvement. HIV + and diabetes with nml fs and viral load undetectable and cd4 count high. on exam throat is nml without trismus, no lymphadenopathy. lungs with wheezing, abd soft, plan is to treat with nebs, steroids, tylenol

## 2023-11-08 NOTE — ED PROVIDER NOTE - NS ED ROS FT
Review of Systems:  	•	CONSTITUTIONAL - no fever, no diaphoresis, no chills  	•	SKIN - no rash  	•	HEMATOLOGIC - no bleeding, no bruising  	•	EYES - no eye pain, no blurry vision  	•	ENT - no congestion, +sore throat  	•	RESPIRATORY - +shortness of breath, +cough  	•	CARDIAC - no chest pain, no palpitations  	•	GI - no abd pain, no nausea, no vomiting, no diarrhea   	•	GENITO-URINARY - no dysuria; no hematuria   	•	MUSCULOSKELETAL - no joint paint, no swelling, no redness  	•	NEUROLOGIC - no weakness, no headache   	•	PSYCH - no anxiety, no depression  	All other ROS are negative except as documented in HPI.

## 2023-11-10 ENCOUNTER — NON-APPOINTMENT (OUTPATIENT)
Age: 43
End: 2023-11-10

## 2023-11-12 LAB
ALBUMIN SERPL ELPH-MCNC: 4.4 G/DL
ALP BLD-CCNC: 107 U/L
ALT SERPL-CCNC: 32 U/L
ANION GAP SERPL CALC-SCNC: 10 MMOL/L
AST SERPL-CCNC: 32 U/L
BILIRUB SERPL-MCNC: 0.3 MG/DL
BUN SERPL-MCNC: 14 MG/DL
C TRACH RRNA SPEC QL NAA+PROBE: NOT DETECTED
CALCIUM SERPL-MCNC: 9.4 MG/DL
CHLORIDE SERPL-SCNC: 105 MMOL/L
CO2 SERPL-SCNC: 24 MMOL/L
CREAT SERPL-MCNC: 1 MG/DL
EGFR: 71 ML/MIN/1.73M2
GLUCOSE SERPL-MCNC: 93 MG/DL
HBV CORE IGG+IGM SER QL: NONREACTIVE
HBV SURFACE AB SER QL: NONREACTIVE
HBV SURFACE AG SER QL: NONREACTIVE
HCT VFR BLD CALC: 44.8 %
HCV AB SER QL: NONREACTIVE
HCV S/CO RATIO: 0.07 S/CO
HEPATITIS A IGG ANTIBODY: REACTIVE
HGB BLD-MCNC: 14.6 G/DL
HIV1 RNA # SERPL NAA+PROBE: <20 COPIES/ML
M TB IFN-G BLD-IMP: NEGATIVE
MCHC RBC-ENTMCNC: 32.6 G/DL
MCHC RBC-ENTMCNC: 33 PG
MCV RBC AUTO: 101.1 FL
N GONORRHOEA RRNA SPEC QL NAA+PROBE: NOT DETECTED
PLATELET # BLD AUTO: 269 K/UL
PMV BLD: 10.3 FL
POTASSIUM SERPL-SCNC: 4.6 MMOL/L
PROT SERPL-MCNC: 7.7 G/DL
QUANTIFERON TB PLUS MITOGEN MINUS NIL: 9.12 IU/ML
QUANTIFERON TB PLUS NIL: 0.1 IU/ML
QUANTIFERON TB PLUS TB1 MINUS NIL: 0.09 IU/ML
QUANTIFERON TB PLUS TB2 MINUS NIL: 0.02 IU/ML
RAPID RVP RESULT: DETECTED
RBC # BLD: 4.43 M/UL
RBC # FLD: 12.9 %
RSV RNA SPEC QL NAA+PROBE: DETECTED
SARS-COV-2 RNA PNL RESP NAA+PROBE: NOT DETECTED
SODIUM SERPL-SCNC: 139 MMOL/L
SOURCE AMPLIFICATION: NORMAL
T PALLIDUM AB SER QL IA: NEGATIVE
VIRAL LOAD INTERP: DETECTED
VIRAL LOAD LOG: <1.3 LOGCOPIES/ML
WBC # FLD AUTO: 5.36 K/UL

## 2023-11-15 DIAGNOSIS — E66.9 OBESITY, UNSPECIFIED: ICD-10-CM

## 2023-11-15 DIAGNOSIS — E11.9 TYPE 2 DIABETES MELLITUS WITHOUT COMPLICATIONS: ICD-10-CM

## 2023-11-22 ENCOUNTER — APPOINTMENT (OUTPATIENT)
Dept: OPHTHALMOLOGY | Facility: CLINIC | Age: 43
End: 2023-11-22

## 2023-12-11 LAB
CD16+CD56+ CELLS # BLD: 142 /UL
CD16+CD56+ CELLS NFR BLD: 10 %
CD19 CELLS NFR BLD: 274 /UL
CD3 CELLS # BLD: 973 /UL
CD3 CELLS NFR BLD: 70 %
CD3+CD4+ CELLS # BLD: 448 /UL
CD3+CD4+ CELLS NFR BLD: 32 %
CD3+CD4+ CELLS/CD3+CD8+ CLL SPEC: 0.86 RATIO
CD3+CD8+ CELLS # SPEC: 522 /UL
CD3+CD8+ CELLS NFR BLD: 37 %
CELLS.CD3-CD19+/CELLS IN BLOOD: 20 %

## 2023-12-22 ENCOUNTER — NON-APPOINTMENT (OUTPATIENT)
Age: 43
End: 2023-12-22

## 2023-12-22 ENCOUNTER — EMERGENCY (EMERGENCY)
Facility: HOSPITAL | Age: 43
LOS: 0 days | Discharge: HOME | End: 2023-12-22
Attending: EMERGENCY MEDICINE
Payer: MEDICAID

## 2023-12-22 VITALS
DIASTOLIC BLOOD PRESSURE: 67 MMHG | RESPIRATION RATE: 20 BRPM | TEMPERATURE: 98 F | HEART RATE: 68 BPM | OXYGEN SATURATION: 98 % | SYSTOLIC BLOOD PRESSURE: 115 MMHG | WEIGHT: 218.92 LBS

## 2023-12-22 DIAGNOSIS — R05.1 ACUTE COUGH: ICD-10-CM

## 2023-12-22 DIAGNOSIS — J02.9 ACUTE PHARYNGITIS, UNSPECIFIED: ICD-10-CM

## 2023-12-22 DIAGNOSIS — Z20.822 CONTACT WITH AND (SUSPECTED) EXPOSURE TO COVID-19: ICD-10-CM

## 2023-12-22 LAB
FLUAV AG NPH QL: SIGNIFICANT CHANGE UP
FLUAV AG NPH QL: SIGNIFICANT CHANGE UP
FLUBV AG NPH QL: SIGNIFICANT CHANGE UP
FLUBV AG NPH QL: SIGNIFICANT CHANGE UP
RSV RNA NPH QL NAA+NON-PROBE: SIGNIFICANT CHANGE UP
RSV RNA NPH QL NAA+NON-PROBE: SIGNIFICANT CHANGE UP
SARS-COV-2 RNA SPEC QL NAA+PROBE: SIGNIFICANT CHANGE UP
SARS-COV-2 RNA SPEC QL NAA+PROBE: SIGNIFICANT CHANGE UP

## 2023-12-22 PROCEDURE — 99284 EMERGENCY DEPT VISIT MOD MDM: CPT

## 2023-12-22 PROCEDURE — 71046 X-RAY EXAM CHEST 2 VIEWS: CPT | Mod: 26

## 2023-12-22 PROCEDURE — 71046 X-RAY EXAM CHEST 2 VIEWS: CPT

## 2023-12-22 PROCEDURE — 99283 EMERGENCY DEPT VISIT LOW MDM: CPT | Mod: 25

## 2023-12-22 PROCEDURE — 0241U: CPT

## 2023-12-22 RX ORDER — DEXAMETHASONE 0.5 MG/5ML
10 ELIXIR ORAL ONCE
Refills: 0 | Status: COMPLETED | OUTPATIENT
Start: 2023-12-22 | End: 2023-12-22

## 2023-12-22 RX ORDER — ACETAMINOPHEN 500 MG
975 TABLET ORAL ONCE
Refills: 0 | Status: COMPLETED | OUTPATIENT
Start: 2023-12-22 | End: 2023-12-22

## 2023-12-22 RX ADMIN — Medication 975 MILLIGRAM(S): at 14:55

## 2023-12-22 RX ADMIN — Medication 10 MILLIGRAM(S): at 14:56

## 2023-12-22 NOTE — ED PROVIDER NOTE - NSFOLLOWUPINSTRUCTIONS_ED_ALL_ED_FT
FOLLOW UP WITH YOUR PRIMARY CARE DOCTOR      VIRAL SYNDROME - Discharge Care     Viral Syndrome    WHAT YOU NEED TO KNOW:    Viral syndrome is a term used for symptoms of an infection caused by a virus. Viruses are spread easily from person to person through the air and on shared items. An illness caused by a virus usually goes away in 10 to 14 days without treatment. Antibiotics are not given for a viral infection.     DISCHARGE INSTRUCTIONS:    Call 911 for the following:     You have a seizure.       You cannot be woken.       You have chest pain or trouble breathing.     Seek care immediately if:     You have a stiff neck, a bad headache, and sensitivity to light.       You feel weak, dizzy, or confused.       You stop urinating or urinate a lot less than normal.       You cough up blood or thick, yellow or green, mucus.       You have severe abdominal pain or your abdomen is larger than usual.     Contact your healthcare provider if:     Your symptoms do not get better with treatment, or get worse, after 3 days.       You have a rash or ear pain.       You have burning when you urinate.       You have questions or concerns about your condition or care.    Medicines: You may need any of the following:     Acetaminophen decreases pain and fever. It is available without a doctor's order. Ask how much medicine to take and how often to take it. Follow directions. Acetaminophen can cause liver damage if not taken correctly.       NSAIDs, such as ibuprofen, help decrease swelling, pain, and fever. NSAIDs can cause stomach bleeding or kidney problems in certain people. If you take blood thinner medicine, always ask your healthcare provider if NSAIDs are safe for you. Always read the medicine label and follow directions.      Cold medicine helps decrease swelling, control a cough, and relieve chest or nasal congestion.       Saline nasal spray helps decrease nasal congestion.       Take your medicine as directed. Contact your healthcare provider if you think your medicine is not helping or if you have side effects. Tell him of her if you are allergic to any medicine. Keep a list of the medicines, vitamins, and herbs you take. Include the amounts, and when and why you take them. Bring the list or the pill bottles to follow-up visits. Carry your medicine list with you in case of an emergency.    Manage your symptoms:     Drink liquids as directed to prevent dehydration. Ask how much liquid to drink each day and which liquids are best for you. Ask if you should drink an oral rehydration solution (ORS). An ORS has the right amounts of water, salts, and sugar you need to replace body fluids. This may help prevent dehydration caused by vomiting or diarrhea. Do not drink liquids with caffeine. Drinks with caffeine can make dehydration worse.       Get plenty of rest to help your body heal. Take naps throughout the day. Ask your healthcare provider when you can return to work and your normal activities.       Use a cool mist humidifier to help you breathe easier if you have nasal or chest congestion. Ask your healthcare provider how to use a cool mist humidifier.       Eat honey or use cough drops to help decrease throat discomfort. Ask your healthcare provider how much honey you should eat each day. Cough drops are available without a doctor's order. Follow directions for taking cough drops.       Do not smoke and stay away from others who smoke. Nicotine and other chemicals in cigarettes and cigars can cause lung damage. Smoking can also delay healing. Ask your healthcare provider for information if you currently smoke and need help to quit. E-cigarettes or smokeless tobacco still contain nicotine. Talk to your healthcare provider before you use these products.       Wash your hands frequently to prevent the spread of germs to others. Use soap and water. Use gel hand  when soap and water are not available. Wash your hands after you use the bathroom, cough, or sneeze. Wash your hands before you prepare or eat food.     Follow up with your healthcare provider as directed: Write down your questions so you remember to ask them during your visits.

## 2023-12-22 NOTE — ED PROVIDER NOTE - CLINICAL SUMMARY MEDICAL DECISION MAKING FREE TEXT BOX
Patient presented with cough and sore throat x 2 weeks as documented. Otherwise HD stable, well appearing, no acute respiratory distress on RA. Lungs clear. No meningeal signs or petechiae/rash, no concern for strep pharyngitis based on centor criteria, neuro intact, TMs clear, abdomen non-tender. CXR with no focal consolidations to suggest PNA. Patient remained stable on RA, and able to ambulate without difficulty or desaturation. Likely viral etiology. Given the above, will discharge home with outpatient follow up. Patient agreeable with plan. Agrees to return to ED immediately for any new or worsening symptoms.

## 2023-12-22 NOTE — ED PROVIDER NOTE - OBJECTIVE STATEMENT
42-year-old female, past medical history as documented, presenting with sore throat x 2 weeks.  Patient also complaining of cough over this time as well.  Otherwise denies fevers, chest pain, dyspnea, nausea/vomiting/diarrhea, blood in stool, urinary symptoms or any other complaints.

## 2023-12-22 NOTE — ED ADULT NURSE NOTE - HISTORY OF COVID-19 VACCINATION
Vaccine status unknown Complex Repair And Graft Additional Text (Will Appearing After The Standard Complex Repair Text): The complex repair was not sufficient to completely close the primary defect. The remaining additional defect was repaired with the graft mentioned below.

## 2023-12-22 NOTE — ED PROVIDER NOTE - PATIENT PORTAL LINK FT
You can access the FollowMyHealth Patient Portal offered by [facility] by registering at the following website: [email or web address]. By joining [employer]’s FollowMyHealth portal, you will also be able to view your health information using other applications (apps) compatible with our system. You can access the FollowMyHealth Patient Portal offered by [facility] by registering at the following website: [email or web address]. By joining [employer]’s FollowMyHealth portal, you will also be able to view your health information using other applications (apps) compatible with our system.

## 2023-12-22 NOTE — ED ADULT NURSE NOTE - HIV OFFER
Problem: Discharge Planning  Goal: Discharge to home or other facility with appropriate resources  Outcome: Progressing  Flowsheets (Taken 2/20/2023 0214)  Discharge to home or other facility with appropriate resources:   Identify barriers to discharge with patient and caregiver   Arrange for needed discharge resources and transportation as appropriate   Identify discharge learning needs (meds, wound care, etc)   Arrange for interpreters to assist at discharge as needed   Refer to discharge planning if patient needs post-hospital services based on physician order or complex needs related to functional status, cognitive ability or social support system     Problem: Pain  Goal: Verbalizes/displays adequate comfort level or baseline comfort level  Outcome: Progressing  Flowsheets (Taken 2/20/2023 0214)  Verbalizes/displays adequate comfort level or baseline comfort level:   Encourage patient to monitor pain and request assistance   Assess pain using appropriate pain scale   Administer analgesics based on type and severity of pain and evaluate response   Implement non-pharmacological measures as appropriate and evaluate response   Consider cultural and social influences on pain and pain management   Notify Licensed Independent Practitioner if interventions unsuccessful or patient reports new pain     Problem: Safety - Adult  Goal: Free from fall injury  Outcome: Progressing  Flowsheets (Taken 2/20/2023 0214)  Free From Fall Injury:   Instruct family/caregiver on patient safety   Based on caregiver fall risk screen, instruct family/caregiver to ask for assistance with transferring infant if caregiver noted to have fall risk factors  Note: Call light in reach at all times, frequent checks, bed in lowest position, wheels of bed and chair locked, non skid footwear on, appropriate transfer techniques, personal items within reach, walkways free of obstructions, fall risk armband and sign displayed, [place] fall risk score per protocol. No falls this shift, care ongoing.        Problem: Infection - Adult  Goal: Absence of infection at discharge  Outcome: Progressing  Flowsheets (Taken 2/20/2023 0214)  Absence of infection at discharge:   Monitor lab/diagnostic results   Assess and monitor for signs and symptoms of infection   Monitor all insertion sites i.e., indwelling lines, tubes and drains   Monitor endotracheal (as able) and nasal secretions for changes in amount and color   [place] appropriate cooling/warming therapies per order   Administer medications as ordered   Instruct and encourage patient and family to use good hand hygiene technique   Identify and instruct in appropriate isolation precautions for identified infection/condition     Problem: Nutrition Deficit:  Goal: Optimize nutritional status  Outcome: Progressing  Flowsheets (Taken 2/20/2023 0214)  Nutrient intake appropriate for improving, restoring, or maintaining nutritional needs:   Assess nutritional status and recommend course of action   Monitor oral intake, labs, and treatment plans   Recommend appropriate diets, oral nutritional supplements, and vitamin/mineral supplements   Order, calculate, and assess calorie counts as needed   Recommend, monitor, and adjust tube feedings and TPN/PPN based on assessed needs   Provide specific nutrition education to patient or family as appropriate Previously Declined (within the last year)

## 2023-12-22 NOTE — ED ADULT NURSE NOTE - NSFALLUNIVINTERV_ED_ALL_ED
Bed/Stretcher in lowest position, wheels locked, appropriate side rails in place/Call bell, personal items and telephone in reach/Instruct patient to call for assistance before getting out of bed/chair/stretcher/Non-slip footwear applied when patient is off stretcher/[place] to call system/Physically safe environment - no spills, clutter or unnecessary equipment/Purposeful proactive rounding/Room/bathroom lighting operational, light cord in reach Bed/Stretcher in lowest position, wheels locked, appropriate side rails in place/Call bell, personal items and telephone in reach/Instruct patient to call for assistance before getting out of bed/chair/stretcher/Non-slip footwear applied when patient is off stretcher/[place] to call system/Physically safe environment - no spills, clutter or unnecessary equipment/Purposeful proactive rounding/Room/bathroom lighting operational, light cord in reach

## 2023-12-22 NOTE — ED ADULT TRIAGE NOTE - INTERNATIONAL TRAVEL
No 16mo female no pmhx now bib mom w co fever x 3 days , no cough, no vomiting, acting well and noted to have a petechial rash this evening to legs and arms.   pt well appearing and well hydrated on exam with diffuse petechiae on bl lower ext but sparing soles and also on bl arms particularly in axillae. no hsm. no other focal findings on exam. cbc sent to ro thrombocytopenia and platelets 365.  ? HSP- will check ua for hematuria.

## 2024-01-04 ENCOUNTER — APPOINTMENT (OUTPATIENT)
Dept: PODIATRY | Facility: CLINIC | Age: 44
End: 2024-01-04

## 2024-01-07 ENCOUNTER — EMERGENCY (EMERGENCY)
Facility: HOSPITAL | Age: 44
LOS: 0 days | Discharge: HOME | End: 2024-01-07
Attending: STUDENT IN AN ORGANIZED HEALTH CARE EDUCATION/TRAINING PROGRAM
Payer: MEDICAID

## 2024-01-07 VITALS
DIASTOLIC BLOOD PRESSURE: 73 MMHG | HEART RATE: 70 BPM | OXYGEN SATURATION: 98 % | SYSTOLIC BLOOD PRESSURE: 148 MMHG | WEIGHT: 220.02 LBS | TEMPERATURE: 98 F | HEIGHT: 67 IN | RESPIRATION RATE: 18 BRPM

## 2024-01-07 DIAGNOSIS — J06.9 ACUTE UPPER RESPIRATORY INFECTION, UNSPECIFIED: ICD-10-CM

## 2024-01-07 DIAGNOSIS — R05.9 COUGH, UNSPECIFIED: ICD-10-CM

## 2024-01-07 DIAGNOSIS — E11.9 TYPE 2 DIABETES MELLITUS WITHOUT COMPLICATIONS: ICD-10-CM

## 2024-01-07 DIAGNOSIS — J02.9 ACUTE PHARYNGITIS, UNSPECIFIED: ICD-10-CM

## 2024-01-07 DIAGNOSIS — Z20.822 CONTACT WITH AND (SUSPECTED) EXPOSURE TO COVID-19: ICD-10-CM

## 2024-01-07 DIAGNOSIS — Z21 ASYMPTOMATIC HUMAN IMMUNODEFICIENCY VIRUS [HIV] INFECTION STATUS: ICD-10-CM

## 2024-01-07 DIAGNOSIS — R09.81 NASAL CONGESTION: ICD-10-CM

## 2024-01-07 PROCEDURE — 0241U: CPT

## 2024-01-07 PROCEDURE — 71046 X-RAY EXAM CHEST 2 VIEWS: CPT

## 2024-01-07 PROCEDURE — 99284 EMERGENCY DEPT VISIT MOD MDM: CPT

## 2024-01-07 PROCEDURE — 99283 EMERGENCY DEPT VISIT LOW MDM: CPT | Mod: 25

## 2024-01-07 PROCEDURE — 71046 X-RAY EXAM CHEST 2 VIEWS: CPT | Mod: 26

## 2024-01-07 RX ORDER — DEXAMETHASONE 0.5 MG/5ML
10 ELIXIR ORAL ONCE
Refills: 0 | Status: COMPLETED | OUTPATIENT
Start: 2024-01-07 | End: 2024-01-07

## 2024-01-07 RX ORDER — FLUTICASONE PROPIONATE 50 MCG
1 SPRAY, SUSPENSION NASAL
Qty: 1 | Refills: 0
Start: 2024-01-07 | End: 2024-01-11

## 2024-01-07 RX ORDER — AZITHROMYCIN 500 MG/1
1 TABLET, FILM COATED ORAL
Qty: 1 | Refills: 0
Start: 2024-01-07 | End: 2024-01-11

## 2024-01-07 RX ADMIN — Medication 10 MILLIGRAM(S): at 15:44

## 2024-01-07 NOTE — ED PROVIDER NOTE - NSFOLLOWUPCLINICS_GEN_ALL_ED_FT
[facility]  Medicine  [address], [place]   Phone: [phone]  Fax:      [facility]  Medicine  [address], [place]   Phone: [phone]  Fax:

## 2024-01-07 NOTE — ED ADULT NURSE NOTE - NSFALLUNIVINTERV_ED_ALL_ED
Bed/Stretcher in lowest position, wheels locked, appropriate side rails in place/Call bell, personal items and telephone in reach/Instruct patient to call for assistance before getting out of bed/chair/stretcher/Non-slip footwear applied when patient is off stretcher/[place] to call system/Physically safe environment - no spills, clutter or unnecessary equipment/Purposeful proactive rounding/Room/bathroom lighting operational, light cord in reach Bed/Stretcher in lowest position, wheels locked, appropriate side rails in place/Call bell, personal items and telephone in reach/Instruct patient to call for assistance before getting out of bed/chair/stretcher/Non-slip footwear applied when patient is off stretcher/[place] to call system/Physically safe environment - no spills, clutter or unnecessary equipment/Purposeful proactive rounding/Room/bathroom lighting operational, light cord in reach

## 2024-01-07 NOTE — ED PROVIDER NOTE - CLINICAL SUMMARY MEDICAL DECISION MAKING FREE TEXT BOX
43-year-old female with history of HIV and diabetes states that her CD4 is over 500 presenting to the ED for evaluation of cough for 2 weeks associated with yellow phlegm, nasal congestion and sore throat.  Denies fevers.  Denies any significant vomiting or diarrhea.  Denies dysuria.  On exam well-appearing no acute distress, vital stable.  Posterior oropharynx normal.  Uvula midline.  No tenderness to the sinuses.  Lungs clear to auscultation bilaterally normal work of breathing.  No lower extremity edema.  Chest x-ray images independently interpreted me with no focal opacities.  Patient given Decadron and will be given azithromycin given yellow phlegm.  Follow-up with her PCP and ID as discussed.

## 2024-01-07 NOTE — ED PROVIDER NOTE - ADDITIONAL NOTES AND INSTRUCTIONS:
Ms Reed was seen in the Emergency Department on 1/7/24 and can return to school or work by the listed date with activity as tolerated.

## 2024-01-07 NOTE — ED PROVIDER NOTE - NSPTACCESSSVCSAPPTDETAILS_ED_ALL_ED_FT
please refer for routine follow up of flu like symptoms  patient is Algerian speaking please refer for routine follow up of flu like symptoms  patient is Estonian speaking

## 2024-01-07 NOTE — ED PROVIDER NOTE - WR ORDER NAME 1
PATIENT SAW YOU LAST Saturday WITH A SINUS AND EAR INFECTION  EARS STILL ARE CLOGGED AND SOUNDS LIKE IN WIND TUNNEL  NO PAIN  MUFFLED HEARING WITH HEARTBEAT  STILL ON ABX  WHAT DO YOU RECOMMEND?     PLEASE ADVISE .[phone]    [facility] Xray Chest 2 Views PA/Lat

## 2024-01-07 NOTE — ED PROVIDER NOTE - CARE PROVIDER_API CALL
Felicita Frank  Infectious Disease  [address] [place] [address]  Phone: [phone]  Fax: [phone]  Follow Up Time:    Felicita Frank  Infectious Disease  [address] [place] [address]  Phone: [phone]  Fax: [phone]  Follow Up Time:

## 2024-01-07 NOTE — ED PROVIDER NOTE - PHYSICAL EXAMINATION
As Follows:  CONST: Well appearing in NAD  EYES: PERRL, EOMI, Sclera and conjunctiva clear.   ENT: No nasal discharge. TM's clear B/L without drainage. Oropharynx normal appearing, no erythema or exudates. Uvula midline  CARD: No murmurs, rubs, or gallops; Normal rate and rhythm  RESP: BS Equal B/L, No wheezes, rhonchi or rales. No distress or accessory breathing  GI: Soft, non-tender, non-distended.  SKIN: Warm, dry, no acute rashes. MMM  NEURO: A&Ox3, No focal deficits. Steady gait

## 2024-01-07 NOTE — ED PROVIDER NOTE - OBJECTIVE STATEMENT
Patient is a 43 year old female with pmhx of HIV / dm presents for evaluation of cough, sore throat, congestion, chills worsening over the past 3 days. She states she had similar symptoms since at least 12/22/23. She follows with an ID doctor and states her cd4 count was about 570 and viral load was about 10,000 in august of 2023. She denies any other complaints at this time.

## 2024-01-07 NOTE — ED PROVIDER NOTE - PATIENT PORTAL LINK FT
You can access the FollowMyHealth Patient Portal offered by [facility] by registering at the following website: [email or web address]. By joining [employer]’s FollowMyHealth portal, you will also be able to view your health information using other applications (apps) compatible with our system. You can access the FollowMyHealth Patient Portal offered by [facility] by registering at the following website: [email or web address]. By joining [employer]’s FollowMyHealth portal, you will also be able to view your health information using other applications (apps) compatible with our system. You can access the FollowMyHealth Patient Portal offered by [facility] by registering at the following website: [email or web address]. By joining [employer]’s FollowMyHealth portal, you will also be able to view your health information using other applications (apps) compatible with our system. You can access the FollowMyHealth Patient Portal offered by [facility] by registering at the following website: [email or web address]. By joining [employer]’s FollowMyHealth portal, you will also be able to view your health information using other applications (apps) compatible with our system.

## 2024-01-07 NOTE — ED ADULT TRIAGE NOTE - CHIEF COMPLAINT QUOTE
Arrived to ED c/o cough, sore throat since Dec 22. Arrived to ED c/o cough, sore throat since Dec 22. Denies fever

## 2024-01-07 NOTE — ED PROVIDER NOTE - NSFOLLOWUPINSTRUCTIONS_ED_ALL_ED_FT
FOLLOW UP WITH A PRIMARY CARE DOCTOR.    Nuestros coordinadores de referencias del departamento de emergencias se comunicarán con usted en las próximas 24 a  48 horas de 9:00 a. m. a 5:00 p. m. (de lunes a viernes) con manjinder zaina de seguimiento. Espere manjinder llamada telefónica del hospital en jie período de tiempo. Si no recibe manjinder llamada o si tiene alguna pregunta o inquietud, puede comunicarse con kya michele [phone].      Viral Syndrome    WHAT YOU NEED TO KNOW:    Viral syndrome is a term used for symptoms of an infection caused by a virus. Viruses are spread easily from person to person through the air and on shared items. An illness caused by a virus usually goes away in 10 to 14 days without treatment. Antibiotics are not given for a viral infection.     DISCHARGE INSTRUCTIONS:    Call 911 for the following:     You have a seizure.     You cannot be woken.     You have chest pain or trouble breathing.     Seek care immediately if:     You have a stiff neck, a bad headache, and sensitivity to light.     You feel weak, dizzy, or confused.     You stop urinating or urinate a lot less than normal.     You cough up blood or thick, yellow or green, mucus.     You have severe abdominal pain or your abdomen is larger than usual.     Contact your healthcare provider if:     Your symptoms do not get better with treatment, or get worse, after 3 days.     You have a rash or ear pain.     You have burning when you urinate.     You have questions or concerns about your condition or care.    Medicines: You may need any of the following:     Acetaminophen decreases pain and fever. It is available without a doctor's order. Ask how much medicine to take and how often to take it. Follow directions. Acetaminophen can cause liver damage if not taken correctly.     NSAIDs, such as ibuprofen, help decrease swelling, pain, and fever. NSAIDs can cause stomach bleeding or kidney problems in certain people. If you take blood thinner medicine, always ask your healthcare provider if NSAIDs are safe for you. Always read the medicine label and follow directions.    Cold medicine helps decrease swelling, control a cough, and relieve chest or nasal congestion.     Saline nasal spray helps decrease nasal congestion.     Take your medicine as directed. Contact your healthcare provider if you think your medicine is not helping or if you have side effects. Tell him of her if you are allergic to any medicine. Keep a list of the medicines, vitamins, and herbs you take. Include the amounts, and when and why you take them. Bring the list or the pill bottles to follow-up visits. Carry your medicine list with you in case of an emergency.    Manage your symptoms:     Drink liquids as directed to prevent dehydration. Ask how much liquid to drink each day and which liquids are best for you. Ask if you should drink an oral rehydration solution (ORS). An ORS has the right amounts of water, salts, and sugar you need to replace body fluids. This may help prevent dehydration caused by vomiting or diarrhea. Do not drink liquids with caffeine. Drinks with caffeine can make dehydration worse.     Get plenty of rest to help your body heal. Take naps throughout the day. Ask your healthcare provider when you can return to work and your normal activities.     Use a cool mist humidifier to help you breathe easier if you have nasal or chest congestion. Ask your healthcare provider how to use a cool mist humidifier.     Eat honey or use cough drops to help decrease throat discomfort. Ask your healthcare provider how much honey you should eat each day. Cough drops are available without a doctor's order. Follow directions for taking cough drops.     Do not smoke and stay away from others who smoke. Nicotine and other chemicals in cigarettes and cigars can cause lung damage. Smoking can also delay healing. Ask your healthcare provider for information if you currently smoke and need help to quit. E-cigarettes or smokeless tobacco still contain nicotine. Talk to your healthcare provider before you use these products.     Wash your hands frequently to prevent the spread of germs to others. Use soap and water. Use gel hand  when soap and water are not available. Wash your hands after you use the bathroom, cough, or sneeze. Wash your hands before you prepare or eat food.     Follow up with your healthcare provider as directed: Write down your questions so you remember to ask them during your visits. FOLLOW UP WITH A PRIMARY CARE DOCTOR.    Nuestros coordinadores de referencias del departamento de emergencias se comunicarán con usted en las próximas 24 a  48 horas de 9:00 a. m. a 5:00 p. m. (de lunes a viernes) con manjinder zaina de seguimiento. Espere manjinder llamada telefónica del hospital en jie período de tiempo. Si no recibe manjinder llamada o si tiene alguna pregunta o inquietud, puede comunicarse con kya michele [phone].      Viral Syndrome    WHAT YOU NEED TO KNOW:    Viral syndrome is a term used for symptoms of an infection caused by a virus. Viruses are spread easily from person to person through the air and on shared items. An illness caused by a virus usually goes away in 10 to 14 days without treatment. Antibiotics are not given for a viral infection.     DISCHARGE INSTRUCTIONS:    Call 911 for the following:     You have a seizure.     You cannot be woken.     You have chest pain or trouble breathing.     Seek care immediately if:     You have a stiff neck, a bad headache, and sensitivity to light.     You feel weak, dizzy, or confused.     You stop urinating or urinate a lot less than normal.     You cough up blood or thick, yellow or green, mucus.     You have severe abdominal pain or your abdomen is larger than usual.     Contact your healthcare provider if:     Your symptoms do not get better with treatment, or get worse, after 3 days.     You have a rash or ear pain.     You have burning when you urinate.     You have questions or concerns about your condition or care.    Medicines: You may need any of the following:     Acetaminophen decreases pain and fever. It is available without a doctor's order. Ask how much medicine to take and how often to take it. Follow directions. Acetaminophen can cause liver damage if not taken correctly.     NSAIDs, such as ibuprofen, help decrease swelling, pain, and fever. NSAIDs can cause stomach bleeding or kidney problems in certain people. If you take blood thinner medicine, always ask your healthcare provider if NSAIDs are safe for you. Always read the medicine label and follow directions.    Cold medicine helps decrease swelling, control a cough, and relieve chest or nasal congestion.     Saline nasal spray helps decrease nasal congestion.     Take your medicine as directed. Contact your healthcare provider if you think your medicine is not helping or if you have side effects. Tell him of her if you are allergic to any medicine. Keep a list of the medicines, vitamins, and herbs you take. Include the amounts, and when and why you take them. Bring the list or the pill bottles to follow-up visits. Carry your medicine list with you in case of an emergency.    Manage your symptoms:     Drink liquids as directed to prevent dehydration. Ask how much liquid to drink each day and which liquids are best for you. Ask if you should drink an oral rehydration solution (ORS). An ORS has the right amounts of water, salts, and sugar you need to replace body fluids. This may help prevent dehydration caused by vomiting or diarrhea. Do not drink liquids with caffeine. Drinks with caffeine can make dehydration worse.     Get plenty of rest to help your body heal. Take naps throughout the day. Ask your healthcare provider when you can return to work and your normal activities.     Use a cool mist humidifier to help you breathe easier if you have nasal or chest congestion. Ask your healthcare provider how to use a cool mist humidifier.     Eat honey or use cough drops to help decrease throat discomfort. Ask your healthcare provider how much honey you should eat each day. Cough drops are available without a doctor's order. Follow directions for taking cough drops.     Do not smoke and stay away from others who smoke. Nicotine and other chemicals in cigarettes and cigars can cause lung damage. Smoking can also delay healing. Ask your healthcare provider for information if you currently smoke and need help to quit. E-cigarettes or smokeless tobacco still contain nicotine. Talk to your healthcare provider before you use these products.     Wash your hands frequently to prevent the spread of germs to others. Use soap and water. Use gel hand  when soap and water are not available. Wash your hands after you use the bathroom, cough, or sneeze. Wash your hands before you prepare or eat food.     Follow up with your healthcare provider as directed: Write down your questions so you remember to ask them during your visits.

## 2024-01-08 NOTE — ED PROVIDER NOTE - CHILD ABUSE FACILITY
Continue weight-bearing as tolerated.  Continue range of motion exercises as instructed.  Ice and elevate as needed.  Tylenol or Motrin for pain.  Injection given into the bilateral shoulder.  Follow up as needed.    Call office once you get your MRI scheduled for a follow up.  Central Scheduling # [phone]    We are committed to providing you the best care possible.  If you receive a survey after visiting one of our offices, please take time to share your experience concerning your physician office visit.  These surveys are confidential and no health information about you is shared.  We are eager to improve for you and we are counting on your feedback to help make that happen.  
[facility]

## 2024-02-04 ENCOUNTER — NON-APPOINTMENT (OUTPATIENT)
Age: 44
End: 2024-02-04

## 2024-03-05 ENCOUNTER — NON-APPOINTMENT (OUTPATIENT)
Age: 44
End: 2024-03-05

## 2024-03-05 ENCOUNTER — APPOINTMENT (OUTPATIENT)
Dept: INFECTIOUS DISEASE | Facility: CLINIC | Age: 44
End: 2024-03-05
Payer: MEDICAID

## 2024-03-05 ENCOUNTER — OUTPATIENT (OUTPATIENT)
Dept: OUTPATIENT SERVICES | Facility: HOSPITAL | Age: 44
LOS: 1 days | End: 2024-03-05
Payer: MEDICAID

## 2024-03-05 VITALS
SYSTOLIC BLOOD PRESSURE: 105 MMHG | HEIGHT: 65 IN | RESPIRATION RATE: 15 BRPM | HEART RATE: 65 BPM | BODY MASS INDEX: 35.99 KG/M2 | WEIGHT: 216 LBS | TEMPERATURE: 98.4 F | DIASTOLIC BLOOD PRESSURE: 71 MMHG | OXYGEN SATURATION: 97 %

## 2024-03-05 DIAGNOSIS — B20 HUMAN IMMUNODEFICIENCY VIRUS [HIV] DISEASE: ICD-10-CM

## 2024-03-05 DIAGNOSIS — E11.9 TYPE 2 DIABETES MELLITUS WITHOUT COMPLICATIONS: ICD-10-CM

## 2024-03-05 DIAGNOSIS — E78.5 HYPERLIPIDEMIA, UNSPECIFIED: ICD-10-CM

## 2024-03-05 PROCEDURE — 90677 PCV20 VACCINE IM: CPT

## 2024-03-05 PROCEDURE — 99214 OFFICE O/P EST MOD 30 MIN: CPT

## 2024-03-05 PROCEDURE — 90715 TDAP VACCINE 7 YRS/> IM: CPT

## 2024-03-05 PROCEDURE — 90746 HEPB VACCINE 3 DOSE ADULT IM: CPT

## 2024-03-05 PROCEDURE — 90834 PSYTX W PT 45 MINUTES: CPT

## 2024-03-05 NOTE — ASSESSMENT
[FreeTextEntry1] : #AIDS, now well controlled - dx 2/2019 with PCP PNA and vaginal HSV outbreak- likely via sexual contact - at time of dx CD4 70; 5% HIV-1 RNA Quantitative, Viral Load: [id number] (02.12.19 @ 00:45) Toxo IgG neg, CMV IgG +, RPR neg - outpatient labs CD4 537;  2/2022 - continue biktarvy - VL UD - In a program with [facility]-- needs VL checked every 3 mo  #Hx HSV Herpes - Continue Valtrex 1g daily for prophylaxis  #Transaminitis 4/2022, 5/2022-- RESOLVED - could be related to fatty liver  #DM - Follows with Endo - Had SE with ozempic- abd pain, vomiting - Had SE with metformin - patient to follow with podiatry and ophthalmology appointments  # HLD - Cont atorvastatin 40 qd started  #HCM - HPV vaccine w/ GYN - COVID 2 x pfizer recommended booster - Flu UTD - PCV20 UTD - 12/2021 COVID+ - 4/6/22 pap neg, +BV, treated - Hep B non-immune, complete series today  - Declines meningitis - Tetanus UTD - Mammo UTD    I have personally reviewed all the available charts, laboratory data, radiology and consultation notes 31 min spent counseling patients. [HIV Education] : HIV Education

## 2024-03-05 NOTE — PHYSICAL EXAM
[General Appearance - Alert] : alert [Sclera] : the sclera and conjunctiva were normal [General Appearance - In No Acute Distress] : in no acute distress [PERRL With Normal Accommodation] : pupils were equal in size, round, reactive to light [Extraocular Movements] : extraocular movements were intact [Outer Ear] : the ears and nose were normal in appearance [Oropharynx] : the oropharynx was normal with no thrush [Neck Appearance] : the appearance of the neck was normal [Neck Cervical Mass (___cm)] : no neck mass was observed [Jugular Venous Distention Increased] : there was no jugular-venous distention [Thyroid Diffuse Enlargement] : the thyroid was not enlarged [Heart Rate And Rhythm] : heart rate was normal and rhythm regular [Heart Sounds] : normal S1 and S2 [Heart Sounds Gallop] : no gallops [Heart Sounds Pericardial Friction Rub] : no pericardial rub [Murmurs] : no murmurs [Full Pulse] : the pedal pulses are present [Bowel Sounds] : normal bowel sounds [Edema] : there was no peripheral edema [Abdomen Tenderness] : non-tender [Abdomen Soft] : soft [Abdomen Mass (___ Cm)] : no abdominal mass palpated [Costovertebral Angle Tenderness] : no CVA tenderness [No Palpable Adenopathy] : no palpable adenopathy [Musculoskeletal - Swelling] : no joint swelling [Motor Tone] : muscle strength and tone were normal [Nail Clubbing] : no clubbing  or cyanosis of the fingernails [Skin Color & Pigmentation] : normal skin color and pigmentation [] : no rash [Deep Tendon Reflexes (DTR)] : deep tendon reflexes were 2+ and symmetric [Sensation] : the sensory exam was normal to light touch and pinprick [No Focal Deficits] : no focal deficits [Oriented To Time, Place, And Person] : oriented to person, place, and time [Affect] : the affect was normal

## 2024-03-07 LAB
ALBUMIN SERPL ELPH-MCNC: 4.3 G/DL
ALP BLD-CCNC: 85 U/L
ALT SERPL-CCNC: 20 U/L
ANION GAP SERPL CALC-SCNC: 12 MMOL/L
AST SERPL-CCNC: 22 U/L
BASOPHILS # BLD AUTO: 0.04 K/UL
BASOPHILS NFR BLD AUTO: 0.7 %
BILIRUB SERPL-MCNC: 0.5 MG/DL
BUN SERPL-MCNC: 20 MG/DL
CALCIUM SERPL-MCNC: 9.7 MG/DL
CD16+CD56+ CELLS # BLD: 235 /UL
CD16+CD56+ CELLS NFR BLD: 12 %
CD19 CELLS NFR BLD: 325 /UL
CD3 CELLS # BLD: 1418 /UL
CD3 CELLS NFR BLD: 71 %
CD3+CD4+ CELLS # BLD: 608 /UL
CD3+CD4+ CELLS NFR BLD: 31 %
CD3+CD4+ CELLS/CD3+CD8+ CLL SPEC: 0.74 RATIO
CD3+CD8+ CELLS # SPEC: 826 /UL
CD3+CD8+ CELLS NFR BLD: 41 %
CELLS.CD3-CD19+/CELLS IN BLOOD: 16 %
CHLORIDE SERPL-SCNC: 104 MMOL/L
CHOLEST SERPL-MCNC: 193 MG/DL
CO2 SERPL-SCNC: 22 MMOL/L
CREAT SERPL-MCNC: 1 MG/DL
EGFR: 71 ML/MIN/1.73M2
EOSINOPHIL # BLD AUTO: 0.22 K/UL
EOSINOPHIL NFR BLD AUTO: 4 %
ESTIMATED AVERAGE GLUCOSE: 126 MG/DL
GLUCOSE SERPL-MCNC: 97 MG/DL
HBA1C MFR BLD HPLC: 6 %
HCT VFR BLD CALC: 43.1 %
HDLC SERPL-MCNC: 39 MG/DL
HGB BLD-MCNC: 14.1 G/DL
HIV1 RNA # SERPL NAA+PROBE: NOT DETECTED COPIES/ML
IMM GRANULOCYTES NFR BLD AUTO: 0.4 %
LDLC SERPL CALC-MCNC: 110 MG/DL
LYMPHOCYTES # BLD AUTO: 1.9 K/UL
LYMPHOCYTES NFR BLD AUTO: 34.5 %
MAN DIFF?: NORMAL
MCHC RBC-ENTMCNC: 32 PG
MCHC RBC-ENTMCNC: 32.7 G/DL
MCV RBC AUTO: 97.7 FL
MONOCYTES # BLD AUTO: 0.63 K/UL
MONOCYTES NFR BLD AUTO: 11.4 %
NEUTROPHILS # BLD AUTO: 2.7 K/UL
NEUTROPHILS NFR BLD AUTO: 49 %
NONHDLC SERPL-MCNC: 154 MG/DL
PLATELET # BLD AUTO: 258 K/UL
PMV BLD AUTO: 0 /100 WBCS
POTASSIUM SERPL-SCNC: 4.2 MMOL/L
PROT SERPL-MCNC: 7.4 G/DL
RBC # BLD: 4.41 M/UL
RBC # FLD: 13.1 %
SODIUM SERPL-SCNC: 138 MMOL/L
TRIGL SERPL-MCNC: 222 MG/DL
VIRAL LOAD INTERP: NOT DETECTED
VIRAL LOAD LOG: NOT DETECTED LOGCOPIES/ML
WBC # FLD AUTO: 5.51 K/UL

## 2024-03-27 ENCOUNTER — LABORATORY RESULT (OUTPATIENT)
Age: 44
End: 2024-03-27

## 2024-03-27 ENCOUNTER — OUTPATIENT (OUTPATIENT)
Dept: OUTPATIENT SERVICES | Facility: HOSPITAL | Age: 44
LOS: 1 days | End: 2024-03-27
Payer: MEDICAID

## 2024-03-27 DIAGNOSIS — E11.65 TYPE 2 DIABETES MELLITUS WITH HYPERGLYCEMIA: ICD-10-CM

## 2024-03-27 LAB
CHOLEST SERPL-MCNC: 170 MG/DL
ESTIMATED AVERAGE GLUCOSE: 114 MG/DL
HBA1C MFR BLD HPLC: 5.6 %
HDLC SERPL-MCNC: 39 MG/DL
LDLC SERPL CALC-MCNC: 93 MG/DL
NONHDLC SERPL-MCNC: 131 MG/DL
TRIGL SERPL-MCNC: 190 MG/DL

## 2024-03-27 PROCEDURE — 36415 COLL VENOUS BLD VENIPUNCTURE: CPT

## 2024-03-27 PROCEDURE — 82043 UR ALBUMIN QUANTITATIVE: CPT

## 2024-03-27 PROCEDURE — 80061 LIPID PANEL: CPT

## 2024-03-27 PROCEDURE — 83036 HEMOGLOBIN GLYCOSYLATED A1C: CPT

## 2024-03-28 DIAGNOSIS — E11.65 TYPE 2 DIABETES MELLITUS WITH HYPERGLYCEMIA: ICD-10-CM

## 2024-03-28 LAB
CREAT SPEC-SCNC: 155 MG/DL
MICROALBUMIN 24H UR DL<=1MG/L-MCNC: 1.7 MG/DL
MICROALBUMIN/CREAT 24H UR-RTO: 11 MG/G

## 2024-04-12 ENCOUNTER — NON-APPOINTMENT (OUTPATIENT)
Age: 44
End: 2024-04-12

## 2024-04-24 ENCOUNTER — NON-APPOINTMENT (OUTPATIENT)
Age: 44
End: 2024-04-24

## 2024-04-25 ENCOUNTER — NON-APPOINTMENT (OUTPATIENT)
Age: 44
End: 2024-04-25

## 2024-05-01 ENCOUNTER — APPOINTMENT (OUTPATIENT)
Dept: ENDOCRINOLOGY | Facility: CLINIC | Age: 44
End: 2024-05-01

## 2024-05-01 ENCOUNTER — OUTPATIENT (OUTPATIENT)
Dept: OUTPATIENT SERVICES | Facility: HOSPITAL | Age: 44
LOS: 1 days | End: 2024-05-01
Payer: MEDICAID

## 2024-05-01 VITALS
SYSTOLIC BLOOD PRESSURE: 107 MMHG | WEIGHT: 222 LBS | DIASTOLIC BLOOD PRESSURE: 73 MMHG | BODY MASS INDEX: 36.94 KG/M2 | HEART RATE: 60 BPM

## 2024-05-01 DIAGNOSIS — Z00.00 ENCOUNTER FOR GENERAL ADULT MEDICAL EXAMINATION WITHOUT ABNORMAL FINDINGS: ICD-10-CM

## 2024-05-01 DIAGNOSIS — E11.65 TYPE 2 DIABETES MELLITUS WITH HYPERGLYCEMIA: ICD-10-CM

## 2024-05-01 PROCEDURE — 99214 OFFICE O/P EST MOD 30 MIN: CPT

## 2024-05-01 RX ORDER — TIRZEPATIDE 12.5 MG/.5ML
12.5 INJECTION, SOLUTION SUBCUTANEOUS
Qty: 1 | Refills: 5 | Status: ACTIVE | COMMUNITY
Start: 2023-05-03 | End: 1900-01-01

## 2024-05-01 RX ORDER — ATORVASTATIN CALCIUM 40 MG/1
40 TABLET, FILM COATED ORAL
Qty: 90 | Refills: 2 | Status: ACTIVE | COMMUNITY
Start: 2022-05-31 | End: 1900-01-01

## 2024-05-07 DIAGNOSIS — E66.9 OBESITY, UNSPECIFIED: ICD-10-CM

## 2024-05-07 DIAGNOSIS — E11.9 TYPE 2 DIABETES MELLITUS WITHOUT COMPLICATIONS: ICD-10-CM

## 2024-05-14 ENCOUNTER — APPOINTMENT (OUTPATIENT)
Dept: OBGYN | Facility: CLINIC | Age: 44
End: 2024-05-14

## 2024-05-16 RX ORDER — BICTEGRAVIR SODIUM, EMTRICITABINE, AND TENOFOVIR ALAFENAMIDE FUMARATE 50; 200; 25 MG/1; MG/1; MG/1
50-200-25 TABLET ORAL
Qty: 90 | Refills: 3 | Status: ACTIVE | COMMUNITY
Start: 2022-04-26 | End: 1900-01-01

## 2024-06-04 ENCOUNTER — OUTPATIENT (OUTPATIENT)
Dept: OUTPATIENT SERVICES | Facility: HOSPITAL | Age: 44
LOS: 1 days | End: 2024-06-04
Payer: MEDICAID

## 2024-06-04 ENCOUNTER — APPOINTMENT (OUTPATIENT)
Dept: INFECTIOUS DISEASE | Facility: CLINIC | Age: 44
End: 2024-06-04
Payer: MEDICAID

## 2024-06-04 VITALS
RESPIRATION RATE: 14 BRPM | DIASTOLIC BLOOD PRESSURE: 65 MMHG | HEIGHT: 65 IN | BODY MASS INDEX: 36.15 KG/M2 | SYSTOLIC BLOOD PRESSURE: 96 MMHG | TEMPERATURE: 98.6 F | HEART RATE: 65 BPM | OXYGEN SATURATION: 98 % | WEIGHT: 217 LBS

## 2024-06-04 DIAGNOSIS — E66.9 OBESITY, UNSPECIFIED: ICD-10-CM

## 2024-06-04 DIAGNOSIS — E11.9 TYPE 2 DIABETES MELLITUS WITHOUT COMPLICATIONS: ICD-10-CM

## 2024-06-04 DIAGNOSIS — E11.9 TYPE 2 DIABETES MELLITUS W/OUT COMPLICATIONS: ICD-10-CM

## 2024-06-04 DIAGNOSIS — B20 HUMAN IMMUNODEFICIENCY VIRUS [HIV] DISEASE: ICD-10-CM

## 2024-06-04 DIAGNOSIS — E78.5 HYPERLIPIDEMIA, UNSPECIFIED: ICD-10-CM

## 2024-06-04 PROCEDURE — 99214 OFFICE O/P EST MOD 30 MIN: CPT

## 2024-06-04 NOTE — ASSESSMENT
[FreeTextEntry1] : #AIDS, now well controlled - dx 2/2019 with PCP PNA and vaginal HSV outbreak- likely via sexual contact - at time of dx CD4 70; 5% HIV-1 RNA Quantitative, Viral Load: 019294 (02.12.19 @ 00:45) Toxo IgG neg, CMV IgG +, RPR neg - outpatient labs CD4 537;  2/2022 - continue biktarvy - VL UD - In a program with [facility]-- needs VL checked every 3 mo  #Hx HSV Herpes - Continue Valtrex 1g daily for prophylaxis  #Transaminitis 4/2022, 5/2022-- RESOLVED - could be related to fatty liver  #DM - Follows with Endo - Had SE with ozempic- abd pain, vomiting - Had SE with metformin - patient to follow with podiatry and ophthalmology appointments  # HLD - Cont atorvastatin 40 qd started  #HCM - HPV vaccine w/ GYN - COVID 2 x pfizer recommended booster - Flu UTD - PCV20 UTD - 12/2021 COVID+ - 4/6/22 pap neg, +BV, treated - Hep B UTD - Declines meningitis - Tetanus UTD - Mammo UTD   I have personally reviewed all the available charts, laboratory data, radiology and consultation notes 31 min spent counseling patients.   [HIV Education] : HIV Education

## 2024-06-26 LAB
ALBUMIN SERPL ELPH-MCNC: 4 G/DL
ALP BLD-CCNC: 79 U/L
ALT SERPL-CCNC: 24 U/L
ANION GAP SERPL CALC-SCNC: 11 MMOL/L
AST SERPL-CCNC: 22 U/L
BASOPHILS # BLD AUTO: 0.04 K/UL
BASOPHILS NFR BLD AUTO: 0.8 %
BILIRUB SERPL-MCNC: 0.5 MG/DL
BUN SERPL-MCNC: 17 MG/DL
CALCIUM SERPL-MCNC: 9.1 MG/DL
CD16+CD56+ CELLS # BLD: 187 /UL
CD16+CD56+ CELLS NFR BLD: 12 %
CD19 CELLS NFR BLD: 263 /UL
CD3 CELLS # BLD: 1064 /UL
CD3 CELLS NFR BLD: 70 %
CD3+CD4+ CELLS # BLD: 502 /UL
CD3+CD4+ CELLS NFR BLD: 33 %
CD3+CD4+ CELLS/CD3+CD8+ CLL SPEC: 0.88 RATIO
CD3+CD8+ CELLS # SPEC: 569 /UL
CD3+CD8+ CELLS NFR BLD: 37 %
CELLS.CD3-CD19+/CELLS IN BLOOD: 17 %
CHLORIDE SERPL-SCNC: 108 MMOL/L
CO2 SERPL-SCNC: 21 MMOL/L
CREAT SERPL-MCNC: 0.9 MG/DL
EGFR: 80 ML/MIN/1.73M2
EOSINOPHIL # BLD AUTO: 0.26 K/UL
EOSINOPHIL NFR BLD AUTO: 5.4 %
GLUCOSE SERPL-MCNC: 111 MG/DL
HCT VFR BLD CALC: 37.8 %
HGB BLD-MCNC: 12.6 G/DL
IMM GRANULOCYTES NFR BLD AUTO: 0.4 %
LYMPHOCYTES # BLD AUTO: 1.41 K/UL
LYMPHOCYTES NFR BLD AUTO: 29.1 %
MAN DIFF?: NORMAL
MCHC RBC-ENTMCNC: 32.7 PG
MCHC RBC-ENTMCNC: 33.3 G/DL
MCV RBC AUTO: 98.2 FL
MONOCYTES # BLD AUTO: 0.53 K/UL
MONOCYTES NFR BLD AUTO: 10.9 %
NEUTROPHILS # BLD AUTO: 2.59 K/UL
NEUTROPHILS NFR BLD AUTO: 53.4 %
PLATELET # BLD AUTO: 240 K/UL
PMV BLD AUTO: 0 /100 WBCS
POTASSIUM SERPL-SCNC: 4.4 MMOL/L
PROT SERPL-MCNC: 6.7 G/DL
RBC # BLD: 3.85 M/UL
RBC # FLD: 13.7 %
SODIUM SERPL-SCNC: 140 MMOL/L
WBC # FLD AUTO: 4.85 K/UL

## 2024-06-27 LAB
HIV1 RNA # SERPL NAA+PROBE: NOT DETECTED COPIES/ML
VIRAL LOAD INTERP: NOT DETECTED
VIRAL LOAD LOG: NOT DETECTED LOGCOPIES/ML

## 2024-07-09 ENCOUNTER — APPOINTMENT (OUTPATIENT)
Dept: PODIATRY | Facility: CLINIC | Age: 44
End: 2024-07-09

## 2024-08-05 ENCOUNTER — APPOINTMENT (OUTPATIENT)
Dept: PODIATRY | Facility: CLINIC | Age: 44
End: 2024-08-05

## 2024-08-05 ENCOUNTER — OUTPATIENT (OUTPATIENT)
Dept: OUTPATIENT SERVICES | Facility: HOSPITAL | Age: 44
LOS: 1 days | End: 2024-08-05
Payer: MEDICAID

## 2024-08-05 DIAGNOSIS — Z00.00 ENCOUNTER FOR GENERAL ADULT MEDICAL EXAMINATION WITHOUT ABNORMAL FINDINGS: ICD-10-CM

## 2024-08-05 PROCEDURE — 99213 OFFICE O/P EST LOW 20 MIN: CPT

## 2024-08-06 PROBLEM — S90.222A SUBUNGUAL HEMATOMA OF TOE OF LEFT FOOT, INITIAL ENCOUNTER: Status: ACTIVE | Noted: 2024-08-06

## 2024-08-06 NOTE — REVIEW OF SYSTEMS
Home [As Noted in HPI] : as noted in HPI [Skin Lesions] : skin lesion [Negative] : Constitutional [de-identified] : nail discoloration

## 2024-08-06 NOTE — PHYSICAL EXAM
[General Appearance - Alert] : alert [General Appearance - In No Acute Distress] : in no acute distress [2+] : left foot dorsalis pedis 2+ [FreeTextEntry1] : Noted annular scaling lesions on the plantar surface of both feet, in moccasin distribution left 3rd subungual hematoma [Vibration Dec.] : normal vibratory sensation at the level of the toes [Diminished Throughout Right Foot] : normal sensation with monofilament testing throughout right foot [Diminished Throughout Left Foot] : normal sensation with monofilament testing throughout left foot [Oriented To Time, Place, And Person] : oriented to person, place, and time [Impaired Insight] : insight and judgment were intact

## 2024-08-06 NOTE — HISTORY OF PRESENT ILLNESS
[FreeTextEntry1] : 43 year old F  presents for a diabetic foot evaluation. Ms. APPIAH denies any tingling burning in her feet. Last A1c was 5.6% 3/2024

## 2024-08-06 NOTE — ASSESSMENT
[FreeTextEntry1] : Modified ADA Diabetic Foot Risk Classification 0  A1c reviewed  -Loss of protective sensation: yno -Presence of foot deformity:   no  -presence of pre-ulcerative lesion: no   -hemorrhage into callus:  no -atrophy of heel or metatarsal fat pads:s no  -Diabetic neurovascular foot assessment  performed.  -Rx ciclopirox 0.77 cream for tinea pedis x 6 weeks -left 3rd subungual hematoma. no h/o of trauma. nail plate remains firms attached to nail bed -return 1 year

## 2024-08-09 DIAGNOSIS — S90.222A CONTUSION OF LEFT LESSER TOE(S) WITH DAMAGE TO NAIL, INITIAL ENCOUNTER: ICD-10-CM

## 2024-08-09 DIAGNOSIS — B35.3 TINEA PEDIS: ICD-10-CM

## 2024-08-09 DIAGNOSIS — E11.65 TYPE 2 DIABETES MELLITUS WITH HYPERGLYCEMIA: ICD-10-CM
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
## 2024-09-03 ENCOUNTER — APPOINTMENT (OUTPATIENT)
Dept: INFECTIOUS DISEASE | Facility: CLINIC | Age: 44
End: 2024-09-03

## 2024-09-03 ENCOUNTER — OUTPATIENT (OUTPATIENT)
Dept: OUTPATIENT SERVICES | Facility: HOSPITAL | Age: 44
LOS: 1 days | End: 2024-09-03
Payer: MEDICAID

## 2024-09-03 VITALS
RESPIRATION RATE: 18 BRPM | OXYGEN SATURATION: 97 % | HEART RATE: 60 BPM | HEIGHT: 65 IN | DIASTOLIC BLOOD PRESSURE: 57 MMHG | SYSTOLIC BLOOD PRESSURE: 100 MMHG | TEMPERATURE: 97.2 F | WEIGHT: 221 LBS | BODY MASS INDEX: 36.82 KG/M2

## 2024-09-03 DIAGNOSIS — E78.5 HYPERLIPIDEMIA, UNSPECIFIED: ICD-10-CM

## 2024-09-03 DIAGNOSIS — M25.562 PAIN IN RIGHT KNEE: ICD-10-CM

## 2024-09-03 DIAGNOSIS — E11.9 TYPE 2 DIABETES MELLITUS W/OUT COMPLICATIONS: ICD-10-CM

## 2024-09-03 DIAGNOSIS — M17.0 BILATERAL PRIMARY OSTEOARTHRITIS OF KNEE: ICD-10-CM

## 2024-09-03 DIAGNOSIS — B20 HUMAN IMMUNODEFICIENCY VIRUS [HIV] DISEASE: ICD-10-CM

## 2024-09-03 DIAGNOSIS — Z21 ASYMPTOMATIC HUMAN IMMUNODEFICIENCY VIRUS [HIV] INFECTION STATUS: ICD-10-CM

## 2024-09-03 DIAGNOSIS — M25.561 PAIN IN RIGHT KNEE: ICD-10-CM

## 2024-09-03 DIAGNOSIS — E11.9 TYPE 2 DIABETES MELLITUS WITHOUT COMPLICATIONS: ICD-10-CM

## 2024-09-03 PROCEDURE — 99214 OFFICE O/P EST MOD 30 MIN: CPT

## 2024-09-03 PROCEDURE — 90746 HEPB VACCINE 3 DOSE ADULT IM: CPT

## 2024-09-03 PROCEDURE — 90472 IMMUNIZATION ADMIN EACH ADD: CPT

## 2024-09-03 PROCEDURE — 90471 IMMUNIZATION ADMIN: CPT | Mod: 25

## 2024-09-03 PROCEDURE — 90656 IIV3 VACC NO PRSV 0.5 ML IM: CPT

## 2024-09-03 RX ORDER — DIAPER,BRIEF,INFANT-TODD,DISP
600-10 EACH MISCELLANEOUS
Qty: 90 | Refills: 0 | Status: ACTIVE | COMMUNITY
Start: 2024-09-03 | End: 1900-01-01

## 2024-09-03 NOTE — PHYSICAL EXAM
[General Appearance - Alert] : alert [General Appearance - In No Acute Distress] : in no acute distress [Sclera] : the sclera and conjunctiva were normal [PERRL With Normal Accommodation] : pupils were equal in size, round, reactive to light [Extraocular Movements] : extraocular movements were intact [Outer Ear] : the ears and nose were normal in appearance [Oropharynx] : the oropharynx was normal with no thrush [Neck Appearance] : the appearance of the neck was normal [Neck Cervical Mass (___cm)] : no neck mass was observed [Jugular Venous Distention Increased] : there was no jugular-venous distention [Thyroid Diffuse Enlargement] : the thyroid was not enlarged [Auscultation Breath Sounds / Voice Sounds] : lungs were clear to auscultation bilaterally [Heart Rate And Rhythm] : heart rate was normal and rhythm regular [Heart Sounds] : normal S1 and S2 [Heart Sounds Gallop] : no gallops [Murmurs] : no murmurs [Heart Sounds Pericardial Friction Rub] : no pericardial rub [Full Pulse] : the pedal pulses are present [Edema] : there was no peripheral edema [Bowel Sounds] : normal bowel sounds [Abdomen Soft] : soft [Abdomen Tenderness] : non-tender [Abdomen Mass (___ Cm)] : no abdominal mass palpated [Costovertebral Angle Tenderness] : no CVA tenderness [No Palpable Adenopathy] : no palpable adenopathy [Musculoskeletal - Swelling] : no joint swelling [Nail Clubbing] : no clubbing  or cyanosis of the fingernails [Motor Tone] : muscle strength and tone were normal [Skin Color & Pigmentation] : normal skin color and pigmentation [] : no rash [Deep Tendon Reflexes (DTR)] : deep tendon reflexes were 2+ and symmetric [Sensation] : the sensory exam was normal to light touch and pinprick [No Focal Deficits] : no focal deficits [Oriented To Time, Place, And Person] : oriented to person, place, and time [Affect] : the affect was normal [FreeTextEntry1] : b/l knee crepitus on movement

## 2024-09-03 NOTE — ASSESSMENT
[HIV Education] : HIV Education [FreeTextEntry1] : #AIDS, now well controlled - dx 2/2019 with PCP PNA and vaginal HSV outbreak- likely via sexual contact - at time of dx CD4 70; 5% HIV-1 RNA Quantitative, Viral Load: 531016 (02.12.19 @ 00:45) Toxo IgG neg, CMV IgG +, RPR neg - continue biktarvy - VL UD > 2 years - In a program with [facility]-- needs VL checked every 3 mo  #Knee pain - xrays - exam with crepitus - Tylenol PRN, exercise  #Hx HSV Herpes - Continue Valtrex 1g daily for prophylaxis  #DM - Follows with Endo - Had SE with ozempic- abd pain, vomiting - Had SE with metformin - patient to follow with podiatry and ophthalmology appointments  # HLD - Cont atorvastatin 40 daily  #HCM - PCP Dr. Cadena - HPV vaccine w/ GYN - Flu today - PCV20 3/2024 - 4/6/22 pap neg, +BV, treated - Hep B vaccine excursion, 1 more shot needed  - Declines meningitis - Tetanus UTD 3/2024   I have personally reviewed all the available charts, laboratory data, radiology and consultation notes 31 min spent counseling patient Interview conducted in [place], pt declined use of  phone

## 2024-09-04 LAB
ALBUMIN SERPL ELPH-MCNC: 4.3 G/DL
ALP BLD-CCNC: 102 U/L
ALT SERPL-CCNC: 33 U/L
ANION GAP SERPL CALC-SCNC: 12 MMOL/L
AST SERPL-CCNC: 22 U/L
BASOPHILS # BLD AUTO: 0.04 K/UL
BASOPHILS NFR BLD AUTO: 0.6 %
BILIRUB SERPL-MCNC: 0.3 MG/DL
BUN SERPL-MCNC: 21 MG/DL
CALCIUM SERPL-MCNC: 9.8 MG/DL
CD16+CD56+ CELLS # BLD: 193 /UL
CD16+CD56+ CELLS NFR BLD: 8 %
CD19 CELLS NFR BLD: 535 /UL
CD3 CELLS # BLD: 1709 /UL
CD3 CELLS NFR BLD: 70 %
CD3+CD4+ CELLS # BLD: 759 /UL
CD3+CD4+ CELLS NFR BLD: 31 %
CD3+CD4+ CELLS/CD3+CD8+ CLL SPEC: 0.8 RATIO
CD3+CD8+ CELLS # SPEC: 943 /UL
CD3+CD8+ CELLS NFR BLD: 39 %
CELLS.CD3-CD19+/CELLS IN BLOOD: 22 %
CHLORIDE SERPL-SCNC: 108 MMOL/L
CHOLEST SERPL-MCNC: 192 MG/DL
CO2 SERPL-SCNC: 22 MMOL/L
CREAT SERPL-MCNC: 1.1 MG/DL
EGFR: 63 ML/MIN/1.73M2
EOSINOPHIL # BLD AUTO: 0.25 K/UL
EOSINOPHIL NFR BLD AUTO: 3.6 %
ESTIMATED AVERAGE GLUCOSE: 140 MG/DL
GLUCOSE SERPL-MCNC: 118 MG/DL
HBA1C MFR BLD HPLC: 6.5 %
HCT VFR BLD CALC: 43.1 %
HDLC SERPL-MCNC: 35 MG/DL
HGB BLD-MCNC: 14.5 G/DL
IMM GRANULOCYTES NFR BLD AUTO: 0.3 %
LDLC SERPL CALC-MCNC: 103 MG/DL
LYMPHOCYTES # BLD AUTO: 2.37 K/UL
LYMPHOCYTES NFR BLD AUTO: 34.2 %
MAN DIFF?: NORMAL
MCHC RBC-ENTMCNC: 32.4 PG
MCHC RBC-ENTMCNC: 33.6 G/DL
MCV RBC AUTO: 96.4 FL
MONOCYTES # BLD AUTO: 0.66 K/UL
MONOCYTES NFR BLD AUTO: 9.5 %
NEUTROPHILS # BLD AUTO: 3.59 K/UL
NEUTROPHILS NFR BLD AUTO: 51.8 %
NONHDLC SERPL-MCNC: 157 MG/DL
PLATELET # BLD AUTO: 271 K/UL
PMV BLD AUTO: 0 /100 WBCS
POTASSIUM SERPL-SCNC: 4.6 MMOL/L
PROT SERPL-MCNC: 7.2 G/DL
RBC # BLD: 4.47 M/UL
RBC # FLD: 12.1 %
SODIUM SERPL-SCNC: 142 MMOL/L
T PALLIDUM AB SER QL IA: NEGATIVE
TRIGL SERPL-MCNC: 268 MG/DL
WBC # FLD AUTO: 6.93 K/UL

## 2024-09-24 ENCOUNTER — NON-APPOINTMENT (OUTPATIENT)
Age: 44
End: 2024-09-24

## 2024-11-18 ENCOUNTER — OUTPATIENT (OUTPATIENT)
Dept: OUTPATIENT SERVICES | Facility: HOSPITAL | Age: 44
LOS: 1 days | End: 2024-11-18
Payer: MEDICAID

## 2024-11-18 ENCOUNTER — RESULT REVIEW (OUTPATIENT)
Age: 44
End: 2024-11-18

## 2024-11-18 DIAGNOSIS — M25.569 PAIN IN UNSPECIFIED KNEE: ICD-10-CM

## 2024-11-18 PROCEDURE — 73564 X-RAY EXAM KNEE 4 OR MORE: CPT | Mod: 26,50

## 2024-11-18 PROCEDURE — 73564 X-RAY EXAM KNEE 4 OR MORE: CPT | Mod: 50

## 2024-11-19 DIAGNOSIS — M25.569 PAIN IN UNSPECIFIED KNEE: ICD-10-CM

## 2024-11-26 ENCOUNTER — OUTPATIENT (OUTPATIENT)
Dept: OUTPATIENT SERVICES | Facility: HOSPITAL | Age: 44
LOS: 1 days | End: 2024-11-26
Payer: MEDICAID

## 2024-11-26 ENCOUNTER — APPOINTMENT (OUTPATIENT)
Dept: INFECTIOUS DISEASE | Facility: CLINIC | Age: 44
End: 2024-11-26

## 2024-11-26 VITALS
HEART RATE: 68 BPM | OXYGEN SATURATION: 99 % | TEMPERATURE: 98 F | HEIGHT: 65 IN | WEIGHT: 223 LBS | SYSTOLIC BLOOD PRESSURE: 94 MMHG | RESPIRATION RATE: 16 BRPM | DIASTOLIC BLOOD PRESSURE: 73 MMHG | BODY MASS INDEX: 37.15 KG/M2

## 2024-11-26 DIAGNOSIS — Z21 ASYMPTOMATIC HUMAN IMMUNODEFICIENCY VIRUS [HIV] INFECTION STATUS: ICD-10-CM

## 2024-11-26 DIAGNOSIS — E11.9 TYPE 2 DIABETES MELLITUS WITHOUT COMPLICATIONS: ICD-10-CM

## 2024-11-26 DIAGNOSIS — Z23 ENCOUNTER FOR IMMUNIZATION: ICD-10-CM

## 2024-11-26 DIAGNOSIS — E11.9 TYPE 2 DIABETES MELLITUS W/OUT COMPLICATIONS: ICD-10-CM

## 2024-11-26 DIAGNOSIS — E66.9 OBESITY, UNSPECIFIED: ICD-10-CM

## 2024-11-26 DIAGNOSIS — B20 HUMAN IMMUNODEFICIENCY VIRUS [HIV] DISEASE: ICD-10-CM

## 2024-11-26 DIAGNOSIS — E78.5 HYPERLIPIDEMIA, UNSPECIFIED: ICD-10-CM

## 2024-11-26 PROCEDURE — 99214 OFFICE O/P EST MOD 30 MIN: CPT

## 2024-11-26 PROCEDURE — 90746 HEPB VACCINE 3 DOSE ADULT IM: CPT

## 2024-11-29 ENCOUNTER — OUTPATIENT (OUTPATIENT)
Dept: OUTPATIENT SERVICES | Facility: HOSPITAL | Age: 44
LOS: 1 days | End: 2024-11-29
Payer: MEDICAID

## 2024-11-29 ENCOUNTER — NON-APPOINTMENT (OUTPATIENT)
Age: 44
End: 2024-11-29

## 2024-11-29 ENCOUNTER — OUTPATIENT (OUTPATIENT)
Dept: OUTPATIENT SERVICES | Facility: HOSPITAL | Age: 44
LOS: 1 days | End: 2024-11-29

## 2024-11-29 ENCOUNTER — APPOINTMENT (OUTPATIENT)
Dept: OBGYN | Facility: CLINIC | Age: 44
End: 2024-11-29
Payer: MEDICAID

## 2024-11-29 VITALS
WEIGHT: 221 LBS | HEIGHT: 65 IN | BODY MASS INDEX: 36.82 KG/M2 | SYSTOLIC BLOOD PRESSURE: 100 MMHG | DIASTOLIC BLOOD PRESSURE: 62 MMHG

## 2024-11-29 DIAGNOSIS — Z01.419 ENCOUNTER FOR GYNECOLOGICAL EXAMINATION (GENERAL) (ROUTINE) W/OUT ABNORMAL FINDINGS: ICD-10-CM

## 2024-11-29 DIAGNOSIS — Z01.419 ENCOUNTER FOR GYNECOLOGICAL EXAMINATION (GENERAL) (ROUTINE) WITHOUT ABNORMAL FINDINGS: ICD-10-CM

## 2024-11-29 PROCEDURE — 88142 CYTOPATH C/V THIN LAYER: CPT

## 2024-11-29 PROCEDURE — 99396 PREV VISIT EST AGE 40-64: CPT

## 2024-11-29 PROCEDURE — 99459 PELVIC EXAMINATION: CPT

## 2024-11-29 PROCEDURE — 87624 HPV HI-RISK TYP POOLED RSLT: CPT

## 2024-12-02 ENCOUNTER — OUTPATIENT (OUTPATIENT)
Dept: OUTPATIENT SERVICES | Facility: HOSPITAL | Age: 44
LOS: 1 days | End: 2024-12-02

## 2024-12-02 DIAGNOSIS — Z01.419 ENCOUNTER FOR GYNECOLOGICAL EXAMINATION (GENERAL) (ROUTINE) WITHOUT ABNORMAL FINDINGS: ICD-10-CM

## 2024-12-02 RX ORDER — TIRZEPATIDE 15 MG/.5ML
15 INJECTION, SOLUTION SUBCUTANEOUS
Qty: 2 | Refills: 4 | Status: ACTIVE | COMMUNITY
Start: 2024-12-02 | End: 1900-01-01

## 2024-12-03 DIAGNOSIS — Z01.419 ENCOUNTER FOR GYNECOLOGICAL EXAMINATION (GENERAL) (ROUTINE) WITHOUT ABNORMAL FINDINGS: ICD-10-CM

## 2024-12-03 LAB — HPV HIGH+LOW RISK DNA PNL CVX: NOT DETECTED

## 2024-12-05 LAB — CYTOLOGY CVX/VAG DOC THIN PREP: NORMAL

## 2024-12-12 NOTE — ED PROVIDER NOTE - PATIENT PORTAL LINK FT
Initial (On Arrival)
You can access the FollowMyHealth Patient Portal offered by [facility] by registering at the following website: [email or web address]. By joining [facility]’s FollowMyHealth portal, you will also be able to view your health information using other applications (apps) compatible with our system.

## 2025-01-14 ENCOUNTER — OUTPATIENT (OUTPATIENT)
Dept: OUTPATIENT SERVICES | Facility: HOSPITAL | Age: 45
LOS: 1 days | End: 2025-01-14
Payer: MEDICAID

## 2025-01-14 ENCOUNTER — APPOINTMENT (OUTPATIENT)
Dept: INFECTIOUS DISEASE | Facility: CLINIC | Age: 45
End: 2025-01-14

## 2025-01-14 VITALS
TEMPERATURE: 97.7 F | BODY MASS INDEX: 37.49 KG/M2 | HEIGHT: 65 IN | SYSTOLIC BLOOD PRESSURE: 107 MMHG | HEART RATE: 63 BPM | RESPIRATION RATE: 15 BRPM | OXYGEN SATURATION: 99 % | DIASTOLIC BLOOD PRESSURE: 71 MMHG | WEIGHT: 225 LBS

## 2025-01-14 DIAGNOSIS — B20 HUMAN IMMUNODEFICIENCY VIRUS [HIV] DISEASE: ICD-10-CM

## 2025-01-14 DIAGNOSIS — E78.5 HYPERLIPIDEMIA, UNSPECIFIED: ICD-10-CM

## 2025-01-14 DIAGNOSIS — Z23 ENCOUNTER FOR IMMUNIZATION: ICD-10-CM

## 2025-01-14 DIAGNOSIS — Z21 ASYMPTOMATIC HUMAN IMMUNODEFICIENCY VIRUS [HIV] INFECTION STATUS: ICD-10-CM

## 2025-01-14 PROCEDURE — 90471 IMMUNIZATION ADMIN: CPT | Mod: 25

## 2025-01-14 PROCEDURE — 90746 HEPB VACCINE 3 DOSE ADULT IM: CPT

## 2025-01-14 PROCEDURE — 99214 OFFICE O/P EST MOD 30 MIN: CPT

## 2025-01-15 ENCOUNTER — APPOINTMENT (OUTPATIENT)
Dept: INFECTIOUS DISEASE | Facility: CLINIC | Age: 45
End: 2025-01-15

## 2025-01-15 ENCOUNTER — APPOINTMENT (OUTPATIENT)
Dept: ENDOCRINOLOGY | Facility: CLINIC | Age: 45
End: 2025-01-15

## 2025-01-15 ENCOUNTER — OUTPATIENT (OUTPATIENT)
Dept: OUTPATIENT SERVICES | Facility: HOSPITAL | Age: 45
LOS: 1 days | End: 2025-01-15
Payer: MEDICAID

## 2025-01-15 DIAGNOSIS — E11.9 TYPE 2 DIABETES MELLITUS WITHOUT COMPLICATIONS: ICD-10-CM

## 2025-01-15 DIAGNOSIS — Z00.00 ENCOUNTER FOR GENERAL ADULT MEDICAL EXAMINATION WITHOUT ABNORMAL FINDINGS: ICD-10-CM

## 2025-01-15 DIAGNOSIS — E11.9 TYPE 2 DIABETES MELLITUS W/OUT COMPLICATIONS: ICD-10-CM

## 2025-01-15 DIAGNOSIS — E11.65 TYPE 2 DIABETES MELLITUS WITH HYPERGLYCEMIA: ICD-10-CM

## 2025-01-15 DIAGNOSIS — E66.9 OBESITY, UNSPECIFIED: ICD-10-CM

## 2025-01-15 PROCEDURE — 99212 OFFICE O/P EST SF 10 MIN: CPT

## 2025-01-15 PROCEDURE — 99214 OFFICE O/P EST MOD 30 MIN: CPT

## 2025-01-21 DIAGNOSIS — E11.65 TYPE 2 DIABETES MELLITUS WITH HYPERGLYCEMIA: ICD-10-CM

## 2025-01-21 DIAGNOSIS — E66.01 MORBID (SEVERE) OBESITY DUE TO EXCESS CALORIES: ICD-10-CM

## 2025-01-21 LAB
ALBUMIN SERPL ELPH-MCNC: 4 G/DL
ALP BLD-CCNC: 91 U/L
ALT SERPL-CCNC: 31 U/L
ANION GAP SERPL CALC-SCNC: 10 MMOL/L
AST SERPL-CCNC: 33 U/L
BASOPHILS # BLD AUTO: 0.03 K/UL
BASOPHILS NFR BLD AUTO: 0.6 %
BILIRUB SERPL-MCNC: 0.6 MG/DL
BUN SERPL-MCNC: 22 MG/DL
CALCIUM SERPL-MCNC: 9.4 MG/DL
CD16+CD56+ CELLS # BLD: 187 /UL
CD16+CD56+ CELLS NFR BLD: 10 %
CD19 CELLS NFR BLD: 321 /UL
CD3 CELLS # BLD: 1325 /UL
CD3 CELLS NFR BLD: 72 %
CD3+CD4+ CELLS # BLD: 620 /UL
CD3+CD4+ CELLS NFR BLD: 34 %
CD3+CD4+ CELLS/CD3+CD8+ CLL SPEC: 0.86 RATIO
CD3+CD8+ CELLS # SPEC: 717 /UL
CD3+CD8+ CELLS NFR BLD: 39 %
CELLS.CD3-CD19+/CELLS IN BLOOD: 17 %
CHLORIDE SERPL-SCNC: 105 MMOL/L
CHOLEST SERPL-MCNC: 175 MG/DL
CO2 SERPL-SCNC: 24 MMOL/L
CREAT SERPL-MCNC: 0.8 MG/DL
EGFR: 92 ML/MIN/1.73M2
EOSINOPHIL # BLD AUTO: 0.13 K/UL
EOSINOPHIL NFR BLD AUTO: 2.6 %
ESTIMATED AVERAGE GLUCOSE: 137 MG/DL
GLUCOSE SERPL-MCNC: 131 MG/DL
HBA1C MFR BLD HPLC: 6.4 %
HCT VFR BLD CALC: 42.2 %
HDLC SERPL-MCNC: 37 MG/DL
HGB BLD-MCNC: 14.1 G/DL
HIV1 RNA # SERPL NAA+PROBE: <20 COPIES/ML
IMM GRANULOCYTES NFR BLD AUTO: 0.2 %
LDLC SERPL CALC-MCNC: 106 MG/DL
LYMPHOCYTES # BLD AUTO: 1.71 K/UL
LYMPHOCYTES NFR BLD AUTO: 34.8 %
MAN DIFF?: NORMAL
MCHC RBC-ENTMCNC: 32.4 PG
MCHC RBC-ENTMCNC: 33.4 G/DL
MCV RBC AUTO: 97 FL
MONOCYTES # BLD AUTO: 0.44 K/UL
MONOCYTES NFR BLD AUTO: 8.9 %
NEUTROPHILS # BLD AUTO: 2.6 K/UL
NEUTROPHILS NFR BLD AUTO: 52.9 %
NONHDLC SERPL-MCNC: 138 MG/DL
PLATELET # BLD AUTO: 261 K/UL
PMV BLD AUTO: 0 /100 WBCS
POTASSIUM SERPL-SCNC: 4.7 MMOL/L
PROT SERPL-MCNC: 7.1 G/DL
RBC # BLD: 4.35 M/UL
RBC # FLD: 12.9 %
SODIUM SERPL-SCNC: 139 MMOL/L
TRIGL SERPL-MCNC: 185 MG/DL
VIRAL LOAD INTERP: DETECTED
VIRAL LOAD LOG: <1.3 LOGCOPIES/ML
WBC # FLD AUTO: 4.92 K/UL

## 2025-01-27 ENCOUNTER — NON-APPOINTMENT (OUTPATIENT)
Age: 45
End: 2025-01-27

## 2025-02-20 ENCOUNTER — NON-APPOINTMENT (OUTPATIENT)
Age: 45
End: 2025-02-20

## 2025-03-20 ENCOUNTER — NON-APPOINTMENT (OUTPATIENT)
Age: 45
End: 2025-03-20

## 2025-03-25 ENCOUNTER — NON-APPOINTMENT (OUTPATIENT)
Age: 45
End: 2025-03-25

## 2025-04-17 ENCOUNTER — OUTPATIENT (OUTPATIENT)
Dept: OUTPATIENT SERVICES | Facility: HOSPITAL | Age: 45
LOS: 1 days | End: 2025-04-17
Payer: MEDICAID

## 2025-04-17 DIAGNOSIS — Z01.419 ENCOUNTER FOR GYNECOLOGICAL EXAMINATION (GENERAL) (ROUTINE) WITHOUT ABNORMAL FINDINGS: ICD-10-CM

## 2025-04-17 PROCEDURE — 83001 ASSAY OF GONADOTROPIN (FSH): CPT

## 2025-04-17 PROCEDURE — 82670 ASSAY OF TOTAL ESTRADIOL: CPT

## 2025-04-18 DIAGNOSIS — Z01.419 ENCOUNTER FOR GYNECOLOGICAL EXAMINATION (GENERAL) (ROUTINE) WITHOUT ABNORMAL FINDINGS: ICD-10-CM

## 2025-04-21 DIAGNOSIS — Z01.419 ENCOUNTER FOR GYNECOLOGICAL EXAMINATION (GENERAL) (ROUTINE) WITHOUT ABNORMAL FINDINGS: ICD-10-CM

## 2025-04-22 ENCOUNTER — OUTPATIENT (OUTPATIENT)
Dept: OUTPATIENT SERVICES | Facility: HOSPITAL | Age: 45
LOS: 1 days | End: 2025-04-22
Payer: MEDICAID

## 2025-04-22 ENCOUNTER — APPOINTMENT (OUTPATIENT)
Dept: INFECTIOUS DISEASE | Facility: CLINIC | Age: 45
End: 2025-04-22

## 2025-04-22 VITALS
HEART RATE: 63 BPM | WEIGHT: 218 LBS | RESPIRATION RATE: 16 BRPM | HEIGHT: 65 IN | BODY MASS INDEX: 36.32 KG/M2 | TEMPERATURE: 97.7 F | OXYGEN SATURATION: 97 % | SYSTOLIC BLOOD PRESSURE: 117 MMHG | DIASTOLIC BLOOD PRESSURE: 71 MMHG

## 2025-04-22 DIAGNOSIS — E11.9 TYPE 2 DIABETES MELLITUS W/OUT COMPLICATIONS: ICD-10-CM

## 2025-04-22 DIAGNOSIS — Z23 ENCOUNTER FOR IMMUNIZATION: ICD-10-CM

## 2025-04-22 DIAGNOSIS — Z21 ASYMPTOMATIC HUMAN IMMUNODEFICIENCY VIRUS [HIV] INFECTION STATUS: ICD-10-CM

## 2025-04-22 DIAGNOSIS — M17.0 BILATERAL PRIMARY OSTEOARTHRITIS OF KNEE: ICD-10-CM

## 2025-04-22 DIAGNOSIS — Z01.419 ENCOUNTER FOR GYNECOLOGICAL EXAMINATION (GENERAL) (ROUTINE) WITHOUT ABNORMAL FINDINGS: ICD-10-CM

## 2025-04-22 DIAGNOSIS — E11.9 TYPE 2 DIABETES MELLITUS WITHOUT COMPLICATIONS: ICD-10-CM

## 2025-04-22 DIAGNOSIS — E78.5 HYPERLIPIDEMIA, UNSPECIFIED: ICD-10-CM

## 2025-04-22 DIAGNOSIS — E66.9 OBESITY, UNSPECIFIED: ICD-10-CM

## 2025-04-22 PROCEDURE — 99214 OFFICE O/P EST MOD 30 MIN: CPT | Mod: 25

## 2025-04-22 PROCEDURE — 90471 IMMUNIZATION ADMIN: CPT

## 2025-04-24 LAB
ALBUMIN SERPL ELPH-MCNC: 4 G/DL
ALP BLD-CCNC: 93 U/L
ALT SERPL-CCNC: 26 U/L
ANION GAP SERPL CALC-SCNC: 7 MMOL/L
AST SERPL-CCNC: 23 U/L
BASOPHILS # BLD AUTO: 0.03 K/UL
BASOPHILS NFR BLD AUTO: 0.5 %
BILIRUB SERPL-MCNC: 0.3 MG/DL
BUN SERPL-MCNC: 24 MG/DL
CALCIUM SERPL-MCNC: 9.6 MG/DL
CD16+CD56+ CELLS # BLD: 152 /UL
CD16+CD56+ CELLS NFR BLD: 8 %
CD19 CELLS NFR BLD: 446 /UL
CD3 CELLS # BLD: 1259 /UL
CD3 CELLS NFR BLD: 68 %
CD3+CD4+ CELLS # BLD: 633 /UL
CD3+CD4+ CELLS NFR BLD: 34 %
CD3+CD4+ CELLS/CD3+CD8+ CLL SPEC: 0.98 RATIO
CD3+CD8+ CELLS # SPEC: 645 /UL
CD3+CD8+ CELLS NFR BLD: 35 %
CELLS.CD3-CD19+/CELLS IN BLOOD: 24 %
CHLORIDE SERPL-SCNC: 107 MMOL/L
CO2 SERPL-SCNC: 28 MMOL/L
CREAT SERPL-MCNC: 1.3 MG/DL
EGFRCR SERPLBLD CKD-EPI 2021: 51 ML/MIN/1.73M2
EOSINOPHIL # BLD AUTO: 0.13 K/UL
EOSINOPHIL NFR BLD AUTO: 2.4 %
ESTIMATED AVERAGE GLUCOSE: 128 MG/DL
GLUCOSE SERPL-MCNC: 106 MG/DL
HBA1C MFR BLD HPLC: 6.1 %
HCT VFR BLD CALC: 40.6 %
HGB BLD-MCNC: 13.4 G/DL
HIV1 RNA # SERPL NAA+PROBE: NOT DETECTED COPIES/ML
IMM GRANULOCYTES NFR BLD AUTO: 0.4 %
LYMPHOCYTES # BLD AUTO: 2.01 K/UL
LYMPHOCYTES NFR BLD AUTO: 36.5 %
MAN DIFF?: NORMAL
MCHC RBC-ENTMCNC: 33 G/DL
MCHC RBC-ENTMCNC: 33.3 PG
MCV RBC AUTO: 100.7 FL
MONOCYTES # BLD AUTO: 0.54 K/UL
MONOCYTES NFR BLD AUTO: 9.8 %
NEUTROPHILS # BLD AUTO: 2.78 K/UL
NEUTROPHILS NFR BLD AUTO: 50.4 %
PLATELET # BLD AUTO: 242 K/UL
PMV BLD AUTO: 0 /100 WBCS
PMV BLD: 10 FL
POTASSIUM SERPL-SCNC: 4.6 MMOL/L
PROT SERPL-MCNC: 6.9 G/DL
RBC # BLD: 4.03 M/UL
RBC # FLD: 13.2 %
SODIUM SERPL-SCNC: 142 MMOL/L
VIABILITY: NORMAL
VIRAL LOAD INTERP: NOT DETECTED
VIRAL LOAD LOG: NOT DETECTED LOGCOPIES/ML
WBC # FLD AUTO: 5.51 K/UL

## 2025-05-14 ENCOUNTER — RX RENEWAL (OUTPATIENT)
Age: 45
End: 2025-05-14

## 2025-05-14 ENCOUNTER — NON-APPOINTMENT (OUTPATIENT)
Age: 45
End: 2025-05-14

## 2025-07-10 ENCOUNTER — OUTPATIENT (OUTPATIENT)
Dept: OUTPATIENT SERVICES | Facility: HOSPITAL | Age: 45
LOS: 1 days | End: 2025-07-10
Payer: MEDICAID

## 2025-07-10 ENCOUNTER — RESULT REVIEW (OUTPATIENT)
Age: 45
End: 2025-07-10

## 2025-07-10 ENCOUNTER — NON-APPOINTMENT (OUTPATIENT)
Age: 45
End: 2025-07-10

## 2025-07-10 DIAGNOSIS — Z00.00 ENCOUNTER FOR GENERAL ADULT MEDICAL EXAMINATION WITHOUT ABNORMAL FINDINGS: ICD-10-CM

## 2025-07-10 DIAGNOSIS — Z12.31 ENCOUNTER FOR SCREENING MAMMOGRAM FOR MALIGNANT NEOPLASM OF BREAST: ICD-10-CM

## 2025-07-10 PROCEDURE — 77067 SCR MAMMO BI INCL CAD: CPT

## 2025-07-10 PROCEDURE — 77063 BREAST TOMOSYNTHESIS BI: CPT

## 2025-07-10 PROCEDURE — 77067 SCR MAMMO BI INCL CAD: CPT | Mod: 26

## 2025-07-10 PROCEDURE — 77063 BREAST TOMOSYNTHESIS BI: CPT | Mod: 26

## 2025-07-11 DIAGNOSIS — Z12.31 ENCOUNTER FOR SCREENING MAMMOGRAM FOR MALIGNANT NEOPLASM OF BREAST: ICD-10-CM

## 2025-07-29 ENCOUNTER — APPOINTMENT (OUTPATIENT)
Dept: INFECTIOUS DISEASE | Facility: CLINIC | Age: 45
End: 2025-07-29

## 2025-07-29 ENCOUNTER — OUTPATIENT (OUTPATIENT)
Dept: OUTPATIENT SERVICES | Facility: HOSPITAL | Age: 45
LOS: 1 days | End: 2025-07-29
Payer: MEDICAID

## 2025-07-29 VITALS
TEMPERATURE: 98.2 F | HEART RATE: 65 BPM | HEIGHT: 65 IN | WEIGHT: 212 LBS | BODY MASS INDEX: 35.32 KG/M2 | DIASTOLIC BLOOD PRESSURE: 72 MMHG | RESPIRATION RATE: 14 BRPM | OXYGEN SATURATION: 100 % | SYSTOLIC BLOOD PRESSURE: 111 MMHG

## 2025-07-29 DIAGNOSIS — E78.5 HYPERLIPIDEMIA, UNSPECIFIED: ICD-10-CM

## 2025-07-29 DIAGNOSIS — Z21 ASYMPTOMATIC HUMAN IMMUNODEFICIENCY VIRUS [HIV] INFECTION STATUS: ICD-10-CM

## 2025-07-29 DIAGNOSIS — E66.9 OBESITY, UNSPECIFIED: ICD-10-CM

## 2025-07-29 DIAGNOSIS — E11.9 TYPE 2 DIABETES MELLITUS W/OUT COMPLICATIONS: ICD-10-CM

## 2025-07-29 DIAGNOSIS — E11.9 TYPE 2 DIABETES MELLITUS WITHOUT COMPLICATIONS: ICD-10-CM

## 2025-07-29 PROCEDURE — 99214 OFFICE O/P EST MOD 30 MIN: CPT

## 2025-07-30 LAB
ALBUMIN SERPL ELPH-MCNC: 4.5 G/DL
ALP BLD-CCNC: 104 U/L
ALT SERPL-CCNC: 22 U/L
ANION GAP SERPL CALC-SCNC: 11 MMOL/L
AST SERPL-CCNC: 25 U/L
BASOPHILS # BLD AUTO: 0.04 K/UL
BASOPHILS NFR BLD AUTO: 0.7 %
BILIRUB SERPL-MCNC: 0.5 MG/DL
BUN SERPL-MCNC: 15 MG/DL
CALCIUM SERPL-MCNC: 9.4 MG/DL
CHLORIDE SERPL-SCNC: 109 MMOL/L
CHOLEST SERPL-MCNC: 173 MG/DL
CO2 SERPL-SCNC: 21 MMOL/L
CREAT SERPL-MCNC: 1 MG/DL
EGFRCR SERPLBLD CKD-EPI 2021: 70 ML/MIN/1.73M2
EOSINOPHIL # BLD AUTO: 0.07 K/UL
EOSINOPHIL NFR BLD AUTO: 1.2 %
ESTIMATED AVERAGE GLUCOSE: 126 MG/DL
GLUCOSE SERPL-MCNC: 90 MG/DL
HBA1C MFR BLD HPLC: 6 %
HCT VFR BLD CALC: 42.2 %
HDLC SERPL-MCNC: 43 MG/DL
HGB BLD-MCNC: 13.9 G/DL
IMM GRANULOCYTES NFR BLD AUTO: 0.2 %
LDLC SERPL-MCNC: 106 MG/DL
LYMPHOCYTES # BLD AUTO: 1.65 K/UL
LYMPHOCYTES NFR BLD AUTO: 27.4 %
MAN DIFF?: NORMAL
MCHC RBC-ENTMCNC: 32.3 PG
MCHC RBC-ENTMCNC: 32.9 G/DL
MCV RBC AUTO: 98.1 FL
MONOCYTES # BLD AUTO: 0.61 K/UL
MONOCYTES NFR BLD AUTO: 10.1 %
NEUTROPHILS # BLD AUTO: 3.64 K/UL
NEUTROPHILS NFR BLD AUTO: 60.4 %
NONHDLC SERPL-MCNC: 130 MG/DL
PLATELET # BLD AUTO: 272 K/UL
PMV BLD AUTO: 0 /100 WBCS
PMV BLD: 9.9 FL
POTASSIUM SERPL-SCNC: 5 MMOL/L
PROT SERPL-MCNC: 7.3 G/DL
RBC # BLD: 4.3 M/UL
RBC # FLD: 13.4 %
SODIUM SERPL-SCNC: 141 MMOL/L
TRIGL SERPL-MCNC: 132 MG/DL
WBC # FLD AUTO: 6.02 K/UL

## 2025-07-31 LAB
CD16+CD56+ CELLS # BLD: 230 CELLS/UL
CD16+CD56+ CELLS NFR BLD: 13 %
CD19 CELLS NFR BLD: 243 CELLS/UL
CD3 CELLS # BLD: 1264 CELLS/UL
CD3 CELLS NFR BLD: 72 %
CD3+CD4+ CELLS # BLD: 601 CELLS/UL
CD3+CD4+ CELLS NFR BLD: 34 %
CD3+CD4+ CELLS/CD3+CD8+ CLL SPEC: 0.89 RATIO
CD3+CD8+ CELLS # SPEC: 678 CELLS/UL
CD3+CD8+ CELLS NFR BLD: 39 %
CELLS.CD3-CD19+/CELLS IN BLOOD: 14 %
CREAT SPEC-SCNC: 151 MG/DL
CREAT/PROT UR: 0.1 RATIO
HIV1 RNA # SERPL NAA+PROBE: NOT DETECTED COPIES/ML
PROT UR-MCNC: 11 MG/DL
VIABILITY: NORMAL
VIRAL LOAD INTERP: NOT DETECTED
VIRAL LOAD LOG: NOT DETECTED LOGCOPIES/ML

## 2025-09-09 ENCOUNTER — RX RENEWAL (OUTPATIENT)
Age: 45
End: 2025-09-09

## 2025-09-10 ENCOUNTER — RX RENEWAL (OUTPATIENT)
Age: 45
End: 2025-09-10

## 2025-09-15 ENCOUNTER — NON-APPOINTMENT (OUTPATIENT)
Age: 45
End: 2025-09-15

## 2025-09-23 PROBLEM — M17.0 ARTHRITIS OF BOTH KNEES: Status: ACTIVE | Noted: 2025-09-23
